# Patient Record
Sex: FEMALE | URBAN - METROPOLITAN AREA
[De-identification: names, ages, dates, MRNs, and addresses within clinical notes are randomized per-mention and may not be internally consistent; named-entity substitution may affect disease eponyms.]

---

## 2017-11-30 ENCOUNTER — APPOINTMENT (RX ONLY)
Dept: URBAN - METROPOLITAN AREA CLINIC 152 | Facility: CLINIC | Age: 72
Setting detail: DERMATOLOGY
End: 2017-11-30

## 2017-11-30 DIAGNOSIS — L30.4 ERYTHEMA INTERTRIGO: ICD-10-CM

## 2017-11-30 PROBLEM — J45.909 UNSPECIFIED ASTHMA, UNCOMPLICATED: Status: ACTIVE | Noted: 2017-11-30

## 2017-11-30 PROBLEM — F41.9 ANXIETY DISORDER, UNSPECIFIED: Status: ACTIVE | Noted: 2017-11-30

## 2017-11-30 PROBLEM — F32.9 MAJOR DEPRESSIVE DISORDER, SINGLE EPISODE, UNSPECIFIED: Status: ACTIVE | Noted: 2017-11-30

## 2017-11-30 PROBLEM — E03.9 HYPOTHYROIDISM, UNSPECIFIED: Status: ACTIVE | Noted: 2017-11-30

## 2017-11-30 PROCEDURE — ? COUNSELING

## 2017-11-30 PROCEDURE — ? PRESCRIPTION

## 2017-11-30 PROCEDURE — 99202 OFFICE O/P NEW SF 15 MIN: CPT

## 2017-11-30 RX ORDER — HYDROCORTISONE 25 MG/G
CREAM TOPICAL
Qty: 1 | Refills: 6 | Status: ERX | COMMUNITY
Start: 2017-11-30

## 2017-11-30 RX ORDER — MICONAZOLE NITRATE 20 MG/G
CREAM TOPICAL
Qty: 1 | Refills: 6 | Status: ERX | COMMUNITY
Start: 2017-11-30

## 2017-11-30 RX ADMIN — MICONAZOLE NITRATE: 20 CREAM TOPICAL at 17:01

## 2017-11-30 RX ADMIN — HYDROCORTISONE: 25 CREAM TOPICAL at 17:01

## 2017-11-30 ASSESSMENT — LOCATION DETAILED DESCRIPTION DERM
LOCATION DETAILED: LEFT MEDIAL BREAST 8-9:00 REGION
LOCATION DETAILED: LEFT ANTERIOR PROXIMAL THIGH
LOCATION DETAILED: RIGHT LATERAL ABDOMEN
LOCATION DETAILED: RIGHT ANTERIOR PROXIMAL THIGH
LOCATION DETAILED: LEFT LATERAL ABDOMEN
LOCATION DETAILED: PERIUMBILICAL SKIN

## 2017-11-30 ASSESSMENT — LOCATION SIMPLE DESCRIPTION DERM
LOCATION SIMPLE: RIGHT THIGH
LOCATION SIMPLE: ABDOMEN
LOCATION SIMPLE: LEFT BREAST
LOCATION SIMPLE: LEFT THIGH

## 2017-11-30 ASSESSMENT — LOCATION ZONE DERM
LOCATION ZONE: TRUNK
LOCATION ZONE: LEG

## 2017-11-30 ASSESSMENT — SEVERITY ASSESSMENT: SEVERITY: MODERATE TO SEVERE

## 2017-11-30 ASSESSMENT — PAIN INTENSITY VAS: HOW INTENSE IS YOUR PAIN 0 BEING NO PAIN, 10 BEING THE MOST SEVERE PAIN POSSIBLE?: 5/10 PAIN

## 2018-08-28 ENCOUNTER — OFFICE VISIT (OUTPATIENT)
Dept: INTERNAL MEDICINE CLINIC | Age: 73
End: 2018-08-28

## 2018-08-28 VITALS
HEIGHT: 67 IN | BODY MASS INDEX: 31.86 KG/M2 | OXYGEN SATURATION: 95 % | WEIGHT: 203 LBS | SYSTOLIC BLOOD PRESSURE: 132 MMHG | DIASTOLIC BLOOD PRESSURE: 86 MMHG | TEMPERATURE: 98.3 F | HEART RATE: 55 BPM | RESPIRATION RATE: 18 BRPM

## 2018-08-28 DIAGNOSIS — E03.9 ACQUIRED HYPOTHYROIDISM: Primary | ICD-10-CM

## 2018-08-28 DIAGNOSIS — F03.90 DEMENTIA WITHOUT BEHAVIORAL DISTURBANCE, UNSPECIFIED DEMENTIA TYPE: ICD-10-CM

## 2018-08-28 DIAGNOSIS — I10 ESSENTIAL HYPERTENSION: ICD-10-CM

## 2018-08-28 DIAGNOSIS — R73.9 ELEVATED BLOOD SUGAR: ICD-10-CM

## 2018-08-28 DIAGNOSIS — J41.0 SIMPLE CHRONIC BRONCHITIS (HCC): ICD-10-CM

## 2018-08-28 DIAGNOSIS — F32.A MILD DEPRESSION: ICD-10-CM

## 2018-08-28 LAB — HBA1C MFR BLD HPLC: 5.2 %

## 2018-08-28 RX ORDER — PHENOL/SODIUM PHENOLATE
20 AEROSOL, SPRAY (ML) MUCOUS MEMBRANE DAILY
COMMUNITY
End: 2018-11-28 | Stop reason: SDUPTHER

## 2018-08-28 RX ORDER — FUROSEMIDE 20 MG/1
TABLET ORAL DAILY
COMMUNITY
End: 2018-11-28 | Stop reason: SDUPTHER

## 2018-08-28 RX ORDER — CYANOCOBALAMIN 1000 UG/ML
1000 INJECTION, SOLUTION INTRAMUSCULAR; SUBCUTANEOUS ONCE
COMMUNITY
End: 2018-11-28 | Stop reason: SDUPTHER

## 2018-08-28 RX ORDER — SUCRALFATE 1 G/1
1 TABLET ORAL 4 TIMES DAILY
COMMUNITY
End: 2018-11-28 | Stop reason: SDUPTHER

## 2018-08-28 RX ORDER — THIOTHIXENE 2 MG/1
4 CAPSULE ORAL
COMMUNITY
End: 2019-04-18

## 2018-08-28 RX ORDER — SERTRALINE HYDROCHLORIDE 100 MG/1
TABLET, FILM COATED ORAL DAILY
COMMUNITY
End: 2019-04-18

## 2018-08-28 RX ORDER — QUETIAPINE FUMARATE 200 MG/1
100 TABLET, FILM COATED ORAL
COMMUNITY
End: 2019-04-18

## 2018-08-28 RX ORDER — DOXEPIN HYDROCHLORIDE 25 MG/1
CAPSULE ORAL
COMMUNITY
End: 2021-09-16 | Stop reason: ALTCHOICE

## 2018-08-28 RX ORDER — LAMOTRIGINE 200 MG/1
200 TABLET ORAL 2 TIMES DAILY
COMMUNITY

## 2018-08-28 RX ORDER — DONEPEZIL HYDROCHLORIDE 10 MG/1
10 TABLET, FILM COATED ORAL
COMMUNITY
End: 2020-06-23

## 2018-08-28 RX ORDER — PROPRANOLOL HYDROCHLORIDE 60 MG/1
10 TABLET ORAL 2 TIMES DAILY
COMMUNITY
End: 2020-01-23 | Stop reason: DRUGHIGH

## 2018-08-28 RX ORDER — LEVOTHYROXINE SODIUM 50 UG/1
TABLET ORAL
COMMUNITY
End: 2018-11-28 | Stop reason: SDUPTHER

## 2018-08-28 RX ORDER — HYDROXYZINE PAMOATE 25 MG/1
25 CAPSULE ORAL
COMMUNITY
End: 2022-07-11

## 2018-08-28 RX ORDER — POTASSIUM CHLORIDE 750 MG/1
10 CAPSULE, EXTENDED RELEASE ORAL DAILY
COMMUNITY
End: 2018-11-28 | Stop reason: SDUPTHER

## 2018-08-28 NOTE — PROGRESS NOTES
Chief Complaint   Patient presents with    Thyroid Problem     Hypothyroidism    Dementia     Taking B12 injections monthly. Neurologist in [facility] [address] recommend she continue injections forever. 1. Have you been to the ER, urgent care clinic since your last visit? Hospitalized since your last visit? No    2. Have you seen or consulted any other health care providers outside of the [address] [place] [address] since your last visit? Include any pap smears or colon screening. Will be seeing Psych on September 27th.     PHQ over the last two weeks 8/28/2018   PHQ Not Done Active Diagnosis of Depression or Bipolar Disorder

## 2018-09-07 NOTE — PROGRESS NOTES
HISTORY OF PRESENT ILLNESS  Amanda San is a 72 y.o. female. HPI   Pt is here to establish care. She recently moved here from [place] and was dx there with dementia. She has been doing well on lamictal, aricept, and latuda. She would like to continue these meds but does not currently need refills. She has copies of recent labs from April and her only abnormality was a slightly elevated BS. She is utd on her mammogram. She has no complaints today. Past Medical History:   Diagnosis Date    Asthma     Chronic obstructive pulmonary disease ([address]     Psychotic disorder     Thyroid disease        Past Surgical History:   Procedure Laterality Date    HX GI      Gastric Bypass X3, hemorrhoidectomy    HX GYN      Hysterectomy, , tubal ligation     HX HEENT      cataract 2015    HX ORTHOPAEDIC      Laminectomy  and , meniscus Left , TKR right , Left 2016       Social History     Social History    Marital status:      Spouse name: N/A    Number of children: N/A    Years of education: N/A     Occupational History    Not on file. Social History Main Topics    Smoking status: Never Smoker    Smokeless tobacco: Never Used    Alcohol use No    Drug use: No    Sexual activity: No     Other Topics Concern    Not on file     Social History Narrative    No narrative on file       No family history on file. Allergies   Allergen Reactions    Erythromycin Rash       Current Outpatient Prescriptions   Medication Sig    QUEtiapine (SEROQUEL) 200 mg tablet Take 200 mg by mouth two (2) times a day.  sertraline (ZOLOFT) 100 mg tablet Take  by mouth daily.  thiothixene (NAVANE) 2 mg capsule Take 4 mg by mouth nightly.  Omeprazole delayed release (PRILOSEC D/R) 20 mg tablet Take 20 mg by mouth daily.  furosemide (LASIX) 20 mg tablet Take  by mouth daily.  lamoTRIgine (LAMICTAL) 200 mg tablet Take 200 mg by mouth two (2) times a day.     potassium chloride SA (MICRO-K) 10 mEq capsule Take 10 mEq by mouth daily.  levothyroxine (SYNTHROID) 50 mcg tablet Take  by mouth Daily (before breakfast).  sucralfate (CARAFATE) 1 gram tablet Take 1 g by mouth four (4) times daily.  donepezil (ARICEPT) 10 mg tablet Take 10 mg by mouth nightly.  cyanocobalamin (VITAMIN B12) 1,000 mcg/mL injection 1,000 mcg by IntraMUSCular route once.  propranolol (INDERAL) 60 mg tablet Take 60 mg by mouth two (2) times a day.  lurasidone (LATUDA) 60 mg tab tablet Take  by mouth.  doxepin (SINEQUAN) 25 mg capsule Take  by mouth nightly.  hydrOXYzine pamoate (VISTARIL) 25 mg capsule Take 25 mg by mouth nightly. No current facility-administered medications for this visit. Review of Systems   Constitutional: Negative. Negative for chills, fever and malaise/fatigue. HENT: Negative. Negative for congestion, ear pain, sore throat and tinnitus. Eyes: Negative. Negative for blurred vision, double vision and photophobia. Respiratory: Positive for cough (chronic). Negative for shortness of breath and wheezing. Cardiovascular: Negative. Negative for chest pain, palpitations and leg swelling. Gastrointestinal: Negative. Negative for abdominal pain, heartburn, nausea and vomiting. Genitourinary: Negative. Negative for dysuria, frequency, hematuria and urgency. Musculoskeletal: Negative for back pain, joint pain, myalgias and neck pain. Ambulates w walker   Skin: Negative. Negative for itching and rash. Neurological: Negative. Negative for dizziness, tingling, tremors and headaches. Psychiatric/Behavioral: Positive for depression (controlled on meds) and memory loss (pt has dementia but is currently affecting only short term and not regularly). The patient is not nervous/anxious and does not have insomnia.       Visit Vitals    /86 (BP 1 Location: Left arm, BP Patient Position: Sitting)    Pulse (!) 55    Temp 98.3 °F (36.8 °C) (Oral)    Resp 18    Ht 5' 7\" (1.702 m)    Wt 203 lb (92.1 kg)    SpO2 95%    BMI 31.79 kg/m2       Physical Exam   Constitutional: She is oriented to person, place, and time. She appears well-developed and well-nourished. No distress. Pt is overweight   HENT:   Head: Normocephalic and atraumatic. Cardiovascular: Normal rate, regular rhythm and normal heart sounds. Pulmonary/Chest: Effort normal and breath sounds normal.   Neurological: She is alert and oriented to person, place, and time. Skin: Skin is warm and dry. She is not diaphoretic. Psychiatric: She has a normal mood and affect. Her behavior is normal.       ASSESSMENT and PLAN    ICD-10-CM ICD-9-CM    1. Acquired hypothyroidism E03.9 244.9    2. Dementia without behavioral disturbance, unspecified dementia type F03.90 294.20    3. Mild depression ([facility] [address] F32.0 311    4. Simple chronic bronchitis (HCC) J41.0 491.0    5. Essential hypertension I10 401.9    6. Elevated blood sugar R73.9 790.29 AMB POC HEMOGLOBIN A1C     Follow-up Disposition:  Return in about 3 months (around 11/28/2018) for follow up. Pt expressed understanding of visit summary and plans for any follow ups or referrals as well as any medications prescribed.

## 2018-09-07 NOTE — PATIENT INSTRUCTIONS
Helping A Person With Dementia: Care Instructions  Your Care Instructions    Dementia is a loss of mental skills that affects daily life. It is different from mild memory loss that occurs with aging. Dementia can cause problems with memory, thinking clearly, and planning. It is different for everyone. But it usually gets worse slowly. Some people who have dementia can function well for a long time. But at some point it may become hard for the person to care for himself or herself. It can be upsetting to learn that a loved one has this condition. You may be afraid and worried about what will happen. You may wonder how you will care for the person. There is no cure for dementia. But medicine may be able to slow memory loss and improve thinking for a while. Other medicines may help with sleep, depression, and behavior changes. Dementia is different for everyone. In some cases, people can function well for a long time. You can help your loved one by making his or her home life easier and safer. You also need to take care of yourself. Caregiving can be stressful. But support is available to help you and give you a break when you need it. The Alzheimer's Association offers good information and support. If you are caring for someone with dementia, you can help make life safer and more comfortable. You can also help your loved one make decisions about future care. You may also want to bring up legal and financial issues. These are hard but important conversations to have. Follow-up care is a key part of your loved one's treatment and safety. Be sure to make and go to all appointments, and call your doctor if your loved one is having problems. It's also a good idea to know your loved one's test results and keep a list of the medicines he or she takes. How can you care for your loved one at home? Taking care of the person  · If the person takes medicine for dementia, help him or her take it exactly as prescribed. Call the doctor if you notice any problems with the medicine. · Make a list of the person's medicines. Review it with all of his or her doctors. · Help the person eat a balanced diet. Serve plenty of whole grains, fruits, and vegetables every day. If the person is not hungry at mealtimes, give snacks at midmorning and in the afternoon. Offer drinks such as Boost, Ensure, or Sustacal if the person is losing weight. · Encourage exercise. Walking and other activities may slow the decline of mental ability. Help the person stay active mentally with reading, crossword puzzles, or other hobbies. · Take steps to help if the person is sundowning. This is the restless behavior and trouble with sleeping that may occur in late afternoon and at night. Try not to let the person nap during the day. Offer a glass of warm milk or caffeine-free tea before bedtime. · Develop a routine. Your loved one will feel less frustrated or confused with a clear, simple plan of what to do every day. · Be patient. A task may take the person longer than it used to. · For as long as he or she is able, allow your loved one to make decisions about activities, food, clothing, and other choices. Let him or her be independent, even if tasks take more time or are not done perfectly. Tailor tasks to the person's abilities. For example, if cooking is no longer safe, ask for other help. Your loved one can help set the table, or make simple dishes such as a salad. When the person needs help, offer it gently. Staying safe  · Make your home (or your loved one's home) safe. Tack down rugs, and put no-slip tape in the tub. Install handrails, and put safety switches on stoves and appliances. Keep rooms free of clutter. Make sure walkways around furniture are clear. Do not move furniture around, because the person may become confused. · Use locks on doors and cupboards.  Lock up knives, scissors, medicines, cleaning supplies, and other dangerous things. · Do not let the person drive or cook if he or she can't do it safely. A person with dementia should not drive unless he or she is able to pass an on-road driving test. Your state 's license bureau can do a driving test if there is any question. · Get medical alert jewelry for the person so that you can be contacted if he or she wanders away. If possible, provide a safe place for wandering, such as an enclosed yard or garden. Taking care of yourself  · Ask your doctor about support groups and other resources in your area. · Take care of your health. Be sure to eat healthy foods and get enough rest and exercise. · Take time for yourself. Respite services provide someone to stay with the person for a short time while you get out of the house for a few hours. · Make time for an activity that you enjoy. Read, listen to music, paint, do crafts, or play an instrument, even if it's only for a few minutes a day. · Spend time with family, friends, and others in your support system. When should you call for help? Call 911 anytime you think the person may need emergency care. For example, call if:    · The person who has dementia wanders away and you can't find him or her.     · The person who has dementia is seriously injured.    Call the doctor now or seek immediate medical care if:    · The person suddenly sees things that are not there (hallucinates).     · The person has a sudden change in his or her behavior.    Watch closely for changes in the person's health, and be sure to contact the doctor if:    · The person has symptoms that could cause injury.     · The person has problems with his or her medicine.     · You need more information to care for a person with dementia.     · You need respite care so you can take a break. Where can you learn more? Go to http://juan alberto-marina.info/.   Enter D712 in the search box to learn more about \"Helping A Person With Dementia: Care Instructions. \"  Current as of: December 7, 2017  Content Version: 11.7  © 8134-1913 [employer], [facility]. Care instructions adapted under license by [employer] (which disclaims liability or warranty for this information). If you have questions about a medical condition or this instruction, always ask your healthcare professional. [place] [address] any warranty or liability for your use of this information.

## 2018-11-28 ENCOUNTER — OFFICE VISIT (OUTPATIENT)
Dept: INTERNAL MEDICINE CLINIC | Age: 73
End: 2018-11-28

## 2018-11-28 VITALS
DIASTOLIC BLOOD PRESSURE: 82 MMHG | OXYGEN SATURATION: 98 % | TEMPERATURE: 98.3 F | RESPIRATION RATE: 18 BRPM | HEART RATE: 63 BPM | HEIGHT: 67 IN | BODY MASS INDEX: 31.79 KG/M2 | SYSTOLIC BLOOD PRESSURE: 155 MMHG

## 2018-11-28 DIAGNOSIS — R60.1 GENERALIZED EDEMA: ICD-10-CM

## 2018-11-28 DIAGNOSIS — K21.9 GASTROESOPHAGEAL REFLUX DISEASE WITHOUT ESOPHAGITIS: Primary | ICD-10-CM

## 2018-11-28 DIAGNOSIS — I10 ESSENTIAL HYPERTENSION: ICD-10-CM

## 2018-11-28 DIAGNOSIS — E03.9 ACQUIRED HYPOTHYROIDISM: ICD-10-CM

## 2018-11-28 DIAGNOSIS — S83.279S CMPLX TEAR OF LAT MENSC, CURRENT INJURY, UNSP KNEE, SEQUELA: ICD-10-CM

## 2018-11-28 DIAGNOSIS — K58.9 IRRITABLE BOWEL SYNDROME, UNSPECIFIED TYPE: ICD-10-CM

## 2018-11-28 DIAGNOSIS — E53.8 B12 DEFICIENCY: ICD-10-CM

## 2018-11-28 RX ORDER — POTASSIUM CHLORIDE 750 MG/1
10 CAPSULE, EXTENDED RELEASE ORAL DAILY
Qty: 90 CAP | Refills: 1 | Status: SHIPPED | OUTPATIENT
Start: 2018-11-28 | End: 2019-04-28

## 2018-11-28 RX ORDER — CYANOCOBALAMIN 1000 UG/ML
1000 INJECTION, SOLUTION INTRAMUSCULAR; SUBCUTANEOUS ONCE
Qty: 1 VIAL | Refills: 0
Start: 2018-11-28 | End: 2018-11-28

## 2018-11-28 RX ORDER — PHENOL/SODIUM PHENOLATE
20 AEROSOL, SPRAY (ML) MUCOUS MEMBRANE DAILY
Qty: 90 TAB | Refills: 1 | Status: SHIPPED | OUTPATIENT
Start: 2018-11-28 | End: 2019-07-17 | Stop reason: SDUPTHER

## 2018-11-28 RX ORDER — FUROSEMIDE 20 MG/1
20 TABLET ORAL DAILY
Qty: 90 TAB | Refills: 1 | Status: SHIPPED | OUTPATIENT
Start: 2018-11-28 | End: 2019-07-17 | Stop reason: SDUPTHER

## 2018-11-28 RX ORDER — SUCRALFATE 1 G/1
1 TABLET ORAL 4 TIMES DAILY
Qty: 360 TAB | Refills: 1 | Status: SHIPPED | OUTPATIENT
Start: 2018-11-28 | End: 2019-03-06 | Stop reason: SDUPTHER

## 2018-11-28 RX ORDER — LEVOTHYROXINE SODIUM 50 UG/1
50 TABLET ORAL
Qty: 90 TAB | Refills: 1 | Status: SHIPPED | OUTPATIENT
Start: 2018-11-28 | End: 2019-05-23 | Stop reason: SDUPTHER

## 2018-11-29 LAB
ALBUMIN SERPL-MCNC: 4 G/DL (ref 3.5–4.8)
ALBUMIN/GLOB SERPL: 1.4 {RATIO} (ref 1.2–2.2)
ALP SERPL-CCNC: 136 IU/L (ref 39–117)
ALT SERPL-CCNC: 10 IU/L (ref 0–32)
AST SERPL-CCNC: 15 IU/L (ref 0–40)
BILIRUB SERPL-MCNC: 0.3 MG/DL (ref 0–1.2)
BUN SERPL-MCNC: 17 MG/DL (ref 8–27)
BUN/CREAT SERPL: 17 (ref 12–28)
CALCIUM SERPL-MCNC: 9.3 MG/DL (ref 8.7–10.3)
CHLORIDE SERPL-SCNC: 103 MMOL/L (ref 96–106)
CHOLEST SERPL-MCNC: 275 MG/DL (ref 100–199)
CO2 SERPL-SCNC: 24 MMOL/L (ref 20–29)
CREAT SERPL-MCNC: 1 MG/DL (ref 0.57–1)
GLOBULIN SER CALC-MCNC: 2.8 G/DL (ref 1.5–4.5)
GLUCOSE SERPL-MCNC: 86 MG/DL (ref 65–99)
HDLC SERPL-MCNC: 74 MG/DL
LDLC SERPL CALC-MCNC: 177 MG/DL (ref 0–99)
POTASSIUM SERPL-SCNC: 4.4 MMOL/L (ref 3.5–5.2)
PROT SERPL-MCNC: 6.8 G/DL (ref 6–8.5)
SODIUM SERPL-SCNC: 141 MMOL/L (ref 134–144)
TRIGL SERPL-MCNC: 121 MG/DL (ref 0–149)
VLDLC SERPL CALC-MCNC: 24 MG/DL (ref 5–40)

## 2018-12-11 ENCOUNTER — TELEPHONE (OUTPATIENT)
Dept: INTERNAL MEDICINE CLINIC | Age: 73
End: 2018-12-11

## 2018-12-11 DIAGNOSIS — E78.00 HYPERCHOLESTEROLEMIA: Primary | ICD-10-CM

## 2018-12-11 NOTE — TELEPHONE ENCOUNTER
Please advise pt her LDL cholesterol was very high, 177. Can you verify if she was fasting? If not I would like to repeat fasting labs and if she was then I will send in meds and we can recheck in 3 months.

## 2018-12-11 NOTE — PROGRESS NOTES
HISTORY OF PRESENT ILLNESS  Yamil Herrera is a 72 y.o. female. HPI   Pt is here for med refills. She is out of all her regular meds and denies any change in her conditions. She is fasting for labs and has no additional complaints today. Allergies   Allergen Reactions    Erythromycin Rash       Current Outpatient Medications   Medication Sig    Omeprazole delayed release (PRILOSEC D/R) 20 mg tablet Take 1 Tab by mouth daily.  furosemide (LASIX) 20 mg tablet Take 1 Tab by mouth daily.  potassium chloride SA (MICRO-K) 10 mEq capsule Take 1 Cap by mouth daily.  levothyroxine (SYNTHROID) 50 mcg tablet Take 1 Tab by mouth Daily (before breakfast).  sucralfate (CARAFATE) 1 gram tablet Take 1 Tab by mouth four (4) times daily.  QUEtiapine (SEROQUEL) 200 mg tablet Take 200 mg by mouth two (2) times a day.  sertraline (ZOLOFT) 100 mg tablet Take  by mouth daily.  thiothixene (NAVANE) 2 mg capsule Take 4 mg by mouth nightly.  lamoTRIgine (LAMICTAL) 200 mg tablet Take 200 mg by mouth two (2) times a day.  donepezil (ARICEPT) 10 mg tablet Take 10 mg by mouth nightly.  propranolol (INDERAL) 60 mg tablet Take 60 mg by mouth two (2) times a day.  lurasidone (LATUDA) 60 mg tab tablet Take  by mouth.  doxepin (SINEQUAN) 25 mg capsule Take  by mouth nightly.  hydrOXYzine pamoate (VISTARIL) 25 mg capsule Take 25 mg by mouth nightly. No current facility-administered medications for this visit. Review of Systems   Constitutional: Negative. Negative for chills, fever and malaise/fatigue. HENT: Negative. Negative for congestion, ear pain, sore throat and tinnitus. Eyes: Negative. Negative for blurred vision, double vision and photophobia. Respiratory: Positive for cough (chronic). Negative for shortness of breath and wheezing. Cardiovascular: Negative. Negative for chest pain, palpitations and leg swelling. Gastrointestinal: Negative.   Negative for abdominal pain, heartburn, nausea and vomiting. Genitourinary: Negative. Negative for dysuria, frequency, hematuria and urgency. Musculoskeletal: Negative for back pain, joint pain, myalgias and neck pain. Ambulates w walker   Skin: Negative. Negative for itching and rash. Neurological: Negative. Negative for dizziness, tingling, tremors and headaches. Psychiatric/Behavioral: Positive for depression (controlled on meds) and memory loss (pt has dementia but is currently affecting only short term and not regularly). The patient is not nervous/anxious and does not have insomnia. Visit Vitals  /82   Pulse 63   Temp 98.3 °F (36.8 °C) (Oral)   Resp 18   Ht 5' 7\" (1.702 m)   SpO2 98%   BMI 31.79 kg/m²       Physical Exam   Constitutional: She is oriented to person, place, and time. She appears well-developed and well-nourished. No distress. Pt is overweight   HENT:   Head: Normocephalic and atraumatic. Cardiovascular: Normal rate, regular rhythm and normal heart sounds. Pulmonary/Chest: Effort normal and breath sounds normal.   Neurological: She is alert and oriented to person, place, and time. Skin: Skin is warm and dry. She is not diaphoretic. Psychiatric: She has a normal mood and affect. Her behavior is normal.       ASSESSMENT and PLAN    ICD-10-CM ICD-9-CM    1. Gastroesophageal reflux disease without esophagitis K21.9 530.81 Omeprazole delayed release (PRILOSEC D/R) 20 mg tablet   2. Acquired hypothyroidism E03.9 244.9 levothyroxine (SYNTHROID) 50 mcg tablet   3. Irritable bowel syndrome, unspecified type K58.9 564.1 sucralfate (CARAFATE) 1 gram tablet   4. B12 deficiency E53.8 266.2 cyanocobalamin (VITAMIN B12) 1,000 mcg/mL injection   5. Essential hypertension I10 401.9 LIPID PANEL      METABOLIC PANEL, COMPREHENSIVE      COLLECTION VENOUS BLOOD,VENIPUNCTURE   6. Generalized edema R60.1 782.3 furosemide (LASIX) 20 mg tablet      potassium chloride SA (MICRO-K) 10 mEq capsule   7.  Cmplx tear of lat mensc, current injury, unsp knee, sequela S83.279S 905.7      Follow-up Disposition:  Return if symptoms worsen or fail to improve. Pt expressed understanding of visit summary and plans for any follow ups or referrals as well as any medications prescribed.

## 2018-12-11 NOTE — PATIENT INSTRUCTIONS

## 2018-12-13 NOTE — TELEPHONE ENCOUNTER
She was fasting the day of her labwork. She is ok to start statin, please send to [facility] [address].

## 2018-12-17 RX ORDER — ROSUVASTATIN CALCIUM 20 MG/1
20 TABLET, COATED ORAL
Qty: 90 TAB | Refills: 0 | Status: SHIPPED | OUTPATIENT
Start: 2018-12-17 | End: 2019-04-18 | Stop reason: DRUGHIGH

## 2019-03-04 ENCOUNTER — TELEPHONE (OUTPATIENT)
Dept: INTERNAL MEDICINE CLINIC | Age: 74
End: 2019-03-04

## 2019-03-04 DIAGNOSIS — K58.9 IRRITABLE BOWEL SYNDROME, UNSPECIFIED TYPE: ICD-10-CM

## 2019-03-04 DIAGNOSIS — R79.89 OTHER SPECIFIED ABNORMAL FINDINGS OF BLOOD CHEMISTRY: ICD-10-CM

## 2019-03-04 NOTE — TELEPHONE ENCOUNTER
Pt called in requesting to have a lab order to [facility] mailed to her house. She is requesting a lipid panel and CMP.      Patient called requesting refill on Carafate 1mg   Patient last appointment was 11/28/18  Patient next appointment is 05/28/19  Pharmacy patient wants refill to go to  [facility]

## 2019-03-06 RX ORDER — SUCRALFATE 1 G/1
1 TABLET ORAL 4 TIMES DAILY
Qty: 360 TAB | Refills: 1 | Status: SHIPPED | OUTPATIENT
Start: 2019-03-06 | End: 2019-07-17 | Stop reason: SDUPTHER

## 2019-04-18 ENCOUNTER — OFFICE VISIT (OUTPATIENT)
Dept: INTERNAL MEDICINE CLINIC | Age: 74
End: 2019-04-18

## 2019-04-18 VITALS
OXYGEN SATURATION: 95 % | HEIGHT: 67 IN | SYSTOLIC BLOOD PRESSURE: 144 MMHG | DIASTOLIC BLOOD PRESSURE: 83 MMHG | TEMPERATURE: 98.3 F | HEART RATE: 80 BPM | WEIGHT: 203 LBS | RESPIRATION RATE: 18 BRPM | BODY MASS INDEX: 31.86 KG/M2

## 2019-04-18 DIAGNOSIS — B37.9 CANDIDA GLABRATA INFECTION: ICD-10-CM

## 2019-04-18 DIAGNOSIS — K58.9 IRRITABLE BOWEL SYNDROME, UNSPECIFIED TYPE: ICD-10-CM

## 2019-04-18 DIAGNOSIS — I10 ESSENTIAL HYPERTENSION: ICD-10-CM

## 2019-04-18 DIAGNOSIS — N39.0 URINARY TRACT INFECTION WITHOUT HEMATURIA, SITE UNSPECIFIED: Primary | ICD-10-CM

## 2019-04-18 DIAGNOSIS — E78.00 HYPERCHOLESTEROLEMIA: ICD-10-CM

## 2019-04-18 LAB
BILIRUB UR QL STRIP: NEGATIVE
GLUCOSE UR-MCNC: NEGATIVE MG/DL
KETONES P FAST UR STRIP-MCNC: NEGATIVE MG/DL
PH UR STRIP: 7 [PH] (ref 4.6–8)
PROT UR QL STRIP: NEGATIVE
SP GR UR STRIP: 1.01 (ref 1–1.03)
UA UROBILINOGEN AMB POC: NORMAL (ref 0.2–1)
URINALYSIS CLARITY POC: CLEAR
URINALYSIS COLOR POC: YELLOW
URINE BLOOD POC: NORMAL
URINE LEUKOCYTES POC: NEGATIVE
URINE NITRITES POC: NEGATIVE

## 2019-04-18 RX ORDER — ROSUVASTATIN CALCIUM 40 MG/1
40 TABLET, COATED ORAL
Qty: 90 TAB | Refills: 1 | Status: SHIPPED | OUTPATIENT
Start: 2019-04-18 | End: 2019-07-17 | Stop reason: SDUPTHER

## 2019-04-18 RX ORDER — AMOXICILLIN AND CLAVULANATE POTASSIUM 875; 125 MG/1; MG/1
TABLET, FILM COATED ORAL
Refills: 0 | COMMUNITY
Start: 2019-04-10 | End: 2019-07-17 | Stop reason: ALTCHOICE

## 2019-04-18 RX ORDER — FLUCONAZOLE 150 MG/1
150 TABLET ORAL DAILY
Qty: 7 TAB | Refills: 0 | Status: SHIPPED | OUTPATIENT
Start: 2019-04-18 | End: 2019-04-25

## 2019-04-18 RX ORDER — LANOLIN ALCOHOL/MO/W.PET/CERES
250 CREAM (GRAM) TOPICAL 3 TIMES DAILY
COMMUNITY

## 2019-04-18 RX ORDER — GUAIFENESIN 100 MG/5ML
200 SOLUTION ORAL
COMMUNITY
Start: 2019-04-10 | End: 2020-01-27

## 2019-04-18 NOTE — PROGRESS NOTES
Chief Complaint   Patient presents with   [facility] Follow Up     Pneumonia     1. Have you been to the ER, urgent care clinic since your last visit? Hospitalized since your last visit? Yes Where: [facility] 4/7-4/11    2. Have you seen or consulted any other health care providers outside of the [address] [place] [address] since your last visit? Include any pap smears or colon screening.  No

## 2019-04-28 NOTE — PROGRESS NOTES
HISTORY OF PRESENT ILLNESS  Little Garrison is a 69 y.o. female. HPI   Pt is here for pneumonia follow up from hospital. Pt is feeling much better and has no complaints. Hospital notes did show UTI and candida glabratta in urine. Pt was not treated for candida so will treat that mini given all abx she has been on. She needs refills on crestor. Also advised to f/u with GI for her IBS. Allergies   Allergen Reactions    Erythromycin Rash       Current Outpatient Medications   Medication Sig    valbenazine (INGREZZA) 80 mg cap Take  by mouth.  guaiFENesin (ROBITUSSIN) 100 mg/5 mL liquid Take 200 mg by mouth.  magnesium oxide (MAG-OX) 400 mg tablet Take 400 mg by mouth.  rosuvastatin (CRESTOR) 40 mg tablet Take 1 Tab by mouth nightly.  sucralfate (CARAFATE) 1 gram tablet Take 1 Tab by mouth four (4) times daily.  Omeprazole delayed release (PRILOSEC D/R) 20 mg tablet Take 1 Tab by mouth daily.  furosemide (LASIX) 20 mg tablet Take 1 Tab by mouth daily.  levothyroxine (SYNTHROID) 50 mcg tablet Take 1 Tab by mouth Daily (before breakfast).  lamoTRIgine (LAMICTAL) 200 mg tablet Take 200 mg by mouth two (2) times a day.  donepezil (ARICEPT) 10 mg tablet Take 10 mg by mouth nightly.  propranolol (INDERAL) 60 mg tablet Take 10 mg by mouth two (2) times a day.  lurasidone (LATUDA) 60 mg tab tablet Take 80 mg by mouth.  doxepin (SINEQUAN) 25 mg capsule Take  by mouth nightly.  hydrOXYzine pamoate (VISTARIL) 25 mg capsule Take 25 mg by mouth nightly.  amoxicillin-clavulanate (AUGMENTIN) 875-125 mg per tablet      No current facility-administered medications for this visit. Review of Systems   Constitutional: Negative. Negative for chills, fever and malaise/fatigue. HENT: Negative. Negative for congestion, ear pain, sore throat and tinnitus. Eyes: Negative. Negative for blurred vision, double vision and photophobia. Respiratory: Negative.   Negative for cough, shortness of breath and wheezing. Cardiovascular: Negative. Negative for chest pain, palpitations and leg swelling. Gastrointestinal: Negative. Negative for abdominal pain, heartburn, nausea and vomiting. Genitourinary: Negative. Negative for dysuria, frequency, hematuria and urgency. Musculoskeletal: Negative for back pain, joint pain, myalgias and neck pain. Ambulates w walker   Skin: Negative. Negative for itching and rash. Neurological: Negative. Negative for dizziness, tingling, tremors and headaches. Psychiatric/Behavioral: Positive for depression (controlled on meds) and memory loss (pt has dementia but is currently affecting only short term and not regularly). The patient is not nervous/anxious and does not have insomnia. Visit Vitals  /83   Pulse 80   Temp 98.3 °F (36.8 °C) (Oral)   Resp 18   Ht 5' 7\" (1.702 m)   Wt 203 lb (92.1 kg)   SpO2 95%   BMI 31.79 kg/m²       Physical Exam   Constitutional: She is oriented to person, place, and time. She appears well-developed and well-nourished. No distress. Pt is overweight   HENT:   Head: Normocephalic and atraumatic. Cardiovascular: Normal rate, regular rhythm and normal heart sounds. Exam reveals no gallop and no friction rub. No murmur heard. Pulmonary/Chest: Effort normal and breath sounds normal. No respiratory distress. She has no wheezes. She has no rales. Abdominal: Soft. There is no tenderness. Neurological: She is alert and oriented to person, place, and time. Skin: Skin is warm and dry. She is not diaphoretic. Psychiatric: She has a normal mood and affect. Her behavior is normal.       ASSESSMENT and PLAN    ICD-10-CM ICD-9-CM    1. Urinary tract infection without hematuria, site unspecified N39.0 599.0 AMB POC URINALYSIS DIP STICK AUTO W/O MICRO   2. Hypercholesterolemia E78.00 272.0 rosuvastatin (CRESTOR) 40 mg tablet   3. Essential hypertension I10 401.9    4.  Irritable bowel syndrome, unspecified type K58.9 564.1    5. Candida glabrata infection B37.9 112.9 fluconazole (DIFLUCAN) 150 mg tablet     Follow-up and Dispositions    · Return if symptoms worsen or fail to improve.

## 2019-04-28 NOTE — PATIENT INSTRUCTIONS
Pneumonia: Care Instructions  Your Care Instructions    Pneumonia is an infection of the lungs. Most cases are caused by infections from bacteria or viruses. Pneumonia may be mild or very severe. If it is caused by bacteria, you will be treated with antibiotics. It may take a few weeks to a few months to recover fully from pneumonia, depending on how sick you were and whether your overall health is good. Follow-up care is a key part of your treatment and safety. Be sure to make and go to all appointments, and call your doctor if you are having problems. It's also a good idea to know your test results and keep a list of the medicines you take. How can you care for yourself at home? · Take your antibiotics exactly as directed. Do not stop taking the medicine just because you are feeling better. You need to take the full course of antibiotics. · Take your medicines exactly as prescribed. Call your doctor if you think you are having a problem with your medicine. · Get plenty of rest and sleep. You may feel weak and tired for a while, but your energy level will improve with time. · To prevent dehydration, drink plenty of fluids, enough so that your urine is light yellow or clear like water. Choose water and other caffeine-free clear liquids until you feel better. If you have kidney, heart, or liver disease and have to limit fluids, talk with your doctor before you increase the amount of fluids you drink. · Take care of your cough so you can rest. A cough that brings up mucus from your lungs is common with pneumonia. It is one way your body gets rid of the infection. But if coughing keeps you from resting or causes severe fatigue and chest-wall pain, talk to your doctor. He or she may suggest that you take a medicine to reduce the cough. · Use a vaporizer or humidifier to add moisture to your bedroom. Follow the directions for cleaning the machine. · Do not smoke or allow others to smoke around you.  Smoke will make your cough last longer. If you need help quitting, talk to your doctor about stop-smoking programs and medicines. These can increase your chances of quitting for good. · Take an over-the-counter pain medicine, such as acetaminophen (Tylenol), ibuprofen (Advil, Motrin), or naproxen (Aleve). Read and follow all instructions on the label. · Do not take two or more pain medicines at the same time unless the doctor told you to. Many pain medicines have acetaminophen, which is Tylenol. Too much acetaminophen (Tylenol) can be harmful. · If you were given a spirometer to measure how well your lungs are working, use it as instructed. This can help your doctor tell how your recovery is going. · To prevent pneumonia in the future, talk to your doctor about getting a flu vaccine (once a year) and a pneumococcal vaccine (one time only for most people). When should you call for help? Call 911 anytime you think you may need emergency care. For example, call if:    · You have severe trouble breathing.    Call your doctor now or seek immediate medical care if:    · You cough up dark brown or bloody mucus (sputum).     · You have new or worse trouble breathing.     · You are dizzy or lightheaded, or you feel like you may faint.    Watch closely for changes in your health, and be sure to contact your doctor if:    · You have a new or higher fever.     · You are coughing more deeply or more often.     · You are not getting better after 2 days (48 hours).     · You do not get better as expected. Where can you learn more? Go to http://juan alberto-marina.info/. Enter [id number] in the search box to learn more about \"Pneumonia: Care Instructions. \"  Current as of: September 5, 2018  Content Version: 11.9  © 8612-7191 [employer]. Care instructions adapted under license by [employer] (which disclaims liability or warranty for this information).  If you have questions about a medical condition or this instruction, always ask your healthcare professional. [place] [address] any warranty or liability for your use of this information.

## 2019-05-16 ENCOUNTER — TELEPHONE (OUTPATIENT)
Dept: FAMILY MEDICINE CLINIC | Age: 74
End: 2019-05-16

## 2019-06-26 ENCOUNTER — HOSPITAL ENCOUNTER (OUTPATIENT)
Dept: PHYSICAL THERAPY | Age: 74
Discharge: HOME | End: 2019-06-26
Payer: MEDICARE

## 2019-06-26 PROCEDURE — 97530 THERAPEUTIC ACTIVITIES: CPT | Performed by: PHYSICAL THERAPIST

## 2019-06-26 PROCEDURE — 97162 PT EVAL MOD COMPLEX 30 MIN: CPT | Performed by: PHYSICAL THERAPIST

## 2019-06-26 NOTE — PROGRESS NOTES
[facility] [address] [place] [address]  [address], [address] ,[address], [address]  Phone: [phone]  Fax: [phone] / [address] [place] [address] PHYSICAL THERAPY SERVICES  Patient Name: Kathy Cardoso : 1945   Medical   Diagnosis: Other abnormalities of gait and mobility [R26.89] Treatment Diagnosis: Imbalance   Onset Date: May 2019     Referral Source: Cristiana Silva, * Start of Care [facility]): 2019   Prior Hospitalization: See medical history Provider #: [id number]   Prior Level of Function: Pt lived alone until one year ago. She now lives with family due to multiple medical issues and h/o falls. Comorbidities: Peripheral neuropathy, tremors, dementia, depression, B knee OA, thyroid dysfunction, h/o lumbar fusion with residual back pain, h/o B TKA   Medications: Verified on Patient Summary List   The Plan of Care and following information is based on the information from the initial evaluation.   ===========================================================================================  Assessment / key information:  70 y/o female presents to PT with her daughter. She walks without AD, but has some difficulty with getting up from a chair. She notes falling one week ago when she reaches outside of her YVONNE, and then loses balance. Pt/dtr note she has a left lateral lean after prolonged standing or walking. Back is reported at 4/10, which is a common symptom for her - especially with prolonged walking. Resting tremors are seen in hands and legs at times. Examination reveals strength deficits in L LE: knee extension, hip ABD, and ankle DF are all 3+/5. CTSIB results showed increased sway, but she could maintain balance for 30s with all condition except on foam with eyes closed (4s). When transferring, pt tends to lean back with trunk when attempting to rise, and has tendency to fall back toward the chair.   Pt/dtr were given educated on appropriate technique to shift weight forward over tibias to allow legs to produce upward push. Pt was able to rise form chair without using UEs when given VCs.  ===========================================================================================  Eval Complexity: History HIGH Complexity :3+ comorbidities / personal factors will impact the outcome/ POC ;  Examination  MEDIUM Complexity : 3 Standardized tests and measures addressing body structure, function, activity limitation and / or participation in recreation ; Presentation MEDIUM Complexity : Evolving with changing characteristics ; Decision Making MEDIUM Complexity : FOTO score of 26-74; Overall Complexity MEDIUM  Problem List: pain affecting function, decrease ROM, decrease strength, impaired gait/ balance, decrease ADL/ functional abilitiies, decrease activity tolerance, decrease flexibility/ joint mobility and decrease transfer abilities   Treatment Plan may include any combination of the following: Therapeutic exercise, Therapeutic activities, Neuromuscular re-education, Physical agent/modality, Gait/balance training, Manual therapy, Dry Needling, Aquatic therapy, Patient education, Self Care training, Functional mobility training, Home safety training and Stair training  Patient / Family readiness to learn indicated by: asking questions, trying to perform skills and interest  Persons(s) to be included in education: patient (P) and family support person (FSP);list daughter  Barriers to Learning/Limitations: None  Measures taken:    Patient Goal (s): \"Improve balance and walking\"   Patient self reported health status: fair  Rehabilitation Potential: good   Short Term Goals: To be accomplished in  2  weeks:  1. Pt/dtr to report ability of pt to rise from various chairs on first trial using newly learned technique. 2. Pt independent with HEP for LE strengthening/endurance and basic balance exercises.  Long Term Goals:  To be accomplished in  6  weeks:  1. Pt to increase FOTO score from 39 to >/= 48.  2. Pt independent with HEP for high level LE strengthening/ednruance and balance exercises. 3. Pt to reports no falls for 6 weeks. 4. Pt able to sit<->stand form chair 5 times without using UEs to show improved safety with transfers. .  Frequency / Duration:   Patient to be seen  2  times per week for 6  weeks:  Patient / Caregiver education and instruction: self care, activity modification and exercises    Therapist Signature: Sam Salmeron PT Date: 9/79/9277   Certification Period: 6/26/19 - 9/26/19 Time: 10:55 AM   ===========================================================================================  I certify that the above Physical Therapy Services are being furnished while the patient is under my care. I agree with the treatment plan and certify that this therapy is necessary. Physician Signature:        Date:       Time:     Please sign and return to [facility] or you may fax the signed copy to [phone]. Thank you.

## 2019-06-26 NOTE — PROGRESS NOTES
PHYSICAL THERAPY - DAILY TREATMENT NOTE    Patient Name: Olive Sosa        Date: 2019  : 1945   YES Patient  Verified  Visit #:     Insurance: Payor: Judy Mcclure / Plan: [facility] [employer] / Product Type: Managed Care Medicare /      In time: 11:00 Out time: 11:50   Total Treatment Time: 50     BCBS/Medicare Time Tracking (below)   Total Timed Codes (min):  10 1:1 Treatment Time:  10     TREATMENT AREA =  Other abnormalities of gait and mobility [R26.89]    SUBJECTIVE  Pain Level (on 0 to 10 scale):  4  / 10   Medication Changes/New allergies or changes in medical history, any new surgeries or procedures?     NO    If yes, update Summary List   Subjective Functional Status/Changes:  []  No changes reported     See eval/POC           Modalities Rationale:   NA   min [] Estim, type/location:                                      []  att     []  unatt     []  w/US     []  w/ice    []  w/heat    min []  Mechanical Traction: type/lbs                   []  pro   []  sup   []  int   []  cont    []  before manual    []  after manual    min []  Ultrasound, settings/location:      min []  Iontophoresis w/ dexamethasone, location:                                               []  take home patch       []  in clinic    min []  Ice     []  Heat    location/position:     min []  Vasopneumatic Device, press/temp:     min []  Other:    [] Skin assessment post-treatment (if applicable):    []  intact    []  redness- no adverse reaction     []redness - adverse reaction:        - min Therapeutic Exercise:  []  See flow sheet   Rationale:          10 min Therapeutic Activity: [x]  See flow sheet   Rationale:    increase ROM and improve coordination to improve the patients ability to perform safe sit<->stand transfers      Billed With/As:   [] TE   [] TA   [] Neuro   [] Self Care Patient Education: [x] Review HEP    [] Progressed/Changed HEP based on:   [] positioning   [] body mechanics   [] transfers   [] heat/ice application    [] other:      Other Objective/Functional Measures:    FOTO - 39     Post Treatment Pain Level (on 0 to 10) scale:   4 / 10     ASSESSMENT  Assessment/Changes in Function:     Signs and symptoms suggest balance deficits related to underlying peripheral neuropathy     []  See Progress Note/Recertification   Patient will continue to benefit from skilled PT services to modify and progress therapeutic interventions, address functional mobility deficits, address ROM deficits, address strength deficits, analyze and address soft tissue restrictions, analyze and cue movement patterns, analyze and modify body mechanics/ergonomics, assess and modify postural abnormalities and address imbalance/dizziness to attain remaining goals.    Progress toward goals / Updated goals:    Goals established today     PLAN  [x]  Upgrade activities as tolerated YES Continue plan of care   []  Discharge due to :    []  Other:      Therapist: Miracle Cash PT    Date: 6/26/2019 Time: 10:56 AM     Future Appointments   Date Time Provider Gui Woodward   6/26/2019 11:00 AM Garrick Hager PT [facility]   7/15/2019 10:00 AM King Bruno PA-C [address] [place] [address]

## 2019-07-01 ENCOUNTER — HOSPITAL ENCOUNTER (OUTPATIENT)
Dept: PHYSICAL THERAPY | Age: 74
Discharge: HOME | End: 2019-07-01
Payer: MEDICARE

## 2019-07-01 PROCEDURE — 97110 THERAPEUTIC EXERCISES: CPT

## 2019-07-01 NOTE — PROGRESS NOTES
PHYSICAL THERAPY - DAILY TREATMENT NOTE    Patient Name: Rae Moran        Date: 2019  : 1945   YES Patient  Verified  Visit #:     Insurance: Payor: Ashley Marquis / Plan: [address] [place] [address] HMO / Product Type: Managed Care Medicare /      In time: 235 Out time: 325   Total Treatment Time: 50     BCBS/Medicare Time Tracking (below)   Total Timed Codes (min):  50 1:1 Treatment Time:  40     TREATMENT AREA =  Other abnormalities of gait and mobility [R26.89]    SUBJECTIVE  Pain Level (on 0 to 10 scale):  4  / 10   Medication Changes/New allergies or changes in medical history, any new surgeries or procedures?     NO    If yes, update Summary List   Subjective Functional Status/Changes:  []  No changes reported     I have trouble getting in and out of bed           Modalities Rationale:     decrease inflammation, decrease pain and increase tissue extensibility to improve patient's ability to perform ADLs   min [] Estim, type/location:                                      []  att     []  unatt     []  w/US     []  w/ice    []  w/heat    min []  Mechanical Traction: type/lbs                   []  pro   []  sup   []  int   []  cont    []  before manual    []  after manual    min []  Ultrasound, settings/location:      min []  Iontophoresis w/ dexamethasone, location:                                               []  take home patch       []  in clinic    min []  Ice     []  Heat    location/position:     min []  Vasopneumatic Device, press/temp:     min []  Other:    [x] Skin assessment post-treatment (if applicable):    [x]  intact    [x]  redness- no adverse reaction     []redness - adverse reaction:        50/40 min Therapeutic Exercise:  [x]  See flow sheet   Rationale:      increase ROM and increase strength to improve the patients ability to perform transfers, bed mobility and prolonged walking     Billed With/As:   [x] TE   [] TA   [] Neuro   [] Self Care Patient Education: [x] Review HEP    [] Progressed/Changed HEP based on:   [] positioning   [] body mechanics   [] transfers   [] heat/ice application    [x] other: Issued written HEP and reviewed     Other Objective/Functional Measures:    TE per FS     Post Treatment Pain Level (on 0 to 10) scale:   4  / 10     ASSESSMENT  Assessment/Changes in Function:     Required supervision during walking exercises and CGA during static balance exercises. Able to self correct LOB     []  See Progress Note/Recertification   Patient will continue to benefit from skilled PT services to modify and progress therapeutic interventions, address functional mobility deficits, address ROM deficits, address strength deficits, analyze and address soft tissue restrictions, analyze and cue movement patterns, analyze and modify body mechanics/ergonomics, assess and modify postural abnormalities, address imbalance/dizziness and instruct in home and community integration to attain remaining goals.    Progress toward goals / Updated goals:    Progressing towards STG2     PLAN  [x]  Upgrade activities as tolerated YES Continue plan of care   []  Discharge due to :    []  Other:      Therapist: Aylin No, PT, DPT, MTC, CMTPT    Date: 7/1/2019 Time: 7:21 PM     Future Appointments   Date Time Provider Gui Woodward   7/3/2019  1:30 PM Arslan Escalera, PT [facility]   7/8/2019  2:00 PM Arslan Escalera, PT [facility]   7/11/2019  1:00 PM Arslan Escalera, PT [facility]   7/15/2019 10:00 AM Nay Bruno PA-C [address]   7/16/2019 12:00 PM Xenia West PT [facility]   7/19/2019 12:30 PM Xenia West PT [facility]   7/23/2019 11:30 AM Xenia West PT [facility]   7/26/2019 11:30 AM Xenia West PT [facility]   7/29/2019 12:00 PM Arslan Escalera, PT [facility]   7/31/2019 12:00 PM Maude Dunbar, PT [facility]

## 2019-07-03 ENCOUNTER — HOSPITAL ENCOUNTER (OUTPATIENT)
Dept: PHYSICAL THERAPY | Age: 74
Discharge: HOME | End: 2019-07-03
Payer: MEDICARE

## 2019-07-03 PROCEDURE — 97110 THERAPEUTIC EXERCISES: CPT | Performed by: PHYSICAL THERAPIST

## 2019-07-03 NOTE — PROGRESS NOTES
PHYSICAL THERAPY - DAILY TREATMENT NOTE    Patient Name: Renetta Mead        Date: 7/3/2019  : 1945   YES Patient  Verified  Visit #:   3   of   12  Insurance: Payor: Salty Fregoso / Plan: [address] [place] [address] HMO / Product Type: Managed Care Medicare /      In time: 1:30 Out time: 2:15   Total Treatment Time: 45     BCBS/Medicare Time Tracking (below)   Total Timed Codes (min):  25 1:1 Treatment Time:  25     TREATMENT AREA =  Other abnormalities of gait and mobility [R26.89]    SUBJECTIVE  Pain Level (on 0 to 10 scale):  0  / 10   Medication Changes/New allergies or changes in medical history, any new surgeries or procedures? NO    If yes, update Summary List   Subjective Functional Status/Changes:  []  No changes reported     Pt reports using sit<->stand techniques during all transfers, and has noted improved tolerance/ability to rise from various chairs.           Modalities Rationale:   NA   min [] Estim, type/location:                                      []  att     []  unatt     []  w/US     []  w/ice    []  w/heat    min []  Mechanical Traction: type/lbs                   []  pro   []  sup   []  int   []  cont    []  before manual    []  after manual    min []  Ultrasound, settings/location:      min []  Iontophoresis w/ dexamethasone, location:                                               []  take home patch       []  in clinic    min []  Ice     []  Heat    location/position:     min []  Vasopneumatic Device, press/temp:     min []  Other:    [] Skin assessment post-treatment (if applicable):    []  intact    []  redness- no adverse reaction     []redness - adverse reaction:        40(25) min Therapeutic Exercise:  []  See flow sheet   Rationale:        Billed With/As:   [] TE   [] TA   [] Neuro   [] Self Care Patient Education: [x] Review HEP    [] Progressed/Changed HEP based on:   [] positioning   [] body mechanics   [] transfers   [] heat/ice application    [] other:      Other Objective/Functional Measures:    Performed 2 sets of 5 sit<->stand without using UEs. Post Treatment Pain Level (on 0 to 10) scale:   4  / 10     ASSESSMENT  Assessment/Changes in Function:     Pt performed all therex without SOB or c/o fatigue. She was noted to have decreased AROM/endurance with L LE DF, which may be playing into her difficulty with balance. []  See Progress Note/Recertification   Patient will continue to benefit from skilled PT services to modify and progress therapeutic interventions, address functional mobility deficits, address ROM deficits, address strength deficits, analyze and address soft tissue restrictions, analyze and cue movement patterns, analyze and modify body mechanics/ergonomics, assess and modify postural abnormalities and address imbalance/dizziness to attain remaining goals. Progress toward goals / Updated goals:    Pt is showing good exercise tolerance, and improved balance/strength.      PLAN  [x]  Upgrade activities as tolerated YES Continue plan of care   []  Discharge due to :    []  Other:      Therapist: Lynn Lai PT    Date: 7/3/2019 Time: 10:56 AM     Future Appointments   Date Time Provider Gui Woodward   7/3/2019  1:30 PM Leigh Ann Tsai, PT [facility]   7/8/2019  2:00 PM Leigh Ann Tsai, PT [facility]   7/11/2019  1:00 PM Leigh Ann Tsai, PT [facility]   7/15/2019 10:00 AM Sara Bruno PA-C [address]   7/16/2019 12:00 PM Caesar Jer, PT [facility]   7/19/2019 12:30 PM Annalisaar Jer, PT [facility]   7/23/2019 11:30 AM Caesar Jer, PT [facility]   7/26/2019 11:30 AM Caesar Jer, PT [facility]   7/29/2019 12:00 PM Leigh Ann Tsai, PT [facility]   7/31/2019 12:00 PM Gisella Dunbar, [facility] [place]

## 2019-07-08 ENCOUNTER — HOSPITAL ENCOUNTER (OUTPATIENT)
Dept: PHYSICAL THERAPY | Age: 74
Discharge: HOME | End: 2019-07-08
Payer: MEDICARE

## 2019-07-08 PROCEDURE — 97110 THERAPEUTIC EXERCISES: CPT | Performed by: PHYSICAL THERAPIST

## 2019-07-08 NOTE — PROGRESS NOTES
PHYSICAL THERAPY - DAILY TREATMENT NOTE    Patient Name: Usha Doss        Date: 2019  : 1945   YES Patient  Verified  Visit #:     Insurance: Payor: Qing Enamorado / Plan: [address] [place] [address] HMO / Product Type: Managed Care Medicare /      In time: 2:05 Out time: 2:40   Total Treatment Time: 35     BCBS/Medicare Time Tracking (below)   Total Timed Codes (min):  25 1:1 Treatment Time:  25     TREATMENT AREA =  Other abnormalities of gait and mobility [R26.89]    SUBJECTIVE  Pain Level (on 0 to 10 scale):  0  / 10   Medication Changes/New allergies or changes in medical history, any new surgeries or procedures? NO    If yes, update Summary List   Subjective Functional Status/Changes:  []  No changes reported     Pt reports still having some difficulty when getting up from chairs when going out to eat.           Modalities Rationale:   NA   min [] Estim, type/location:                                      []  att     []  unatt     []  w/US     []  w/ice    []  w/heat    min []  Mechanical Traction: type/lbs                   []  pro   []  sup   []  int   []  cont    []  before manual    []  after manual    min []  Ultrasound, settings/location:      min []  Iontophoresis w/ dexamethasone, location:                                               []  take home patch       []  in clinic    min []  Ice     []  Heat    location/position:     min []  Vasopneumatic Device, press/temp:     min []  Other:    [] Skin assessment post-treatment (if applicable):    []  intact    []  redness- no adverse reaction     []redness - adverse reaction:        35(25) min Therapeutic Exercise:  []  See flow sheet   Rationale:        Billed With/As:   [] TE   [] TA   [] Neuro   [] Self Care Patient Education: [x] Review HEP    [] Progressed/Changed HEP based on:   [] positioning   [] body mechanics   [] transfers   [] heat/ice application    [] other:      Other Objective/Functional Measures:    Pt able to perform sit<->stand 2x6 without using UEs     Post Treatment Pain Level (on 0 to 10) scale:   0  / 10     ASSESSMENT  Assessment/Changes in Function:     Pt requires VCs to perform sit->stand correctly     []  See Progress Note/Recertification   Patient will continue to benefit from skilled PT services to modify and progress therapeutic interventions, address functional mobility deficits, address ROM deficits, address strength deficits, analyze and address soft tissue restrictions, analyze and cue movement patterns, analyze and modify body mechanics/ergonomics, assess and modify postural abnormalities and address imbalance/dizziness to attain remaining goals. Progress toward goals / Updated goals:    Pt is showing improved strength and endurance.        PLAN  [x]  Upgrade activities as tolerated YES Continue plan of care   []  Discharge due to :    []  Other:      Therapist: Anya Smith PT    Date: 7/8/2019 Time: 10:56 AM     Future Appointments   Date Time Provider Gui Woodward   7/8/2019  2:00 PM Jackeline Castellon, PT [facility] [place]   7/11/2019  1:00 PM Jackeline Castellon, PT [facility]   7/15/2019 10:00 AM Xavier Bruno PA-C [address]   7/16/2019 12:00 PM Natalya Landing, PT [facility]   7/19/2019 12:30 PM Natalya yTson, PT [facility]   7/23/2019 11:30 AM Natalya Landing, PT [facility]   7/26/2019 11:30 AM Natalya Tyson, PT [facility]   7/29/2019 12:00 PM Jackeline Castellon, PT [facility]   7/31/2019 12:00 PM Ambreen Dunbar, PT [facility]

## 2019-07-11 ENCOUNTER — HOSPITAL ENCOUNTER (OUTPATIENT)
Dept: PHYSICAL THERAPY | Age: 74
Discharge: HOME | End: 2019-07-11
Payer: MEDICARE

## 2019-07-11 PROCEDURE — 97110 THERAPEUTIC EXERCISES: CPT | Performed by: PHYSICAL THERAPIST

## 2019-07-11 NOTE — PROGRESS NOTES
PHYSICAL THERAPY - DAILY TREATMENT NOTE    Patient Name: Usha Doss        Date: 2019  : 1945   YES Patient  Verified  Visit #:     Insurance: Payor: Qing Enamorado / Plan: [address] [place] [address] HMO / Product Type: Managed Care Medicare /      In time: 1:00 Out time: 1:40   Total Treatment Time: 40     BCBS/Medicare Time Tracking (below)   Total Timed Codes (min):  40 1:1 Treatment Time:  40     TREATMENT AREA =  Other abnormalities of gait and mobility [R26.89]    SUBJECTIVE  Pain Level (on 0 to 10 scale):  0  / 10   Medication Changes/New allergies or changes in medical history, any new surgeries or procedures? NO    If yes, update Summary List   Subjective Functional Status/Changes:  []  No changes reported     Pt reports difficulty getting out of the car when arriving at PT today. It took several trials to get out of the car.           Modalities Rationale:   NA   min [] Estim, type/location:                                      []  att     []  unatt     []  w/US     []  w/ice    []  w/heat    min []  Mechanical Traction: type/lbs                   []  pro   []  sup   []  int   []  cont    []  before manual    []  after manual    min []  Ultrasound, settings/location:      min []  Iontophoresis w/ dexamethasone, location:                                               []  take home patch       []  in clinic    min []  Ice     []  Heat    location/position:     min []  Vasopneumatic Device, press/temp:     min []  Other:    [] Skin assessment post-treatment (if applicable):    []  intact    []  redness- no adverse reaction     []redness - adverse reaction:        40 min Therapeutic Exercise:  []  See flow sheet   Rationale:        Billed With/As:   [] TE   [] TA   [] Neuro   [] Self Care Patient Education: [x] Review HEP    [] Progressed/Changed HEP based on:   [] positioning   [] body mechanics   [] transfers   [] heat/ice application    [] other:      Other Objective/Functional Measures:    Practiced getting in/out of car. Pt encouraged to use posterior upper right arma nd L hand on SPC to get out of car. She did well when she was miguel to get her trunk shift forward over her feet before risining. Post Treatment Pain Level (on 0 to 10) scale:   0  / 10     ASSESSMENT  Assessment/Changes in Function:     Pt had generally a more sluggish performance with all therex today. She was educated to recognize these \"bad days\" and be more careful with her walking (using SPC) and balance. []  See Progress Note/Recertification   Patient will continue to benefit from skilled PT services to modify and progress therapeutic interventions, address functional mobility deficits, address ROM deficits, address strength deficits, analyze and address soft tissue restrictions, analyze and cue movement patterns, analyze and modify body mechanics/ergonomics, assess and modify postural abnormalities and address imbalance/dizziness to attain remaining goals. Progress toward goals / Updated goals:    Session today focused more on education than exercise progression due to pt having generally lower energy levels.        PLAN  [x]  Upgrade activities as tolerated YES Continue plan of care   []  Discharge due to :    []  Other:      Therapist: Eliu Villar PT    Date: 7/11/2019 Time: 10:56 AM     Future Appointments   Date Time Provider Gui Woodward   7/11/2019  1:00 PM Ming Villarreal, PT [facility]   7/15/2019 10:00 AM Stanley Bruno PA-C [address]   7/16/2019 12:00 PM Ardyce Limb, PT [facility]   7/19/2019 12:30 PM Ardyce Limb, PT [facility]   7/23/2019 11:30 AM Ardyce Limb, PT [facility]   7/26/2019 11:30 AM Ardyce Limb, PT [facility]   7/29/2019 12:00 PM Ming Villarrela, PT [facility]   7/31/2019 12:00 PM Miah Dunbar, PT [facility]

## 2019-07-15 DIAGNOSIS — E03.9 ACQUIRED HYPOTHYROIDISM: ICD-10-CM

## 2019-07-15 NOTE — TELEPHONE ENCOUNTER
Patient called requesting refill on Levothyroxine  Patient last appointment was 4/28  Patient next appointment is   Pharmacy patient wants refill to go to is [facility] on [address]

## 2019-07-16 ENCOUNTER — HOSPITAL ENCOUNTER (OUTPATIENT)
Dept: PHYSICAL THERAPY | Age: 74
Discharge: HOME | End: 2019-07-16
Payer: MEDICARE

## 2019-07-16 PROCEDURE — 97110 THERAPEUTIC EXERCISES: CPT

## 2019-07-16 RX ORDER — LEVOTHYROXINE SODIUM 50 UG/1
TABLET ORAL
Qty: 30 TAB | Refills: 0 | Status: SHIPPED | OUTPATIENT
Start: 2019-07-16 | End: 2019-07-17 | Stop reason: SDUPTHER

## 2019-07-16 NOTE — PROGRESS NOTES
PHYSICAL THERAPY - DAILY TREATMENT NOTE    Patient Name: Bradley Clay        Date: 2019  : 1945   YES Patient  Verified  Visit #:     Insurance: Payor: Donemilee Page / Plan: [address] [place] [address] HMO / Product Type: Managed Care Medicare /      In time: 1200 Out time: 1240   Total Treatment Time: 40     BCBS/Medicare Time Tracking (below)   Total Timed Codes (min):  40 1:1 Treatment Time:  40     TREATMENT AREA =  Other abnormalities of gait and mobility [R26.89]    SUBJECTIVE  Pain Level (on 0 to 10 scale):  0  / 10   Medication Changes/New allergies or changes in medical history, any new surgeries or procedures?     NO    If yes, update Summary List   Subjective Functional Status/Changes:  []  No changes reported     I  Forget the cane a lot when I'm at home because I feel better walking but still need it when outside        Modalities Rationale:     decrease inflammation, decrease pain and increase tissue extensibility to improve patient's ability to perform ADLs   min [] Estim, type/location:                                      []  att     []  unatt     []  w/US     []  w/ice    []  w/heat    min []  Mechanical Traction: type/lbs                   []  pro   []  sup   []  int   []  cont    []  before manual    []  after manual    min []  Ultrasound, settings/location:      min []  Iontophoresis w/ dexamethasone, location:                                               []  take home patch       []  in clinic    min []  Ice     []  Heat    location/position:     min []  Vasopneumatic Device, press/temp:     min []  Other:    [x] Skin assessment post-treatment (if applicable):    [x]  intact    [x]  redness- no adverse reaction     []redness - adverse reaction:        40/40 min Therapeutic Exercise:  [x]  See flow sheet   Rationale:      increase ROM and increase strength to improve the patients ability to perform transfers, bed mobility and prolonged walking     Billed With/As:   [x] TE   [] TA   [] Neuro   [] Self Care Patient Education: [x] Review HEP    [] Progressed/Changed HEP based on:   [] positioning   [] body mechanics   [] transfers   [] heat/ice application    [x] other:      Other Objective/Functional Measures:    TE per FS  Added amb in tandem and Nustep   Post Treatment Pain Level (on 0 to 10) scale:   0  / 10     ASSESSMENT  Assessment/Changes in Function:     Cues required for slow and controlled mvmts. Able to self correct loss of balance when walking tandem     []  See Progress Note/Recertification   Patient will continue to benefit from skilled PT services to modify and progress therapeutic interventions, address functional mobility deficits, address ROM deficits, address strength deficits, analyze and address soft tissue restrictions, analyze and cue movement patterns, analyze and modify body mechanics/ergonomics, assess and modify postural abnormalities, address imbalance/dizziness and instruct in home and community integration to attain remaining goals.    Progress toward goals / Updated goals:    Met STG1     PLAN  [x]  Upgrade activities as tolerated YES Continue plan of care   []  Discharge due to :    []  Other:      Therapist: Gómez Oliver, PT, DPT, MTC, CMTPT    Date: 7/16/2019 Time: 7:21 PM     Future Appointments   Date Time Provider Gui Woodward   7/17/2019 10:30 AM Osei Bruno PA-C [address]   7/19/2019 12:30 PM Sarah Weathers, PT [facility]   7/23/2019 11:30 AM Sarah Weathers PT [facility]   7/26/2019 11:30 AM Sarah Weathers PT [facility]   7/29/2019 12:00 PM Med Zhu, PT [facility]   7/31/2019 12:00 PM Kate Dunbar, PT [facility]

## 2019-07-17 ENCOUNTER — OFFICE VISIT (OUTPATIENT)
Dept: INTERNAL MEDICINE CLINIC | Age: 74
End: 2019-07-17

## 2019-07-17 ENCOUNTER — TELEPHONE (OUTPATIENT)
Dept: INTERNAL MEDICINE CLINIC | Age: 74
End: 2019-07-17

## 2019-07-17 DIAGNOSIS — E53.8 B12 DEFICIENCY: Primary | ICD-10-CM

## 2019-07-17 DIAGNOSIS — B96.89 BV (BACTERIAL VAGINOSIS): ICD-10-CM

## 2019-07-17 DIAGNOSIS — E03.9 ACQUIRED HYPOTHYROIDISM: Primary | ICD-10-CM

## 2019-07-17 DIAGNOSIS — N76.0 BV (BACTERIAL VAGINOSIS): ICD-10-CM

## 2019-07-17 DIAGNOSIS — L30.9 DERMATITIS: ICD-10-CM

## 2019-07-17 DIAGNOSIS — K58.9 IRRITABLE BOWEL SYNDROME, UNSPECIFIED TYPE: ICD-10-CM

## 2019-07-17 DIAGNOSIS — E78.00 HYPERCHOLESTEROLEMIA: ICD-10-CM

## 2019-07-17 DIAGNOSIS — K21.9 GASTROESOPHAGEAL REFLUX DISEASE WITHOUT ESOPHAGITIS: ICD-10-CM

## 2019-07-17 DIAGNOSIS — D50.9 IRON DEFICIENCY ANEMIA, UNSPECIFIED IRON DEFICIENCY ANEMIA TYPE: ICD-10-CM

## 2019-07-17 DIAGNOSIS — E53.8 B12 DEFICIENCY: ICD-10-CM

## 2019-07-17 DIAGNOSIS — R60.1 GENERALIZED EDEMA: ICD-10-CM

## 2019-07-17 RX ORDER — LEVOTHYROXINE SODIUM 50 UG/1
TABLET ORAL
Qty: 30 TAB | Refills: 0 | Status: SHIPPED | OUTPATIENT
Start: 2019-07-17 | End: 2019-08-06 | Stop reason: SDUPTHER

## 2019-07-17 RX ORDER — HYDROCORTISONE 25 MG/G
CREAM TOPICAL 2 TIMES DAILY
COMMUNITY
End: 2019-07-17 | Stop reason: ALTCHOICE

## 2019-07-17 RX ORDER — DOXYLAMINE SUCCINATE 25 MG
TABLET ORAL 2 TIMES DAILY
Qty: 15 G | Status: CANCELLED | OUTPATIENT
Start: 2019-07-17

## 2019-07-17 RX ORDER — PHENOL/SODIUM PHENOLATE
20 AEROSOL, SPRAY (ML) MUCOUS MEMBRANE DAILY
Qty: 90 TAB | Refills: 1 | Status: SHIPPED | OUTPATIENT
Start: 2019-07-17 | End: 2020-01-23 | Stop reason: SDUPTHER

## 2019-07-17 RX ORDER — POTASSIUM CHLORIDE 750 MG/1
10 TABLET, EXTENDED RELEASE ORAL DAILY
Qty: 90 TAB | Refills: 1 | Status: SHIPPED | OUTPATIENT
Start: 2019-07-17 | End: 2020-01-23 | Stop reason: SDUPTHER

## 2019-07-17 RX ORDER — METRONIDAZOLE 500 MG/1
500 TABLET ORAL 2 TIMES DAILY
Qty: 14 TAB | Refills: 0 | Status: SHIPPED | OUTPATIENT
Start: 2019-07-17 | End: 2019-07-24

## 2019-07-17 RX ORDER — POTASSIUM CHLORIDE 750 MG/1
10 TABLET, EXTENDED RELEASE ORAL DAILY
COMMUNITY
End: 2019-07-17 | Stop reason: SDUPTHER

## 2019-07-17 RX ORDER — SUCRALFATE 1 G/1
1 TABLET ORAL 4 TIMES DAILY
Qty: 360 TAB | Refills: 1 | Status: SHIPPED | OUTPATIENT
Start: 2019-07-17 | End: 2020-01-23 | Stop reason: SDUPTHER

## 2019-07-17 RX ORDER — FUROSEMIDE 20 MG/1
20 TABLET ORAL DAILY
Qty: 90 TAB | Refills: 1 | Status: SHIPPED | OUTPATIENT
Start: 2019-07-17 | End: 2020-01-23 | Stop reason: SDUPTHER

## 2019-07-17 RX ORDER — OMEPRAZOLE 20 MG/1
CAPSULE, DELAYED RELEASE ORAL
Qty: 90 CAP | Refills: 1 | Status: CANCELLED | OUTPATIENT
Start: 2019-07-17

## 2019-07-17 RX ORDER — HYDROCORTISONE 25 MG/G
CREAM TOPICAL 2 TIMES DAILY
Qty: 30 G | Status: CANCELLED | OUTPATIENT
Start: 2019-07-17

## 2019-07-17 RX ORDER — ROPINIROLE 0.5 MG/1
TABLET, FILM COATED ORAL 3 TIMES DAILY
COMMUNITY
End: 2020-10-22 | Stop reason: SDUPTHER

## 2019-07-17 RX ORDER — CLOTRIMAZOLE AND BETAMETHASONE DIPROPIONATE 10; .64 MG/G; MG/G
CREAM TOPICAL
Qty: 45 G | Refills: 1 | Status: SHIPPED | OUTPATIENT
Start: 2019-07-17 | End: 2020-01-23 | Stop reason: SDUPTHER

## 2019-07-17 RX ORDER — DOXYLAMINE SUCCINATE 25 MG
TABLET ORAL 2 TIMES DAILY
COMMUNITY
End: 2019-07-17 | Stop reason: ALTCHOICE

## 2019-07-17 RX ORDER — ROSUVASTATIN CALCIUM 40 MG/1
40 TABLET, COATED ORAL
Qty: 90 TAB | Refills: 1 | Status: SHIPPED | OUTPATIENT
Start: 2019-07-17 | End: 2020-01-23 | Stop reason: SDUPTHER

## 2019-07-17 NOTE — TELEPHONE ENCOUNTER
[facility] called stating the pt has an allergy to an ingredient in Lotrisone cream. Explained to pharmacy tech the pt was using the cream already from another provider and asked for a refill. Gave ok to fill.

## 2019-07-17 NOTE — PROGRESS NOTES
Chief Complaint   Patient presents with    Medication Refill     Fasting    Vaginal Discharge     Clear, odor. Not sexualy active. Incontinent of stool. 1. Have you been to the ER, urgent care clinic since your last visit? Hospitalized since your last visit? Yes Where: Admitted April for aspiration pneumonia. 2. Have you seen or consulted any other health care providers outside of the [address] [place] [address] since your last visit? Include any pap smears or colon screening.  Yes When: Neurology

## 2019-07-18 ENCOUNTER — TELEPHONE (OUTPATIENT)
Dept: INTERNAL MEDICINE CLINIC | Age: 74
End: 2019-07-18

## 2019-07-18 DIAGNOSIS — K21.9 GASTROESOPHAGEAL REFLUX DISEASE WITHOUT ESOPHAGITIS: ICD-10-CM

## 2019-07-18 LAB
ALBUMIN SERPL-MCNC: 4.1 G/DL (ref 3.5–4.8)
ALBUMIN/GLOB SERPL: 1.6 {RATIO} (ref 1.2–2.2)
ALP SERPL-CCNC: 135 IU/L (ref 39–117)
ALT SERPL-CCNC: 13 IU/L (ref 0–32)
AST SERPL-CCNC: 15 IU/L (ref 0–40)
BASOPHILS # BLD AUTO: 0 X10E3/UL (ref 0–0.2)
BASOPHILS NFR BLD AUTO: 1 %
BILIRUB SERPL-MCNC: 0.4 MG/DL (ref 0–1.2)
BUN SERPL-MCNC: 20 MG/DL (ref 8–27)
BUN/CREAT SERPL: 15 (ref 12–28)
CALCIUM SERPL-MCNC: 9.4 MG/DL (ref 8.7–10.3)
CHLORIDE SERPL-SCNC: 101 MMOL/L (ref 96–106)
CHOLEST SERPL-MCNC: 117 MG/DL (ref 100–199)
CO2 SERPL-SCNC: 27 MMOL/L (ref 20–29)
CREAT SERPL-MCNC: 1.37 MG/DL (ref 0.57–1)
EOSINOPHIL # BLD AUTO: 0.1 X10E3/UL (ref 0–0.4)
EOSINOPHIL NFR BLD AUTO: 2 %
ERYTHROCYTE [DISTWIDTH] IN BLOOD BY AUTOMATED COUNT: 15.4 % (ref 12.3–15.4)
FERRITIN SERPL-MCNC: 42 NG/ML (ref 15–150)
GLOBULIN SER CALC-MCNC: 2.5 G/DL (ref 1.5–4.5)
GLUCOSE SERPL-MCNC: 87 MG/DL (ref 65–99)
HCT VFR BLD AUTO: 39 % (ref 34–46.6)
HDLC SERPL-MCNC: 66 MG/DL
HGB BLD-MCNC: 12.1 G/DL (ref 11.1–15.9)
IMM GRANULOCYTES # BLD AUTO: 0 X10E3/UL (ref 0–0.1)
IMM GRANULOCYTES NFR BLD AUTO: 0 %
IRON SATN MFR SERPL: 36 % (ref 15–55)
IRON SERPL-MCNC: 120 UG/DL (ref 27–139)
LDLC SERPL CALC-MCNC: 37 MG/DL (ref 0–99)
LYMPHOCYTES # BLD AUTO: 0.9 X10E3/UL (ref 0.7–3.1)
LYMPHOCYTES NFR BLD AUTO: 16 %
MCH RBC QN AUTO: 28.8 PG (ref 26.6–33)
MCHC RBC AUTO-ENTMCNC: 31 G/DL (ref 31.5–35.7)
MCV RBC AUTO: 93 FL (ref 79–97)
MONOCYTES # BLD AUTO: 0.4 X10E3/UL (ref 0.1–0.9)
MONOCYTES NFR BLD AUTO: 7 %
NEUTROPHILS # BLD AUTO: 4.2 X10E3/UL (ref 1.4–7)
NEUTROPHILS NFR BLD AUTO: 74 %
PLATELET # BLD AUTO: 218 X10E3/UL (ref 150–450)
POTASSIUM SERPL-SCNC: 4.7 MMOL/L (ref 3.5–5.2)
PROT SERPL-MCNC: 6.6 G/DL (ref 6–8.5)
RBC # BLD AUTO: 4.2 X10E6/UL (ref 3.77–5.28)
SODIUM SERPL-SCNC: 140 MMOL/L (ref 134–144)
TIBC SERPL-MCNC: 338 UG/DL (ref 250–450)
TRIGL SERPL-MCNC: 71 MG/DL (ref 0–149)
TSH SERPL DL<=0.005 MIU/L-ACNC: 0.85 UIU/ML (ref 0.45–4.5)
UIBC SERPL-MCNC: 218 UG/DL (ref 118–369)
VIT B12 SERPL-MCNC: 380 PG/ML (ref 232–1245)
VLDLC SERPL CALC-MCNC: 14 MG/DL (ref 5–40)
WBC # BLD AUTO: 5.7 X10E3/UL (ref 3.4–10.8)

## 2019-07-18 RX ORDER — OMEPRAZOLE 20 MG/1
20 CAPSULE, DELAYED RELEASE ORAL DAILY
Qty: 90 CAP | Refills: 1 | Status: SHIPPED | OUTPATIENT
Start: 2019-07-18 | End: 2020-01-23 | Stop reason: SDUPTHER

## 2019-07-19 ENCOUNTER — HOSPITAL ENCOUNTER (OUTPATIENT)
Dept: PHYSICAL THERAPY | Age: 74
Discharge: HOME | End: 2019-07-19
Payer: MEDICARE

## 2019-07-19 PROCEDURE — 97110 THERAPEUTIC EXERCISES: CPT

## 2019-07-19 RX ORDER — CYANOCOBALAMIN 1000 UG/ML
1000 INJECTION, SOLUTION INTRAMUSCULAR; SUBCUTANEOUS
Qty: 10 ML | Refills: 0
Start: 2019-07-19 | End: 2020-01-23 | Stop reason: SDUPTHER

## 2019-07-19 NOTE — PROGRESS NOTES
PHYSICAL THERAPY - DAILY TREATMENT NOTE    Patient Name: Lauro Soto        Date: 2019  : 1945   YES Patient  Verified  Visit #:     Insurance: Payor: Yasmine Holguinjonodonna / Plan: [address] [place] [address] HMO / Product Type: Managed Care Medicare /      In time: 6151 Out time: 120   Total Treatment Time: 45     BCBS/Medicare Time Tracking (below)   Total Timed Codes (min):  45 1:1 Treatment Time:  30     TREATMENT AREA =  Other abnormalities of gait and mobility [R26.89]    SUBJECTIVE  Pain Level (on 0 to 10 scale):  4  / 10   Medication Changes/New allergies or changes in medical history, any new surgeries or procedures?     NO    If yes, update Summary List   Subjective Functional Status/Changes:  []  No changes reported     I forget about my cane a lot when I'm at home        Modalities Rationale:     decrease inflammation, decrease pain and increase tissue extensibility to improve patient's ability to perform ADLs   min [] Estim, type/location:                                      []  att     []  unatt     []  w/US     []  w/ice    []  w/heat    min []  Mechanical Traction: type/lbs                   []  pro   []  sup   []  int   []  cont    []  before manual    []  after manual    min []  Ultrasound, settings/location:      min []  Iontophoresis w/ dexamethasone, location:                                               []  take home patch       []  in clinic    min []  Ice     []  Heat    location/position:     min []  Vasopneumatic Device, press/temp:     min []  Other:    [x] Skin assessment post-treatment (if applicable):    [x]  intact    [x]  redness- no adverse reaction     []redness - adverse reaction:        45/30 min Therapeutic Exercise:  [x]  See flow sheet   Rationale:      increase ROM and increase strength to improve the patients ability to perform transfers, bed mobility and prolonged walking     Billed With/As:   [x] TE   [] TA   [] Neuro   [] Self Care Patient Education: [x] Review HEP    [] Progressed/Changed HEP based on:   [] positioning   [] body mechanics   [] transfers   [] heat/ice application    [x] other: use cane when making turns at home in between rooms     Other Objective/Functional Measures:    TE per FS  Added toe taps and performed step ups without UE   Post Treatment Pain Level (on 0 to 10) scale:   0  / 10     ASSESSMENT  Assessment/Changes in Function:     Good dynamic stability when moving slowly with control. LOB noted as posture fatigues and FHRS posture worsens. []  See Progress Note/Recertification   Patient will continue to benefit from skilled PT services to modify and progress therapeutic interventions, address functional mobility deficits, address ROM deficits, address strength deficits, analyze and address soft tissue restrictions, analyze and cue movement patterns, analyze and modify body mechanics/ergonomics, assess and modify postural abnormalities, address imbalance/dizziness and instruct in home and community integration to attain remaining goals.    Progress toward goals / Updated goals:    Progressing towards LTG3, no falls since starting PT     PLAN  [x]  Upgrade activities as tolerated YES Continue plan of care   []  Discharge due to :    []  Other:      Therapist: Radha Mendes PT, DPT, MTC, CMTPT    Date: 7/19/2019 Time: 7:21 PM     Future Appointments   Date Time Provider Gui Woodward   7/23/2019 11:30 AM Reece Essex, PT [facility]   7/26/2019 11:30 AM Reece Essex, PT [facility]   7/29/2019 12:00 PM Cristy Bah PT [facility]   7/31/2019 12:00 PM Cristy Bah PT [facility]

## 2019-07-23 ENCOUNTER — HOSPITAL ENCOUNTER (OUTPATIENT)
Dept: PHYSICAL THERAPY | Age: 74
Discharge: HOME | End: 2019-07-23
Payer: MEDICARE

## 2019-07-23 VITALS
HEART RATE: 56 BPM | BODY MASS INDEX: 31.86 KG/M2 | DIASTOLIC BLOOD PRESSURE: 83 MMHG | OXYGEN SATURATION: 96 % | HEIGHT: 67 IN | RESPIRATION RATE: 16 BRPM | SYSTOLIC BLOOD PRESSURE: 139 MMHG | WEIGHT: 203 LBS | TEMPERATURE: 97.5 F

## 2019-07-23 PROCEDURE — 97110 THERAPEUTIC EXERCISES: CPT

## 2019-07-23 NOTE — PATIENT INSTRUCTIONS

## 2019-07-23 NOTE — PROGRESS NOTES
HISTORY OF PRESENT ILLNESS  Rae Moran is a 69 y.o. female. HPI   Pt is her efor med refills and labs for her thyroid, cholesterol, IBS, and edema. She is fasting for labs and denies any concerns other than a clear vaginal discharge for the past few weeks. She is not sexually active. Denies dysuria, hematuria, vaginal pain, urinary frequency, itching, rash, recent abx use, and pelvic pain. Will treat for BV and if not better pt will return. She also has a hx of b12 and iron deficiency but has not been tested in awhile. Allergies   Allergen Reactions    Erythromycin Rash         Current Outpatient Medications   Medication Sig    levothyroxine (SYNTHROID) 50 mcg tablet TAKE 1 TABLET BY MOUTH ONCE DAILY BEFORE BREAKFAST    rosuvastatin (CRESTOR) 40 mg tablet Take 1 Tab by mouth nightly.  sucralfate (CARAFATE) 1 gram tablet Take 1 Tab by mouth four (4) times daily.  furosemide (LASIX) 20 mg tablet Take 1 Tab by mouth daily.  Omeprazole delayed release (PRILOSEC D/R) 20 mg tablet Take 1 Tab by mouth daily.  rOPINIRole (REQUIP) 0.5 mg tablet Take  by mouth three (3) times daily.  ferrous sulfate (IRON) 325 mg (65 mg iron) cpER Take  by mouth.  potassium chloride (KLOR-CON) 10 mEq tablet Take 1 Tab by mouth daily.  clotrimazole-betamethasone (LOTRISONE) topical cream Apply to rash bid    metroNIDAZOLE (FLAGYL) 500 mg tablet Take 1 Tab by mouth two (2) times a day for 7 days.  valbenazine (INGREZZA) 80 mg cap Take  by mouth.  magnesium oxide (MAG-OX) 400 mg tablet Take 400 mg by mouth.  lamoTRIgine (LAMICTAL) 200 mg tablet Take 200 mg by mouth two (2) times a day.  donepezil (ARICEPT) 10 mg tablet Take 10 mg by mouth nightly.  propranolol (INDERAL) 60 mg tablet Take 10 mg by mouth two (2) times a day.  lurasidone (LATUDA) 60 mg tab tablet Take 80 mg by mouth.  doxepin (SINEQUAN) 25 mg capsule Take  by mouth nightly.     hydrOXYzine pamoate (VISTARIL) 25 mg capsule Take 25 mg by mouth nightly.  cyanocobalamin (VITAMIN B12) 1,000 mcg/mL injection 1 mL by IntraMUSCular route every fourteen (14) days.  omeprazole (PRILOSEC) 20 mg capsule Take 1 Cap by mouth daily.  guaiFENesin (ROBITUSSIN) 100 mg/5 mL liquid Take 200 mg by mouth. No current facility-administered medications for this visit. ROS  Visit Vitals  /83 (BP 1 Location: Right arm, BP Patient Position: Sitting)   Pulse (!) 56   Temp 97.5 °F (36.4 °C) (Oral)   Resp 16   Ht 5' 7\" (1.702 m)   Wt 203 lb (92.1 kg)   SpO2 96%   BMI 31.79 kg/m²       Physical Exam    ASSESSMENT and PLAN    ICD-10-CM ICD-9-CM    1. Acquired hypothyroidism E03.9 244.9 levothyroxine (SYNTHROID) 50 mcg tablet      TSH 3RD GENERATION      TSH 3RD GENERATION   2. Hypercholesterolemia E78.00 272.0 rosuvastatin (CRESTOR) 40 mg tablet      METABOLIC PANEL, COMPREHENSIVE      LIPID PANEL      LIPID PANEL      METABOLIC PANEL, COMPREHENSIVE   3. Irritable bowel syndrome, unspecified type K58.9 564.1 sucralfate (CARAFATE) 1 gram tablet      CBC WITH AUTOMATED DIFF      CBC WITH AUTOMATED DIFF   4. Generalized edema R60.1 782.3 furosemide (LASIX) 20 mg tablet      potassium chloride (KLOR-CON) 10 mEq tablet      CBC WITH AUTOMATED DIFF      CBC WITH AUTOMATED DIFF   5. Gastroesophageal reflux disease without esophagitis K21.9 530.81 Omeprazole delayed release (PRILOSEC D/R) 20 mg tablet      CBC WITH AUTOMATED DIFF      CBC WITH AUTOMATED DIFF   6. Dermatitis L30.9 692.9 clotrimazole-betamethasone (LOTRISONE) topical cream   7. BV (bacterial vaginosis) N76.0 616.10 metroNIDAZOLE (FLAGYL) 500 mg tablet    B96.89 041.9    8. Iron deficiency anemia, unspecified iron deficiency anemia type D50.9 280.9 IRON PROFILE      FERRITIN      FERRITIN      IRON PROFILE   9. B12 deficiency E53.8 266.2 VITAMIN B12      VITAMIN B12     Follow-up and Dispositions    · Return in about 6 months (around 1/17/2020) for follow up.        Pt expressed understanding of visit summary and plans for any follow ups or referrals as well as any medications prescribed.

## 2019-07-26 ENCOUNTER — HOSPITAL ENCOUNTER (OUTPATIENT)
Dept: PHYSICAL THERAPY | Age: 74
Discharge: HOME | End: 2019-07-26
Payer: MEDICARE

## 2019-07-26 PROCEDURE — 97110 THERAPEUTIC EXERCISES: CPT

## 2019-07-26 NOTE — PROGRESS NOTES
[facility] PHYSICAL THERAPY  Kunal Gianna [facility] [address] [facility] [address] [place] [address] Road -   Phone: [phone]  Fax: [phone]  [x]  PROGRESS NOTE  []  DISCHARGE SUMMARY  Patient Name: Mary Chase : 1945   Treatment Diagnosis: Other abnormalities of gait and mobility [R26.89]     Referral Source: Chris Butt, *     Date of Initial Visit: 19 Attended Visits: 9 Missed Visits: 0     SUMMARY OF TREATMENT  Therapeutic exercises including ROM, strengthening and stretching; gait and balance training, postural re-education, modalities: none, HEP instruction. CURRENT STATUS  The patient is a 68 y/o female who presents to Pt with a diagnosis of gait imbalance and peripheral neuropathy. She has made excellent progress in PT and has reported no fall since beginning therapy. She notes improved gait stability and often forgets her cane when she is walking around the house. She is able to safely negotiate stairs using a reciprocal gait pattern while holding on to 1 railing and can now perform sit <> stand without UE assistance. She cont to require cueing to stand fully erect and prevent L lateral/forward trunk lean when fatigued. Both static and dynamic balance have steadily improved and she is able to ambulate with supervision while performing secondary tasks and self correct LOB. Goal/Measure of Progress Goal Met? 1. Patient to be independent and compliant with initial HEP to prevent further disability. Status at last Eval: Established Current Status: I with HEP yes   2. Pt/dtr to report ability of pt to rise from various chairs on first trial using newly learned technique. Status at last Eval: Unable to perform Current Status: Safe sit <> stand yes   3. Pt to increase FOTO score from 39 to >/= 48. Status at last Eval: 39 Current Status: 48 yes   4. Pt to reports no falls for 6 weeks.    Status at last Eval: -- Current Status: No falls in 4 weeks progressing     New Goals to be achieved in __2-3__  weeks:  1. Patient to be independent & compliant with a progressive, high level HEP in order to maintain gains made in physical therapy. 2.  Pt able to sit<->stand form chair 5 times without using UEs to show improved safety with transfers. 3.  Pt to reports no falls for 6 weeks. RECOMMENDATIONS  Continue therapy per initial plan/protocol    Specifics: The patient could benefit from continued PT 2-3x/week for 2-3 weeks to progress towards achieving all LTGs. If you have any questions/comments please contact us directly at [phone]. Thank you for allowing us to assist in the care of your patient. Therapist Signature: Kyler Denton, PT, DPT, MTC, CMTPT Date: 7/26/2019   Reporting Period: 6/26/19 to 7/26/19 Time: 12:48 PM   NOTE TO PHYSICIAN:  PLEASE COMPLETE THE ORDERS BELOW AND FAX TO   [facility] Physical Therapy: [phone]  If you are unable to process this request in 24 hours please contact our office: [phone]    ___ I have read the above report and request that my patient continue as recommended.   ___ I have read the above report and request that my patient continue therapy with the following changes/special instructions:_________________________________________________________   ___ I have read the above report and request that my patient be discharged from therapy.      Physician Signature:        Date:       Time:

## 2019-07-26 NOTE — PROGRESS NOTES
PHYSICAL THERAPY - DAILY TREATMENT NOTE    Patient Name: Joann Rivera        Date: 2019  : 1945   YES Patient  Verified  Visit #:     Insurance: Payor: Sissy Kidd / Plan: [address] [place] [address] HMO / Product Type: Managed Care Medicare /      In time: 26 Out time:    Total Treatment Time: 45     BCBS/Medicare Time Tracking (below)   Total Timed Codes (min):  45 1:1 Treatment Time:  40     TREATMENT AREA =  Other abnormalities of gait and mobility [R26.89]    SUBJECTIVE  Pain Level (on 0 to 10 scale):  0  / 10   Medication Changes/New allergies or changes in medical history, any new surgeries or procedures? NO    If yes, update Summary List   Subjective Functional Status/Changes:  []  No changes reported     No falls since starting PT. Patient reports she is walking more at home without the cane and feels more stable. She notes increased ease when standing up from a chair. My L leg still feels really tight from the tremor meds.  Both legs were so tight yesterday that I was in tears        Modalities Rationale:     decrease inflammation, decrease pain and increase tissue extensibility to improve patient's ability to perform ADLs   min [] Estim, type/location:                                      []  att     []  unatt     []  w/US     []  w/ice    []  w/heat    min []  Mechanical Traction: type/lbs                   []  pro   []  sup   []  int   []  cont    []  before manual    []  after manual    min []  Ultrasound, settings/location:      min []  Iontophoresis w/ dexamethasone, location:                                               []  take home patch       []  in clinic    min []  Ice     []  Heat    location/position:     min []  Vasopneumatic Device, press/temp:     min []  Other:    [x] Skin assessment post-treatment (if applicable):    [x]  intact    [x]  redness- no adverse reaction     []redness - adverse reaction:        45/40 min Therapeutic Exercise:  [x]  See flow sheet   Rationale:      increase ROM and increase strength to improve the patients ability to perform transfers, bed mobility and prolonged walking     Billed With/As:   [x] TE   [] TA   [] Neuro   [] Self Care Patient Education: [x] Review HEP    [] Progressed/Changed HEP based on:   [] positioning   [] body mechanics   [] transfers   [] heat/ice application    [x] other: use cane when making turns at home in between rooms     Other Objective/Functional Measures:    TE per FS  FOTO 48   Post Treatment Pain Level (on 0 to 10) scale:   0  / 10     ASSESSMENT  Assessment/Changes in Function:     Able to perform sit <> stand from fully lowered mat. Cont to require 25% cues to stand full erect with inc quad/glut recruitment during sit> stand   [x]  See Progress Note/Recertification   Patient will continue to benefit from skilled PT services to modify and progress therapeutic interventions, address functional mobility deficits, address ROM deficits, address strength deficits, analyze and address soft tissue restrictions, analyze and cue movement patterns, analyze and modify body mechanics/ergonomics, assess and modify postural abnormalities, address imbalance/dizziness and instruct in home and community integration to attain remaining goals.    Progress toward goals / Updated goals:    FOTO 50     PLAN  [x]  Upgrade activities as tolerated YES Continue plan of care   []  Discharge due to :    [x]  Other: Plan to DC next week     Therapist: Lloyd Slaughter, PT, DPT, MTC, CMTPT    Date: 7/26/2019 Time: 7:21 PM     Future Appointments   Date Time Provider Gui Woodward   7/29/2019 12:00 PM Jaelyn [facility]   7/31/2019 12:00 PM Annmarie Dunbar, PT [facility]

## 2019-07-29 ENCOUNTER — HOSPITAL ENCOUNTER (OUTPATIENT)
Dept: PHYSICAL THERAPY | Age: 74
Discharge: HOME | End: 2019-07-29
Payer: MEDICARE

## 2019-07-29 PROCEDURE — 97110 THERAPEUTIC EXERCISES: CPT | Performed by: PHYSICAL THERAPIST

## 2019-07-29 NOTE — PROGRESS NOTES
PHYSICAL THERAPY - DAILY TREATMENT NOTE    Patient Name: Ruchi Betts        Date: 2019  : 1945   YES Patient  Verified  Visit #:   10   of   18  Insurance: Payor: Dolly Agent / Plan: [address] [place] [address] HMO / Product Type: Managed Care Medicare /      In time: 12:05 Out time: 12:55   Total Treatment Time: 45     BCBS/Medicare Time Tracking (below)   Total Timed Codes (min):  45 1:1 Treatment Time:  45     TREATMENT AREA =  Other abnormalities of gait and mobility [R26.89]    SUBJECTIVE  Pain Level (on 0 to 10 scale):  0  / 10   Medication Changes/New allergies or changes in medical history, any new surgeries or procedures? NO    If yes, update Summary List   Subjective Functional Status/Changes:  []  No changes reported     Pt reports doing better with sit<->stand at home and at restaurants. She has not had any falls.           Modalities Rationale:   NA   min [] Estim, type/location:                                      []  att     []  unatt     []  w/US     []  w/ice    []  w/heat    min []  Mechanical Traction: type/lbs                   []  pro   []  sup   []  int   []  cont    []  before manual    []  after manual    min []  Ultrasound, settings/location:      min []  Iontophoresis w/ dexamethasone, location:                                               []  take home patch       []  in clinic    min []  Ice     []  Heat    location/position:     min []  Vasopneumatic Device, press/temp:     min []  Other:    [] Skin assessment post-treatment (if applicable):    []  intact    []  redness- no adverse reaction     []redness - adverse reaction:        45 min Therapeutic Exercise:  []  See flow sheet   Rationale:        Billed With/As:   [] TE   [] TA   [] Neuro   [] Self Care Patient Education: [x] Review HEP    [] Progressed/Changed HEP based on:   [] positioning   [] body mechanics   [] transfers   [] heat/ice application    [] other:      Other Objective/Functional Measures:    Pt performed sit<->stand from chair without UEs for one set of 10, but tired quickly during second set, and had to stop at 5. Post Treatment Pain Level (on 0 to 10) scale:   0  / 10     ASSESSMENT  Assessment/Changes in Function:     Pt demonstrated ability to self-mange her ability to rise from chair by improving YVONNE by moving feet back toward the chair. She required VCs to perform sidestepping over hurdles without LOB. []  See Progress Note/Recertification   Patient will continue to benefit from skilled PT services to modify and progress therapeutic interventions, address functional mobility deficits, address ROM deficits, address strength deficits, analyze and address soft tissue restrictions, analyze and cue movement patterns, analyze and modify body mechanics/ergonomics, assess and modify postural abnormalities and address imbalance/dizziness to attain remaining goals. Progress toward goals / Updated goals:    Pt showing improved strength/endurance/balance and ability to properly align her body over feet to allow sit<->stand transfers.        PLAN  [x]  Upgrade activities as tolerated YES Continue plan of care   []  Discharge due to :    []  Other:      Therapist: Enoch Lloyd PT    Date: 7/29/2019 Time: 10:56 AM     Future Appointments   Date Time Provider Gui Woodward   7/29/2019 12:00 PM Henrietta Cooper [facility] [place]   7/31/2019 12:00 PM Ainsley Dunbar, PT [facility] [place]

## 2019-07-31 ENCOUNTER — HOSPITAL ENCOUNTER (OUTPATIENT)
Dept: PHYSICAL THERAPY | Age: 74
Discharge: HOME | End: 2019-07-31
Payer: MEDICARE

## 2019-07-31 PROCEDURE — 97110 THERAPEUTIC EXERCISES: CPT | Performed by: PHYSICAL THERAPIST

## 2019-08-01 DIAGNOSIS — E03.9 ACQUIRED HYPOTHYROIDISM: ICD-10-CM

## 2019-08-05 NOTE — TELEPHONE ENCOUNTER
Pt is calling back because she has not heard anything about her lab results. Pt stated that she was suppose to get a call back on Thursday.

## 2019-08-06 ENCOUNTER — HOSPITAL ENCOUNTER (OUTPATIENT)
Dept: PHYSICAL THERAPY | Age: 74
Discharge: HOME | End: 2019-08-06
Payer: MEDICARE

## 2019-08-06 PROCEDURE — 97110 THERAPEUTIC EXERCISES: CPT | Performed by: PHYSICAL THERAPIST

## 2019-08-06 RX ORDER — LEVOTHYROXINE SODIUM 50 UG/1
TABLET ORAL
Qty: 90 TAB | Refills: 1 | Status: SHIPPED | OUTPATIENT
Start: 2019-08-06 | End: 2020-01-23 | Stop reason: SDUPTHER

## 2019-08-06 NOTE — TELEPHONE ENCOUNTER
Message routed to Bobby Pittman last week and she was on vacation. One of pts kidney functions was slightly elevated so I would like her to make sure she is hydrated and repeat labs in 1 week.  All other labs were ok

## 2019-08-06 NOTE — PROGRESS NOTES
PHYSICAL THERAPY - DAILY TREATMENT NOTE    Patient Name: Shauna Espinosa        Date: 2019  : 1945   YES Patient  Verified  Visit #:     Insurance: Payor: Eliezer Mejias / Plan: [address] [place] [address] HMO / Product Type: Managed Care Medicare /      In time: 11:00 Out time: 11:45   Total Treatment Time: 45     BCBS/Medicare Time Tracking (below)   Total Timed Codes (min):  40 1:1 Treatment Time:  40     TREATMENT AREA =  Other abnormalities of gait and mobility [R26.89]    SUBJECTIVE  Pain Level (on 0 to 10 scale):  0  / 10   Medication Changes/New allergies or changes in medical history, any new surgeries or procedures? NO    If yes, update Summary List   Subjective Functional Status/Changes:  []  No changes reported     Pt reports having some stumbles, but no falls.           Modalities Rationale:   NA   min [] Estim, type/location:                                      []  att     []  unatt     []  w/US     []  w/ice    []  w/heat    min []  Mechanical Traction: type/lbs                   []  pro   []  sup   []  int   []  cont    []  before manual    []  after manual    min []  Ultrasound, settings/location:      min []  Iontophoresis w/ dexamethasone, location:                                               []  take home patch       []  in clinic    min []  Ice     []  Heat    location/position:     min []  Vasopneumatic Device, press/temp:     min []  Other:    [] Skin assessment post-treatment (if applicable):    []  intact    []  redness- no adverse reaction     []redness - adverse reaction:        45(40) min Therapeutic Exercise:  []  See flow sheet   Rationale:        Billed With/As:   [] TE   [] TA   [] Neuro   [] Self Care Patient Education: [x] Review HEP    [] Progressed/Changed HEP based on:   [] positioning   [] body mechanics   [] transfers   [] heat/ice application    [] other:      Other Objective/Functional Measures:    Pt showing improved balance with static standing exercises, but she requires some assist during stepping over hurdles. Post Treatment Pain Level (on 0 to 10) scale:   0  / 10     ASSESSMENT  Assessment/Changes in Function:     Pt fatigued quickly with sit<->stand exercise, but lost form before he looked. She rises form chair more easily when she does not think about it. []  See Progress Note/Recertification   Patient will continue to benefit from skilled PT services to modify and progress therapeutic interventions, address functional mobility deficits, address ROM deficits, address strength deficits, analyze and address soft tissue restrictions, analyze and cue movement patterns, analyze and modify body mechanics/ergonomics, assess and modify postural abnormalities and address imbalance/dizziness to attain remaining goals. Progress toward goals / Updated goals:    Pt is progressing slowly with regaining balance.        PLAN  [x]  Upgrade activities as tolerated YES Continue plan of care   []  Discharge due to :    []  Other:      Therapist: Marilynn Bahena PT    Date: 8/6/2019 Time: 10:56 AM     Future Appointments   Date Time Provider Gui Woodward   8/6/2019 11:00 AM Cooper Ahmadi, [place] [facility] [place]   8/9/2019  9:00 AM Lyla Campos, [place] [facility] [place]   8/13/2019  1:00 PM Cooper Ahmadi, [place] [facility] [place]   8/15/2019 12:00 PM Geno Dunbar, [place] [facility] [place]

## 2019-08-06 NOTE — TELEPHONE ENCOUNTER
Pt made aware of results. She asked about staying on iron which she will. She needs her thyroid med called in. Creatinine elevation is not something new, she was not concerned.

## 2019-08-09 ENCOUNTER — HOSPITAL ENCOUNTER (OUTPATIENT)
Dept: PHYSICAL THERAPY | Age: 74
Discharge: HOME | End: 2019-08-09
Payer: MEDICARE

## 2019-08-09 PROCEDURE — 97110 THERAPEUTIC EXERCISES: CPT

## 2019-08-09 NOTE — PROGRESS NOTES
PHYSICAL THERAPY - DAILY TREATMENT NOTE    Patient Name: Kwesi Espinoza        Date: 2019  : 1945   YES Patient  Verified  Visit #:   15   of   15  Insurance: Payor: Garrison Dormantony / Plan: [address] [place] [address] HMO / Product Type: Managed Care Medicare /      In time: 900 Out time: 950   Total Treatment Time: 45     BCBS/Medicare Time Tracking (below)   Total Timed Codes (min):  45 1:1 Treatment Time:  30     TREATMENT AREA =  Other abnormalities of gait and mobility [R26.89]    SUBJECTIVE  Pain Level (on 0 to 10 scale):  0  / 10   Medication Changes/New allergies or changes in medical history, any new surgeries or procedures? NO    If yes, update Summary List   Subjective Functional Status/Changes:  []  No changes reported     I still have trouble getting out of the recliner in my room.  I have to use both hands and feel dizzy when I stand up        Modalities Rationale:     decrease inflammation, decrease pain and increase tissue extensibility to improve patient's ability to perform ADLs   min [] Estim, type/location:                                      []  att     []  unatt     []  w/US     []  w/ice    []  w/heat    min []  Mechanical Traction: type/lbs                   []  pro   []  sup   []  int   []  cont    []  before manual    []  after manual    min []  Ultrasound, settings/location:      min []  Iontophoresis w/ dexamethasone, location:                                               []  take home patch       []  in clinic    min []  Ice     []  Heat    location/position:     min []  Vasopneumatic Device, press/temp:     min []  Other:    [x] Skin assessment post-treatment (if applicable):    [x]  intact    [x]  redness- no adverse reaction     []redness - adverse reaction:        45/30 min Therapeutic Exercise:  [x]  See flow sheet   Rationale:      increase ROM and increase strength to improve the patients ability to perform transfers, bed mobility and prolonged walking     Billed With/As:   [x] TE   [] TA   [] Neuro   [] Self Care Patient Education: [x] Review HEP    [] Progressed/Changed HEP based on:   [] positioning   [] body mechanics   [] transfers   [] heat/ice application    [] other:      Other Objective/Functional Measures:    TE per FS     Post Treatment Pain Level (on 0 to 10) scale:   0  / 10     ASSESSMENT  Assessment/Changes in Function:     Requires frequent cues to stand full erect and minimize forward stooping posture. Requires cueing to scoot to edge of seat during sit > stand and to maintain forward not downward gaze (no c/o dizziness and improved stability/balance when performing with proper mechanics)     []  See Progress Note/Recertification   Patient will continue to benefit from skilled PT services to modify and progress therapeutic interventions, address functional mobility deficits, address ROM deficits, address strength deficits, analyze and address soft tissue restrictions, analyze and cue movement patterns, analyze and modify body mechanics/ergonomics, assess and modify postural abnormalities, address imbalance/dizziness and instruct in home and community integration to attain remaining goals.    Progress toward goals / Updated goals:    Progressing towards [address]  [x]  Upgrade activities as tolerated YES Continue plan of care   []  Discharge due to :    [x]  Other: Plan to DC next week     Therapist: Ty Jameson, PT, DPT, MTC, CMTPT    Date: 8/9/2019 Time: 7:21 PM     Future Appointments   Date Time Provider Gui Woodward   8/13/2019  1:00 PM [place] [facility] [place]   8/15/2019 12:00 PM Ivan Landrum [place] [facility] [place]

## 2019-08-13 ENCOUNTER — HOSPITAL ENCOUNTER (OUTPATIENT)
Dept: PHYSICAL THERAPY | Age: 74
Discharge: HOME | End: 2019-08-13
Payer: MEDICARE

## 2019-08-13 PROCEDURE — 97110 THERAPEUTIC EXERCISES: CPT | Performed by: PHYSICAL THERAPIST

## 2019-08-13 NOTE — PROGRESS NOTES
PHYSICAL THERAPY - DAILY TREATMENT NOTE    Patient Name: Usha Doss        Date: 2019  : 1945   YES Patient  Verified  Visit #:     Insurance: Payor: Qing Enamorado / Plan: [address] [place] [address] HMO / Product Type: Managed Care Medicare /      In time: 1:00 Out time: 1:50   Total Treatment Time: 40     BCBS/Medicare Time Tracking (below)   Total Timed Codes (min):  25 1:1 Treatment Time:  25     TREATMENT AREA =  Other abnormalities of gait and mobility [R26.89]    SUBJECTIVE  Pain Level (on 0 to 10 scale):  0  / 10   Medication Changes/New allergies or changes in medical history, any new surgeries or procedures? NO    If yes, update Summary List   Subjective Functional Status/Changes:  []  No changes reported     Pt reports having an easier time getting up form chairs when she does not think about it. She has been told by family that she is transferring better. No falls or stumbles.           Modalities Rationale:   NA   min [] Estim, type/location:                                      []  att     []  unatt     []  w/US     []  w/ice    []  w/heat    min []  Mechanical Traction: type/lbs                   []  pro   []  sup   []  int   []  cont    []  before manual    []  after manual    min []  Ultrasound, settings/location:      min []  Iontophoresis w/ dexamethasone, location:                                               []  take home patch       []  in clinic    min []  Ice     []  Heat    location/position:     min []  Vasopneumatic Device, press/temp:     min []  Other:    [] Skin assessment post-treatment (if applicable):    []  intact    []  redness- no adverse reaction     []redness - adverse reaction:        40(25) min Therapeutic Exercise:  []  See flow sheet   Rationale:        Billed With/As:   [] TE   [] TA   [] Neuro   [] Self Care Patient Education: [x] Review HEP    [] Progressed/Changed HEP based on:   [] positioning   [] body mechanics   [] transfers   [] heat/ice application    [] other:      Other Objective/Functional Measures:    Pt required VCs to fully extend knee during LAQ while sitting     Post Treatment Pain Level (on 0 to 10) scale:   0  / 10     ASSESSMENT  Assessment/Changes in Function:     Pt tried to perform sit<->stand at end of session, and was able to perform 6 after some forward/back weight shifting to get momentum to rise. She required VCs to fully extend knees at terminal rise during sit->stand, and to control descent when returning to sit. []  See Progress Note/Recertification   Patient will continue to benefit from skilled PT services to modify and progress therapeutic interventions, address functional mobility deficits, address ROM deficits, address strength deficits, analyze and address soft tissue restrictions, analyze and cue movement patterns, analyze and modify body mechanics/ergonomics, assess and modify postural abnormalities and address imbalance/dizziness to attain remaining goals.    Progress toward goals / Updated goals:    Pt is progressing slowly with regaining independent and stability with transfers       PLAN  [x]  Upgrade activities as tolerated YES Continue plan of care   []  Discharge due to :    []  Other:      Therapist: Vini Melo PT    Date: 8/13/2019 Time: 10:56 AM     Future Appointments   Date Time Provider Gui Woodward   8/15/2019 12:00 PM Facundo Winslow, [place] [place]

## 2019-08-15 ENCOUNTER — HOSPITAL ENCOUNTER (OUTPATIENT)
Dept: PHYSICAL THERAPY | Age: 74
Discharge: HOME | End: 2019-08-15
Payer: MEDICARE

## 2019-08-15 PROCEDURE — 97110 THERAPEUTIC EXERCISES: CPT | Performed by: PHYSICAL THERAPIST

## 2019-08-15 NOTE — PROGRESS NOTES
[address] [place] [address] PHYSICAL THERAPY AT [address] [place] [address]  [address], [address] ,[address], [address]  Phone: [phone]  Fax: [phone] FOR PHYSICAL THERAPY          Patient Name: Joann Rivera : 1945   Treatment/Medical Diagnosis: Other abnormalities of gait and mobility [R26.89]   Onset Date: May 2019    Referral Source: Cherie Reed, * Start of Care [facility]): 2019   Prior Hospitalization: See Medical History Provider #: [id number]   Prior Level of Function: Pt lived alone until one year ago. She now lives with family due to multiple medical issues and h/o falls. Comorbidities: Peripheral neuropathy, tremors, dementia, depression, B knee OA, thyroid dysfunction, h/o lumbar fusion with residual back pain, h/o B TKA   Medications: Verified on Patient Summary List   Visits from [facility]: 15 Missed Visits: 0     Goal/Measure of Progress Goal Met? 1. Patient to be independent & compliant with a progressive, high level HEP in order to maintain gains made in physical therapy. Status at last Eval: Partial Current Status: Met yes   2. Pt able to sit<->stand form chair 5 times without using UEs to show improved safety with transfers   Status at last Eval: - Current Status: Met yes   3. Pt to reports no falls for 6 weeks. Status at last Eval: No falls Current Status: No falls yes     Key Functional Changes/Progress: Pt was last seen on 8/15/19, and denied having any pain. She denies having any falls. Pt's dtr-in-law notes that she continues to get up from chairs when not in her home environment well. She seems to be better when she does not focus on the process. Pt requires several trials to rise, but then can perform sit<->stand for 5 reps without UE assist, before fatiguing.   Ms José Miguel Hillman has been showing improved balance, but still moves impulsively at times putting her in difficult positions near the edges of her cone of stability and has been cautioned at length to move slowly to increase stability. Pt/dtr-in-law are independent with written HEP and will be discharged from PT with goals partially achieved. Assessments/Recommendations: Discontinue therapy. Progressing towards or have reached established goals. If you have any questions/comments please contact us directly at [phone]. Thank you for allowing us to assist in the care of your patient. Therapist Signature:  Corwin Macdonald PT Date: 8/15/19   Reporting Period: 7/26/19 - 8/15/19 Time: 3:41 PM

## 2019-08-15 NOTE — PROGRESS NOTES
PHYSICAL THERAPY - DAILY TREATMENT NOTE    Patient Name: Pankaj Lehman        Date: 8/15/2019  : 1945   YES Patient  Verified  Visit #:   15     Insurance: Payor: Yamileth Lucrecia / Plan: [address] [place] [address] HMO / Product Type: Managed Care Medicare /      In time: 12:00 Out time: 12:45   Total Treatment Time: 45     BCBS/Medicare Time Tracking (below)   Total Timed Codes (min):  25 1:1 Treatment Time:  25     TREATMENT AREA =  Other abnormalities of gait and mobility [R26.89]    SUBJECTIVE  Pain Level (on 0 to 10 scale):  0  / 10   Medication Changes/New allergies or changes in medical history, any new surgeries or procedures? NO    If yes, update Summary List   Subjective Functional Status/Changes:  []  No changes reported     Pt reports having less difficulty with getting up form chair, but family notes she is thinking about it less and getting up with ease more frequently.           Modalities Rationale:   NA   min [] Estim, type/location:                                      []  att     []  unatt     []  w/US     []  w/ice    []  w/heat    min []  Mechanical Traction: type/lbs                   []  pro   []  sup   []  int   []  cont    []  before manual    []  after manual    min []  Ultrasound, settings/location:      min []  Iontophoresis w/ dexamethasone, location:                                               []  take home patch       []  in clinic    min []  Ice     []  Heat    location/position:     min []  Vasopneumatic Device, press/temp:     min []  Other:    [] Skin assessment post-treatment (if applicable):    []  intact    []  redness- no adverse reaction     []redness - adverse reaction:        45(25) min Therapeutic Exercise:  []  See flow sheet   Rationale:        Billed With/As:   [] TE   [] TA   [] Neuro   [] Self Care Patient Education: [x] Review HEP    [] Progressed/Changed HEP based on:   [] positioning   [] body mechanics   [] transfers   [] heat/ice application    [] other: Other Objective/Functional Measures:    FOTO - 40    Pt requires VCs to avoid moving quickly/impulsively which leads to LOB     Post Treatment Pain Level (on 0 to 10) scale:   0  / 10     ASSESSMENT  Assessment/Changes in Function:     Pt has improved balance when she moves slowly and ensures stability before moving. Pt has plateaued with her progress toward goals and it was mutually decided to DC w HEP. [x]  See DC Note      Progress toward goals / Updated goals:    Pt showing improved functional movements/stability as witnessed by family, but she still has tendency to move impulsively, especially when she feels stressed.   Will DC w HEP (see DC note)        PLAN  []  Upgrade activities as tolerated    [x]  Discharge due to : I w HEP   []  Other:      Therapist: Antonio Pat PT    Date: 8/15/2019 Time: 10:56 AM     Future Appointments   Date Time Provider Gui Woodward   8/15/2019 12:00 PM Elliott Sandoval, PT [facility] [place]

## 2019-09-27 ENCOUNTER — OFFICE VISIT (OUTPATIENT)
Dept: INTERNAL MEDICINE CLINIC | Age: 74
End: 2019-09-27

## 2019-09-27 VITALS
SYSTOLIC BLOOD PRESSURE: 106 MMHG | OXYGEN SATURATION: 98 % | DIASTOLIC BLOOD PRESSURE: 65 MMHG | TEMPERATURE: 97.8 F | BODY MASS INDEX: 31.79 KG/M2 | HEIGHT: 67 IN | RESPIRATION RATE: 16 BRPM | HEART RATE: 67 BPM

## 2019-09-27 DIAGNOSIS — R82.998 LEUKOCYTES IN URINE: ICD-10-CM

## 2019-09-27 DIAGNOSIS — S99.922A INJURY OF TOE ON LEFT FOOT, INITIAL ENCOUNTER: ICD-10-CM

## 2019-09-27 DIAGNOSIS — R35.0 URINARY FREQUENCY: Primary | ICD-10-CM

## 2019-09-27 LAB
BILIRUB UR QL STRIP: ABNORMAL
GLUCOSE UR-MCNC: NEGATIVE MG/DL
KETONES P FAST UR STRIP-MCNC: ABNORMAL MG/DL
PH UR STRIP: 8.5 [PH] (ref 4.6–8)
PROT UR QL STRIP: ABNORMAL
SP GR UR STRIP: 1.01 (ref 1–1.03)
UA UROBILINOGEN AMB POC: ABNORMAL (ref 0.2–1)
URINALYSIS CLARITY POC: ABNORMAL
URINALYSIS COLOR POC: YELLOW
URINE BLOOD POC: ABNORMAL
URINE LEUKOCYTES POC: ABNORMAL
URINE NITRITES POC: NEGATIVE

## 2019-09-27 RX ORDER — PHENAZOPYRIDINE HYDROCHLORIDE 100 MG/1
100 TABLET, FILM COATED ORAL
Qty: 9 TAB | Refills: 0 | Status: SHIPPED | OUTPATIENT
Start: 2019-09-27 | End: 2019-09-30

## 2019-09-27 RX ORDER — VITAMIN E CAP 100 UNIT 100 UNIT
CAP ORAL DAILY
COMMUNITY
End: 2021-11-01

## 2019-09-27 RX ORDER — NITROFURANTOIN 25; 75 MG/1; MG/1
100 CAPSULE ORAL 2 TIMES DAILY
Qty: 6 CAP | Refills: 0 | Status: SHIPPED | OUTPATIENT
Start: 2019-09-27 | End: 2019-10-17 | Stop reason: ALTCHOICE

## 2019-09-27 NOTE — PATIENT INSTRUCTIONS
Urine Culture: About This Test  What is it? A urine culture is a test to find germs (such as bacteria) that can cause an infection. A sample of urine is added to a substance that promotes the growth of germs. If no germs grow, the culture is negative. If germs that can cause infection grow, the culture is positive. The type of germ may be identified using a microscope or chemical tests. Why is this test done? A urine culture may be done to:  · Find out if symptoms like pain or burning when urinating are from a urinary tract infection (UTI). · Find the cause of a UTI. · Make decisions about the best treatment for a UTI. · Find out whether treatment for a UTI worked. How can you prepare for the test?  You don't need to do anything before you have the test. If you are taking or have recently taken antibiotics, tell your doctor. What happens before the test?  You will need to drink enough fluids and avoid urinating so that you will be able to collect a urine sample. What happens during the test?  You will be asked to collect a clean-catch midstream urine sample for testing. The first urine of the day is best because bacteria levels will be higher. · Wash your hands before collecting the urine. · If the container has a lid, remove the lid of the container and set it down with the inner surface up. · Clean the area around your penis or vagina. · Begin urinating into the toilet or urinal.  · After the urine has flowed for several seconds, place the collection container in the stream and collect about 2 ounces (a quarter cup) of this \"midstream\" urine without stopping the flow. · Don't touch the rim of the container to your genital area. · Finish urinating into the toilet or urinal.  · Carefully replace the lid on the container. · Wash your hands. How long does the test take? The test will take a few minutes. What happens after the test?  · You will probably be able to go home right away.  The results of a urine culture are usually available in 1 to 3 days. · You can go back to your usual activities right away. Follow-up care is a key part of your treatment and safety. Be sure to make and go to all appointments, and call your doctor if you are having problems. It's also a good idea to keep a list of the medicines you take. Ask your doctor when you can expect to have your test results. Where can you learn more? Go to http://juan alberto-marina.info/. Enter R756 in the search box to learn more about \"Urine Culture: About This Test.\"  Current as of: March 28, 2019  Content Version: 12.2  © 3809-8430 [employer]. Care instructions adapted under license by [employer] (which disclaims liability or warranty for this information). If you have questions about a medical condition or this instruction, always ask your healthcare professional. [address] any warranty or liability for your use of this information.

## 2019-09-27 NOTE — PROGRESS NOTES
HISTORY OF PRESENT ILLNESS  Regis Hwang is a 69 y.o. female. HPI Ms. Sylwia Franco is here for c/o urinary frequency and burning. She is having urgency and dysuria. She has noticed a change in the odor of her urine for some time now. She does have decreased kidney function and saw a nephrologist about a year ago in [place] before moving here. She states her GFR and Creatinine are around where they had been. Suggested she may want to establish with a nephrologist here. She is also c/o left 2nd-4th toe pain. She states she keeps bumping her toes and she is concerned she may have broken them. Review of Systems   Constitutional: Negative. HENT: Negative. Eyes: Negative. Respiratory: Negative. Cardiovascular: Negative. Gastrointestinal: Negative. Genitourinary: Positive for dysuria, frequency and urgency. Musculoskeletal: Positive for joint pain (left 2nd-4th toes). Neurological: Negative for dizziness. Physical Exam   Constitutional: She is oriented to person, place, and time. She appears well-developed and well-nourished. No distress. HENT:   Head: Normocephalic and atraumatic. Eyes: Conjunctivae are normal.   Cardiovascular: Normal rate. Pulmonary/Chest: Effort normal.   Musculoskeletal:        Feet:    Neurological: She is alert and oriented to person, place, and time. Psychiatric: She has a normal mood and affect. Her behavior is normal. Judgment and thought content normal.     Visit Vitals  /65 (BP 1 Location: Left arm, BP Patient Position: Sitting)   Pulse 67   Temp 97.8 °F (36.6 °C) (Oral)   Resp 16   Ht 5' 7\" (1.702 m)   SpO2 98%   BMI 31.79 kg/m²       ASSESSMENT and PLAN    ICD-10-CM ICD-9-CM    1. Urinary frequency R35.0 788.41 AMB POC URINALYSIS DIP STICK AUTO W/O MICRO      CULTURE, URINE      URINALYSIS W/ RFLX MICROSCOPIC   2.  Injury of toe on left foot, initial encounter S99.922A 959.7 XR FOOT LT MIN 3 V   Pt verbalized understanding of their condition and diagnoses, treatment plan,  as well as side effects of any new medications prescribed.

## 2019-09-27 NOTE — PROGRESS NOTES
Chief Complaint   Patient presents with    Urinary Frequency    Urinary Burning     1. Have you been to the ER, urgent care clinic since your last visit? Hospitalized since your last visit? No    2. Have you seen or consulted any other health care providers outside of the [address] [place] [address] since your last visit? Include any pap smears or colon screening.  No

## 2019-09-30 LAB
APPEARANCE UR: ABNORMAL
BACTERIA #/AREA URNS HPF: ABNORMAL /[HPF]
BACTERIA UR CULT: ABNORMAL
BACTERIA UR CULT: ABNORMAL
BILIRUB UR QL STRIP: NEGATIVE
CASTS URNS QL MICRO: ABNORMAL /LPF
COLOR UR: YELLOW
CRYSTALS URNS MICRO: ABNORMAL
EPI CELLS #/AREA URNS HPF: ABNORMAL /HPF (ref 0–10)
GLUCOSE UR QL: ABNORMAL
HGB UR QL STRIP: NEGATIVE
KETONES UR QL STRIP: NEGATIVE
LEUKOCYTE ESTERASE UR QL STRIP: ABNORMAL
MICRO URNS: ABNORMAL
MUCOUS THREADS URNS QL MICRO: PRESENT
NITRITE UR QL STRIP: NEGATIVE
PH UR STRIP: =>9 [PH] (ref 5–7.5)
PROT UR QL STRIP: ABNORMAL
RBC #/AREA URNS HPF: ABNORMAL /HPF (ref 0–2)
SP GR UR: 1.03 (ref 1–1.03)
UNIDENT CRYS URNS QL MICRO: PRESENT
UROBILINOGEN UR STRIP-MCNC: 1 MG/DL (ref 0.2–1)
WBC #/AREA URNS HPF: >30 /HPF (ref 0–5)

## 2019-09-30 RX ORDER — CEFUROXIME AXETIL 500 MG/1
250 TABLET ORAL 2 TIMES DAILY
Qty: 7 TAB | Refills: 0 | Status: SHIPPED | OUTPATIENT
Start: 2019-09-30 | End: 2019-10-07

## 2019-10-17 ENCOUNTER — OFFICE VISIT (OUTPATIENT)
Dept: INTERNAL MEDICINE CLINIC | Age: 74
End: 2019-10-17

## 2019-10-17 ENCOUNTER — HOSPITAL ENCOUNTER (OUTPATIENT)
Dept: LAB | Age: 74
Discharge: HOME | End: 2019-10-17
Payer: MEDICARE

## 2019-10-17 VITALS
OXYGEN SATURATION: 97 % | HEART RATE: 60 BPM | DIASTOLIC BLOOD PRESSURE: 82 MMHG | RESPIRATION RATE: 16 BRPM | SYSTOLIC BLOOD PRESSURE: 146 MMHG | HEIGHT: 67 IN | WEIGHT: 189 LBS | BODY MASS INDEX: 29.66 KG/M2 | TEMPERATURE: 97.6 F

## 2019-10-17 DIAGNOSIS — N30.00 ACUTE CYSTITIS WITHOUT HEMATURIA: Primary | ICD-10-CM

## 2019-10-17 DIAGNOSIS — N30.00 ACUTE CYSTITIS WITHOUT HEMATURIA: ICD-10-CM

## 2019-10-17 PROBLEM — N18.30 CKD (CHRONIC KIDNEY DISEASE) STAGE 3, GFR 30-59 ML/MIN (HCC): Status: ACTIVE | Noted: 2019-10-17

## 2019-10-17 PROCEDURE — 87086 URINE CULTURE/COLONY COUNT: CPT

## 2019-10-17 PROCEDURE — 87186 SC STD MICRODIL/AGAR DIL: CPT

## 2019-10-17 PROCEDURE — 87077 CULTURE AEROBIC IDENTIFY: CPT

## 2019-10-17 RX ORDER — CIPROFLOXACIN 250 MG/1
250 TABLET, FILM COATED ORAL EVERY 12 HOURS
Qty: 10 TAB | Refills: 0 | Status: SHIPPED | OUTPATIENT
Start: 2019-10-17 | End: 2019-10-22

## 2019-10-19 LAB
BACTERIA SPEC CULT: ABNORMAL
SERVICE CMNT-IMP: ABNORMAL

## 2019-10-21 NOTE — PATIENT INSTRUCTIONS
Urinary Tract Infection in Women: Care Instructions  Your Care Instructions    A urinary tract infection, or UTI, is a general term for an infection anywhere between the kidneys and the urethra (where urine comes out). Most UTIs are bladder infections. They often cause pain or burning when you urinate. UTIs are caused by bacteria and can be cured with antibiotics. Be sure to complete your treatment so that the infection goes away. Follow-up care is a key part of your treatment and safety. Be sure to make and go to all appointments, and call your doctor if you are having problems. It's also a good idea to know your test results and keep a list of the medicines you take. How can you care for yourself at home? · Take your antibiotics as directed. Do not stop taking them just because you feel better. You need to take the full course of antibiotics. · Drink extra water and other fluids for the next day or two. This may help wash out the bacteria that are causing the infection. (If you have kidney, heart, or liver disease and have to limit fluids, talk with your doctor before you increase your fluid intake.)  · Avoid drinks that are carbonated or have caffeine. They can irritate the bladder. · Urinate often. Try to empty your bladder each time. · To relieve pain, take a hot bath or lay a heating pad set on low over your lower belly or genital area. Never go to sleep with a heating pad in place. To prevent UTIs  · Drink plenty of water each day. This helps you urinate often, which clears bacteria from your system. (If you have kidney, heart, or liver disease and have to limit fluids, talk with your doctor before you increase your fluid intake.)  · Urinate when you need to. · Urinate right after you have sex. · Change sanitary pads often. · Avoid douches, bubble baths, feminine hygiene sprays, and other feminine hygiene products that have deodorants.   · After going to the bathroom, wipe from front to back.  When should you call for help? Call your doctor now or seek immediate medical care if:    · Symptoms such as fever, chills, nausea, or vomiting get worse or appear for the first time.     · You have new pain in your back just below your rib cage. This is called flank pain.     · There is new blood or pus in your urine.     · You have any problems with your antibiotic medicine.    Watch closely for changes in your health, and be sure to contact your doctor if:    · You are not getting better after taking an antibiotic for 2 days.     · Your symptoms go away but then come back. Where can you learn more? Go to http://juan alberto-marina.info/. Enter W294 in the search box to learn more about \"Urinary Tract Infection in Women: Care Instructions. \"  Current as of: December 19, 2018  Content Version: 12.2  © 0907-0300 [employer]. Care instructions adapted under license by [employer] (which disclaims liability or warranty for this information). If you have questions about a medical condition or this instruction, always ask your healthcare professional. [address] any warranty or liability for your use of this information.

## 2019-10-21 NOTE — PROGRESS NOTES
HISTORY OF PRESENT ILLNESS  Vel Ogden is a 69 y.o. female. HPI   Pt is here for f/u from UTI. She finished her abx a few days ago but does not feel like her UTI is gone. She is still having frequency and dysuria. Denies abd/pelvic pain, fever, chills, N/V/D, and hematuria    Allergies   Allergen Reactions    Erythromycin Rash       Current Outpatient Medications   Medication Sig    ciprofloxacin HCl (CIPRO) 250 mg tablet Take 1 Tab by mouth every twelve (12) hours for 5 days.  vitamin e (E GEMS) 100 unit capsule Take  by mouth daily.  levothyroxine (SYNTHROID) 50 mcg tablet TAKE 1 TABLET BY MOUTH ONCE DAILY BEFORE BREAKFAST    omeprazole (PRILOSEC) 20 mg capsule Take 1 Cap by mouth daily.  rosuvastatin (CRESTOR) 40 mg tablet Take 1 Tab by mouth nightly.  sucralfate (CARAFATE) 1 gram tablet Take 1 Tab by mouth four (4) times daily.  furosemide (LASIX) 20 mg tablet Take 1 Tab by mouth daily.  rOPINIRole (REQUIP) 0.5 mg tablet Take  by mouth three (3) times daily.  ferrous sulfate (IRON) 325 mg (65 mg iron) cpER Take  by mouth.  potassium chloride (KLOR-CON) 10 mEq tablet Take 1 Tab by mouth daily.  valbenazine (INGREZZA) 80 mg cap Take  by mouth.  magnesium oxide (MAG-OX) 400 mg tablet Take 400 mg by mouth.  lamoTRIgine (LAMICTAL) 200 mg tablet Take 200 mg by mouth two (2) times a day.  donepezil (ARICEPT) 10 mg tablet Take 10 mg by mouth nightly.  propranolol (INDERAL) 60 mg tablet Take 10 mg by mouth two (2) times a day.  lurasidone (LATUDA) 60 mg tab tablet Take 80 mg by mouth.  doxepin (SINEQUAN) 25 mg capsule Take  by mouth nightly.  hydrOXYzine pamoate (VISTARIL) 25 mg capsule Take 25 mg by mouth nightly.  cyanocobalamin (VITAMIN B12) 1,000 mcg/mL injection 1 mL by IntraMUSCular route every fourteen (14) days.  Omeprazole delayed release (PRILOSEC D/R) 20 mg tablet Take 1 Tab by mouth daily.     clotrimazole-betamethasone (LOTRISONE) topical cream Apply to rash bid    guaiFENesin (ROBITUSSIN) 100 mg/5 mL liquid Take 200 mg by mouth. No current facility-administered medications for this visit. Review of Systems   Constitutional: Negative. Negative for chills, fever and malaise/fatigue. HENT: Negative. Eyes: Negative. Respiratory: Negative. Negative for cough, shortness of breath and wheezing. Cardiovascular: Negative. Negative for chest pain, palpitations and leg swelling. Gastrointestinal: Negative. Negative for abdominal pain, nausea and vomiting. Genitourinary: Positive for dysuria and frequency. Negative for hematuria and urgency. Skin: Negative. Negative for itching and rash. Neurological: Negative for dizziness. Visit Vitals  /82 (BP 1 Location: Right arm, BP Patient Position: Sitting)   Pulse 60   Temp 97.6 °F (36.4 °C) (Oral)   Resp 16   Ht 5' 7\" (1.702 m)   Wt 189 lb (85.7 kg)   SpO2 97%   BMI 29.60 kg/m²       Physical Exam   Constitutional: She is oriented to person, place, and time. She appears well-developed and well-nourished. No distress. HENT:   Head: Normocephalic and atraumatic. Cardiovascular: Normal rate and regular rhythm. Pulmonary/Chest: Effort normal and breath sounds normal.   Abdominal: Soft. There is no tenderness. Neurological: She is alert and oriented to person, place, and time. Skin: Skin is warm and dry. She is not diaphoretic. Psychiatric: She has a normal mood and affect. Her behavior is normal.       ASSESSMENT and PLAN    ICD-10-CM ICD-9-CM    1. Acute cystitis without hematuria N30.00 595.0 ciprofloxacin HCl (CIPRO) 250 mg tablet      CULTURE, URINE     Follow-up and Dispositions    · Return if symptoms worsen or fail to improve. Pt expressed understanding of visit summary and plans for any follow ups or referrals as well as any medications prescribed.

## 2019-10-24 ENCOUNTER — HOSPITAL ENCOUNTER (OUTPATIENT)
Dept: LAB | Age: 74
Discharge: HOME | End: 2019-10-24
Payer: MEDICARE

## 2019-10-24 ENCOUNTER — TELEPHONE (OUTPATIENT)
Dept: INTERNAL MEDICINE CLINIC | Age: 74
End: 2019-10-24

## 2019-10-24 ENCOUNTER — OFFICE VISIT (OUTPATIENT)
Dept: INTERNAL MEDICINE CLINIC | Age: 74
End: 2019-10-24

## 2019-10-24 DIAGNOSIS — N30.00 ACUTE CYSTITIS WITHOUT HEMATURIA: Primary | ICD-10-CM

## 2019-10-24 DIAGNOSIS — Z78.0 POST-MENOPAUSAL: ICD-10-CM

## 2019-10-24 DIAGNOSIS — N30.00 ACUTE CYSTITIS WITHOUT HEMATURIA: ICD-10-CM

## 2019-10-24 DIAGNOSIS — Z13.820 OSTEOPOROSIS SCREENING: Primary | ICD-10-CM

## 2019-10-24 PROCEDURE — 87086 URINE CULTURE/COLONY COUNT: CPT

## 2019-10-24 NOTE — TELEPHONE ENCOUNTER
Pt came in today to leave a urine for follow up culture. Asking about a Bone Density test. She has never had one but told she has Osteoporosis in her foot. If you think she needs one can you order it?

## 2019-10-25 ENCOUNTER — DOCUMENTATION ONLY (OUTPATIENT)
Dept: INTERNAL MEDICINE CLINIC | Age: 74
End: 2019-10-25

## 2019-10-26 LAB
BACTERIA SPEC CULT: NORMAL
SERVICE CMNT-IMP: NORMAL

## 2019-11-04 NOTE — PROGRESS NOTES
HISTORY OF PRESENT ILLNESS  Lenard Lopez is a 69 y.o. female. HPI   Urine left for cx/follow up    Allergies   Allergen Reactions    Erythromycin Rash       Current Outpatient Medications   Medication Sig    vitamin e (E GEMS) 100 unit capsule Take  by mouth daily.  levothyroxine (SYNTHROID) 50 mcg tablet TAKE 1 TABLET BY MOUTH ONCE DAILY BEFORE BREAKFAST    cyanocobalamin (VITAMIN B12) 1,000 mcg/mL injection 1 mL by IntraMUSCular route every fourteen (14) days.  omeprazole (PRILOSEC) 20 mg capsule Take 1 Cap by mouth daily.  rosuvastatin (CRESTOR) 40 mg tablet Take 1 Tab by mouth nightly.  sucralfate (CARAFATE) 1 gram tablet Take 1 Tab by mouth four (4) times daily.  furosemide (LASIX) 20 mg tablet Take 1 Tab by mouth daily.  Omeprazole delayed release (PRILOSEC D/R) 20 mg tablet Take 1 Tab by mouth daily.  rOPINIRole (REQUIP) 0.5 mg tablet Take  by mouth three (3) times daily.  ferrous sulfate (IRON) 325 mg (65 mg iron) cpER Take  by mouth.  potassium chloride (KLOR-CON) 10 mEq tablet Take 1 Tab by mouth daily.  clotrimazole-betamethasone (LOTRISONE) topical cream Apply to rash bid    valbenazine (INGREZZA) 80 mg cap Take  by mouth.  guaiFENesin (ROBITUSSIN) 100 mg/5 mL liquid Take 200 mg by mouth.  magnesium oxide (MAG-OX) 400 mg tablet Take 400 mg by mouth.  lamoTRIgine (LAMICTAL) 200 mg tablet Take 200 mg by mouth two (2) times a day.  donepezil (ARICEPT) 10 mg tablet Take 10 mg by mouth nightly.  propranolol (INDERAL) 60 mg tablet Take 10 mg by mouth two (2) times a day.  lurasidone (LATUDA) 60 mg tab tablet Take 80 mg by mouth.  doxepin (SINEQUAN) 25 mg capsule Take  by mouth nightly.  hydrOXYzine pamoate (VISTARIL) 25 mg capsule Take 25 mg by mouth nightly. No current facility-administered medications for this visit. ROS  There were no vitals taken for this visit. Physical Exam    ASSESSMENT and PLAN    ICD-10-CM ICD-9-CM    1.  Acute cystitis without hematuria N30.00 595.0 CULTURE, URINE     Follow-up and Dispositions    · Return if symptoms worsen or fail to improve. Pt expressed understanding of visit summary and plans for any follow ups or referrals as well as any medications prescribed.

## 2019-11-04 NOTE — PATIENT INSTRUCTIONS
Urinary Tract Infection in Women: Care Instructions  Your Care Instructions    A urinary tract infection, or UTI, is a general term for an infection anywhere between the kidneys and the urethra (where urine comes out). Most UTIs are bladder infections. They often cause pain or burning when you urinate. UTIs are caused by bacteria and can be cured with antibiotics. Be sure to complete your treatment so that the infection goes away. Follow-up care is a key part of your treatment and safety. Be sure to make and go to all appointments, and call your doctor if you are having problems. It's also a good idea to know your test results and keep a list of the medicines you take. How can you care for yourself at home? · Take your antibiotics as directed. Do not stop taking them just because you feel better. You need to take the full course of antibiotics. · Drink extra water and other fluids for the next day or two. This may help wash out the bacteria that are causing the infection. (If you have kidney, heart, or liver disease and have to limit fluids, talk with your doctor before you increase your fluid intake.)  · Avoid drinks that are carbonated or have caffeine. They can irritate the bladder. · Urinate often. Try to empty your bladder each time. · To relieve pain, take a hot bath or lay a heating pad set on low over your lower belly or genital area. Never go to sleep with a heating pad in place. To prevent UTIs  · Drink plenty of water each day. This helps you urinate often, which clears bacteria from your system. (If you have kidney, heart, or liver disease and have to limit fluids, talk with your doctor before you increase your fluid intake.)  · Urinate when you need to. · Urinate right after you have sex. · Change sanitary pads often. · Avoid douches, bubble baths, feminine hygiene sprays, and other feminine hygiene products that have deodorants.   · After going to the bathroom, wipe from front to back.  When should you call for help? Call your doctor now or seek immediate medical care if:    · Symptoms such as fever, chills, nausea, or vomiting get worse or appear for the first time.     · You have new pain in your back just below your rib cage. This is called flank pain.     · There is new blood or pus in your urine.     · You have any problems with your antibiotic medicine.    Watch closely for changes in your health, and be sure to contact your doctor if:    · You are not getting better after taking an antibiotic for 2 days.     · Your symptoms go away but then come back. Where can you learn more? Go to http://juan alberto-marina.info/. Enter W000 in the search box to learn more about \"Urinary Tract Infection in Women: Care Instructions. \"  Current as of: December 19, 2018  Content Version: 12.2  © 1441-3891 [employer]. Care instructions adapted under license by [employer] (which disclaims liability or warranty for this information). If you have questions about a medical condition or this instruction, always ask your healthcare professional. [address] any warranty or liability for your use of this information.

## 2019-11-12 ENCOUNTER — TELEPHONE (OUTPATIENT)
Dept: INTERNAL MEDICINE CLINIC | Age: 74
End: 2019-11-12

## 2019-11-12 DIAGNOSIS — M81.0 AGE-RELATED OSTEOPOROSIS WITHOUT CURRENT PATHOLOGICAL FRACTURE: Primary | ICD-10-CM

## 2019-11-12 NOTE — TELEPHONE ENCOUNTER
Please advise pt her bone density test is consistent w osteoporosis. I will send rx for fosamax and weight bearing exercises can also help.  She should retest bone density in 2 yrs

## 2019-11-13 ENCOUNTER — TELEPHONE (OUTPATIENT)
Dept: INTERNAL MEDICINE CLINIC | Age: 74
End: 2019-11-13

## 2019-11-13 DIAGNOSIS — M81.0 AGE-RELATED OSTEOPOROSIS WITHOUT CURRENT PATHOLOGICAL FRACTURE: Primary | ICD-10-CM

## 2019-11-15 RX ORDER — ALENDRONATE SODIUM 40 MG/1
40 TABLET ORAL
Qty: 12 TAB | Refills: 3 | Status: SHIPPED | OUTPATIENT
Start: 2019-11-15 | End: 2020-01-23 | Stop reason: SDUPTHER

## 2019-12-02 DIAGNOSIS — Z13.820 OSTEOPOROSIS SCREENING: ICD-10-CM

## 2019-12-02 DIAGNOSIS — Z78.0 POST-MENOPAUSAL: ICD-10-CM

## 2019-12-23 ENCOUNTER — TELEPHONE (OUTPATIENT)
Dept: INTERNAL MEDICINE CLINIC | Age: 74
End: 2019-12-23

## 2019-12-23 DIAGNOSIS — K21.9 GASTROESOPHAGEAL REFLUX DISEASE WITHOUT ESOPHAGITIS: Primary | ICD-10-CM

## 2019-12-23 NOTE — TELEPHONE ENCOUNTER
Pt called requesting to be referred to a Gastro doctor because she is having increased GI problems. She does not have anyone in mind just whoever we refer her to. She has [employer] as her ins.

## 2020-01-23 ENCOUNTER — HOSPITAL ENCOUNTER (OUTPATIENT)
Dept: LAB | Age: 75
Discharge: HOME | End: 2020-01-23
Payer: MEDICARE

## 2020-01-23 ENCOUNTER — OFFICE VISIT (OUTPATIENT)
Dept: FAMILY MEDICINE CLINIC | Facility: CLINIC | Age: 75
End: 2020-01-23

## 2020-01-23 VITALS
SYSTOLIC BLOOD PRESSURE: 150 MMHG | OXYGEN SATURATION: 98 % | HEIGHT: 67 IN | RESPIRATION RATE: 16 BRPM | BODY MASS INDEX: 28.72 KG/M2 | HEART RATE: 64 BPM | WEIGHT: 183 LBS | DIASTOLIC BLOOD PRESSURE: 84 MMHG | TEMPERATURE: 97 F

## 2020-01-23 DIAGNOSIS — E53.8 B12 DEFICIENCY: ICD-10-CM

## 2020-01-23 DIAGNOSIS — R60.1 GENERALIZED EDEMA: ICD-10-CM

## 2020-01-23 DIAGNOSIS — K21.9 GASTROESOPHAGEAL REFLUX DISEASE WITHOUT ESOPHAGITIS: ICD-10-CM

## 2020-01-23 DIAGNOSIS — E78.00 HYPERCHOLESTEROLEMIA: ICD-10-CM

## 2020-01-23 DIAGNOSIS — E03.9 ACQUIRED HYPOTHYROIDISM: Primary | ICD-10-CM

## 2020-01-23 DIAGNOSIS — I10 ESSENTIAL HYPERTENSION: ICD-10-CM

## 2020-01-23 DIAGNOSIS — K58.9 IRRITABLE BOWEL SYNDROME, UNSPECIFIED TYPE: ICD-10-CM

## 2020-01-23 DIAGNOSIS — M81.0 AGE-RELATED OSTEOPOROSIS WITHOUT CURRENT PATHOLOGICAL FRACTURE: ICD-10-CM

## 2020-01-23 DIAGNOSIS — Z00.00 MEDICARE ANNUAL WELLNESS VISIT, SUBSEQUENT: ICD-10-CM

## 2020-01-23 DIAGNOSIS — L30.9 DERMATITIS: ICD-10-CM

## 2020-01-23 LAB
ALBUMIN SERPL-MCNC: 3.7 G/DL (ref 3.4–5)
ALBUMIN/GLOB SERPL: 1.3 {RATIO} (ref 0.8–1.7)
ALP SERPL-CCNC: 122 U/L (ref 45–117)
ALT SERPL-CCNC: 15 U/L (ref 13–56)
ANION GAP SERPL CALC-SCNC: 6 MMOL/L (ref 3–18)
AST SERPL-CCNC: 17 U/L (ref 10–38)
BASOPHILS # BLD: 0 K/UL (ref 0–0.1)
BASOPHILS NFR BLD: 1 % (ref 0–2)
BILIRUB SERPL-MCNC: 0.5 MG/DL (ref 0.2–1)
BUN SERPL-MCNC: 24 MG/DL (ref 7–18)
BUN/CREAT SERPL: 18 (ref 12–20)
CALCIUM SERPL-MCNC: 9.3 MG/DL (ref 8.5–10.1)
CHLORIDE SERPL-SCNC: 105 MMOL/L (ref 100–111)
CHOLEST SERPL-MCNC: 144 MG/DL
CO2 SERPL-SCNC: 31 MMOL/L (ref 21–32)
CREAT SERPL-MCNC: 1.34 MG/DL (ref 0.6–1.3)
DIFFERENTIAL METHOD BLD: ABNORMAL
EOSINOPHIL # BLD: 0.1 K/UL (ref 0–0.4)
EOSINOPHIL NFR BLD: 3 % (ref 0–5)
ERYTHROCYTE [DISTWIDTH] IN BLOOD BY AUTOMATED COUNT: 13.3 % (ref 11.6–14.5)
GLOBULIN SER CALC-MCNC: 2.9 G/DL (ref 2–4)
GLUCOSE SERPL-MCNC: 74 MG/DL (ref 74–99)
HCT VFR BLD AUTO: 38.7 % (ref 35–45)
HDLC SERPL-MCNC: 77 MG/DL (ref 40–60)
HDLC SERPL: 1.9 {RATIO} (ref 0–5)
HGB BLD-MCNC: 12.7 G/DL (ref 12–16)
LDLC SERPL CALC-MCNC: 52.6 MG/DL (ref 0–100)
LIPID PROFILE,FLP: ABNORMAL
LYMPHOCYTES # BLD: 0.9 K/UL (ref 0.9–3.6)
LYMPHOCYTES NFR BLD: 22 % (ref 21–52)
MCH RBC QN AUTO: 30.8 PG (ref 24–34)
MCHC RBC AUTO-ENTMCNC: 32.8 G/DL (ref 31–37)
MCV RBC AUTO: 93.9 FL (ref 74–97)
MONOCYTES # BLD: 0.3 K/UL (ref 0.05–1.2)
MONOCYTES NFR BLD: 8 % (ref 3–10)
NEUTS SEG # BLD: 2.9 K/UL (ref 1.8–8)
NEUTS SEG NFR BLD: 66 % (ref 40–73)
PLATELET # BLD AUTO: 221 K/UL (ref 135–420)
PMV BLD AUTO: 11.8 FL (ref 9.2–11.8)
POTASSIUM SERPL-SCNC: 5.1 MMOL/L (ref 3.5–5.5)
PROT SERPL-MCNC: 6.6 G/DL (ref 6.4–8.2)
RBC # BLD AUTO: 4.12 M/UL (ref 4.2–5.3)
SODIUM SERPL-SCNC: 142 MMOL/L (ref 136–145)
T4 FREE SERPL-MCNC: 1.2 NG/DL (ref 0.7–1.5)
TRIGL SERPL-MCNC: 72 MG/DL (ref ?–150)
TSH SERPL DL<=0.05 MIU/L-ACNC: 0.77 UIU/ML (ref 0.36–3.74)
VLDLC SERPL CALC-MCNC: 14.4 MG/DL
WBC # BLD AUTO: 4.3 K/UL (ref 4.6–13.2)

## 2020-01-23 PROCEDURE — 84439 ASSAY OF FREE THYROXINE: CPT

## 2020-01-23 PROCEDURE — 80053 COMPREHEN METABOLIC PANEL: CPT

## 2020-01-23 PROCEDURE — 85025 COMPLETE CBC W/AUTO DIFF WBC: CPT

## 2020-01-23 PROCEDURE — 80061 LIPID PANEL: CPT

## 2020-01-23 RX ORDER — FUROSEMIDE 20 MG/1
20 TABLET ORAL DAILY
Qty: 90 TAB | Refills: 1 | Status: SHIPPED | OUTPATIENT
Start: 2020-01-23 | End: 2020-05-31

## 2020-01-23 RX ORDER — POTASSIUM CHLORIDE 750 MG/1
10 TABLET, EXTENDED RELEASE ORAL DAILY
Qty: 90 TAB | Refills: 1 | Status: SHIPPED | OUTPATIENT
Start: 2020-01-23 | End: 2020-05-31

## 2020-01-23 RX ORDER — CLOTRIMAZOLE AND BETAMETHASONE DIPROPIONATE 10; .64 MG/G; MG/G
CREAM TOPICAL
Qty: 45 G | Refills: 1 | Status: SHIPPED | OUTPATIENT
Start: 2020-01-23 | End: 2020-06-23

## 2020-01-23 RX ORDER — CYANOCOBALAMIN 1000 UG/ML
1000 INJECTION, SOLUTION INTRAMUSCULAR; SUBCUTANEOUS
Qty: 10 ML | Refills: 1 | Status: SHIPPED | OUTPATIENT
Start: 2020-01-23

## 2020-01-23 RX ORDER — LEVOTHYROXINE SODIUM 50 UG/1
TABLET ORAL
Qty: 90 TAB | Refills: 1 | Status: SHIPPED | OUTPATIENT
Start: 2020-01-23 | End: 2020-05-31

## 2020-01-23 RX ORDER — ROSUVASTATIN CALCIUM 40 MG/1
40 TABLET, COATED ORAL
Qty: 90 TAB | Refills: 1 | Status: SHIPPED | OUTPATIENT
Start: 2020-01-23 | End: 2020-08-03 | Stop reason: SDUPTHER

## 2020-01-23 RX ORDER — ALENDRONATE SODIUM 40 MG/1
40 TABLET ORAL
Qty: 12 TAB | Refills: 3 | Status: SHIPPED | OUTPATIENT
Start: 2020-01-23 | End: 2020-01-30 | Stop reason: SDUPTHER

## 2020-01-23 RX ORDER — SUCRALFATE 1 G/1
1 TABLET ORAL 4 TIMES DAILY
Qty: 360 TAB | Refills: 1 | Status: SHIPPED | OUTPATIENT
Start: 2020-01-23 | End: 2020-06-23

## 2020-01-23 RX ORDER — PROPRANOLOL HYDROCHLORIDE 10 MG/1
10 TABLET ORAL 2 TIMES DAILY
Qty: 180 TAB | Refills: 1 | Status: SHIPPED | OUTPATIENT
Start: 2020-01-23 | End: 2020-06-23

## 2020-01-23 RX ORDER — PHENOL/SODIUM PHENOLATE
20 AEROSOL, SPRAY (ML) MUCOUS MEMBRANE DAILY
Qty: 90 TAB | Refills: 1 | Status: SHIPPED | OUTPATIENT
Start: 2020-01-23 | End: 2021-01-07 | Stop reason: SDUPTHER

## 2020-01-27 NOTE — PATIENT INSTRUCTIONS
Hypothyroidism: Care Instructions  Your Care Instructions    You have hypothyroidism, which means that your body is not making enough thyroid hormone. This hormone helps your body use energy. If your thyroid level is low, you may feel tired, be constipated, have an increase in your blood pressure, or have dry skin or memory problems. You may also get cold easily, even when it is warm. Women with low thyroid levels may have heavy menstrual periods. A blood test to find your thyroid-stimulating hormone (TSH) level is used to check for hypothyroidism. A high TSH level may mean that you have low thyroid. When your body is not making enough thyroid hormone, TSH levels rise in an effort to make the body produce more. The treatment for hypothyroidism is to take thyroid hormone pills. You should start to feel better in 1 to 2 weeks. But it can take several months to see changes in the TSH level. You will need regular visits with your doctor to make sure you have the right dose of medicine. Most people need treatment for the rest of their lives. You will need to see your doctor regularly to have blood tests and to make sure you are doing well. Follow-up care is a key part of your treatment and safety. Be sure to make and go to all appointments, and call your doctor if you are having problems. It's also a good idea to know your test results and keep a list of the medicines you take. How can you care for yourself at home? · Take your thyroid hormone medicine exactly as prescribed. Call your doctor if you think you are having a problem with your medicine. Most people do not have side effects if they take the right amount of medicine regularly. ? Take the medicine 30 minutes before breakfast, and do not take it with calcium, vitamins, or iron. ? Do not take extra doses of your thyroid medicine. It will not help you get better any faster, and it may cause side effects.   ? If you forget to take a dose, do NOT take a double dose of medicine. Take your usual dose the next day. · Tell your doctor about all prescription, herbal, or over-the-counter products you take. · Take care of yourself. Eat a healthy diet, get enough sleep, and get regular exercise. When should you call for help? Call 911 anytime you think you may need emergency care. For example, call if:    · You passed out (lost consciousness).     · You have severe trouble breathing.     · You have a very slow heartbeat (less than 60 beats a minute).     · You have a low body temperature (95°F or below).    Call your doctor now or seek immediate medical care if:    · You feel tired, sluggish, or weak.     · You have trouble remembering things or concentrating.     · You do not begin to feel better 2 weeks after starting your medicine.    Watch closely for changes in your health, and be sure to contact your doctor if you have any problems. Where can you learn more? Go to http://juan albreto-marina.info/. Enter N688 in the search box to learn more about \"Hypothyroidism: Care Instructions. \"  Current as of: November 6, 2018  Content Version: 12.2  © 4563-7669 [employer]. Care instructions adapted under license by [email or web address] (which disclaims liability or warranty for this information). If you have questions about a medical condition or this instruction, always ask your healthcare professional. [place] [address] any warranty or liability for your use of this information. Schedule of Personalized Health Plan  (Provide Copy to Patient)  The best way to stay healthy is to live a healthy lifestyle. A healthy lifestyle includes regular exercise, eating a well-balanced diet, keeping a healthy weight and not smoking. Regular physical exams and screening tests are another important way to take care of yourself.  Preventive exams provided by health care providers can find health problems early when treatment works best and can keep you from getting certain diseases or illnesses. Preventive services include exams, lab tests, screenings, shots, monitoring and information to help you take care of your own health. All people over 65 should have a pneumonia shot. Pneumonia shots are usually only needed once in a lifetime unless your doctor decides differently. All people over 65 should have a yearly flu shot. People over 65 are at medium to high risk for Hepatitis B. Three shots are needed for complete protection. In addition to your physical exam, some screening tests are recommended:    Bone mass measurement (dexa scan) is recommended every two years  Diabetes Mellitus screening is recommended every year. Glaucoma is an eye disease caused by high pressure in the eye. An eye exam is recommended every year. Cardiovascular screening tests that check your cholesterol and other blood fat (lipid) levels are recommended every five years. Colorectal Cancer screening tests help to find pre-cancerous polyps (growths in the colon) so they can be removed before they turn into cancer. Tests ordered for screening depend on your personal and family history risk factors.     Screening for Breast Cancer is recommended yearly with a mammogram.    Screening for Cervical Cancer is recommended every two years (annually for certain risk factors, such as previous history of STD or abnormal PAP in past 7 years), with a Pelvic Exam with PAP    Here is a list of your current Health Maintenance items with a due date:  Health Maintenance   Topic Date Due    Hepatitis C Screening  1945    DTaP/Tdap/Td series (1 - Tdap) 03/27/1958    Shingrix Vaccine Age 50> (1 of 2) 03/27/1997    BREAST CANCER SCRN MAMMOGRAM  03/27/1997    FOBT Q 1 YEAR AGE 50-75  03/27/1997    GLAUCOMA SCREENING Q2Y  03/27/2012    Pneumococcal 65+ years (1 of 1 - PPSV23) 03/27/2012    MEDICARE YEARLY EXAM  01/23/2021    Bone Densitometry (Dexa) Screening Completed    Influenza Age 5 to Adult  Completed

## 2020-01-27 NOTE — PROGRESS NOTES
HISTORY OF PRESENT ILLNESS  Rikki Tracey is a 69 y.o. female. HPI   Pt is here for f/u on her thyroid, GERD, htn, IBS, B12, and cholesterol. She is fasting for labs and needs refills on all her meds. She is utd on her mammogram, colonoscopy, and bone density and will get us copies of everything. She has no other complaints. She is also due for her wellness exam so will complete that today as well. Allergies   Allergen Reactions    Erythromycin Rash       Current Outpatient Medications   Medication Sig    levothyroxine (SYNTHROID) 50 mcg tablet TAKE 1 TABLET BY MOUTH ONCE DAILY BEFORE BREAKFAST    alendronate (FOSAMAX) 40 mg tablet Take 1 Tab by mouth every seven (7) days.  clotrimazole-betamethasone (LOTRISONE) topical cream Apply to rash bid    Omeprazole delayed release (PRILOSEC D/R) 20 mg tablet Take 1 Tab by mouth daily.  furosemide (LASIX) 20 mg tablet Take 1 Tab by mouth daily.  potassium chloride (KLOR-CON) 10 mEq tablet Take 1 Tab by mouth daily.  rosuvastatin (CRESTOR) 40 mg tablet Take 1 Tab by mouth nightly.  sucralfate (CARAFATE) 1 gram tablet Take 1 Tab by mouth four (4) times daily.  cyanocobalamin (VITAMIN B12) 1,000 mcg/mL injection 1 mL by IntraMUSCular route every fourteen (14) days.  propranolol (INDERAL) 10 mg tablet Take 1 Tab by mouth two (2) times a day.  vitamin e (E GEMS) 100 unit capsule Take  by mouth daily.  rOPINIRole (REQUIP) 0.5 mg tablet Take  by mouth three (3) times daily.  ferrous sulfate (IRON) 325 mg (65 mg iron) cpER Take  by mouth.  valbenazine (INGREZZA) 80 mg cap Take  by mouth.  magnesium oxide (MAG-OX) 400 mg tablet Take 400 mg by mouth.  lamoTRIgine (LAMICTAL) 200 mg tablet Take 200 mg by mouth two (2) times a day.  donepezil (ARICEPT) 10 mg tablet Take 10 mg by mouth nightly.  lurasidone (LATUDA) 60 mg tab tablet Take 80 mg by mouth.  doxepin (SINEQUAN) 25 mg capsule Take  by mouth nightly.     hydrOXYzine pamoate (VISTARIL) 25 mg capsule Take 25 mg by mouth nightly. No current facility-administered medications for this visit. Review of Systems   Constitutional: Negative. Negative for chills, fever and malaise/fatigue. HENT: Negative. Eyes: Negative. Respiratory: Negative. Negative for cough, shortness of breath and wheezing. Cardiovascular: Negative. Negative for chest pain, palpitations and leg swelling. Gastrointestinal: Positive for heartburn (controlled on meds). Negative for abdominal pain, nausea and vomiting. Genitourinary: Negative. Negative for dysuria, frequency, hematuria and urgency. Skin: Negative. Negative for itching and rash. Neurological: Negative for dizziness. Psychiatric/Behavioral: Positive for memory loss (pt has dementia but is doing well). Visit Vitals  /84   Pulse 64   Temp 97 °F (36.1 °C) (Oral)   Resp 16   Ht 5' 7\" (1.702 m)   Wt 183 lb (83 kg)   SpO2 98%   BMI 28.66 kg/m²       Physical Exam  Constitutional:       General: She is not in acute distress. Appearance: She is well-developed. She is not diaphoretic. HENT:      Head: Normocephalic and atraumatic. Cardiovascular:      Rate and Rhythm: Normal rate and regular rhythm. Pulmonary:      Effort: Pulmonary effort is normal.      Breath sounds: Normal breath sounds. Abdominal:      Palpations: Abdomen is soft. Tenderness: There is no tenderness. Skin:     General: Skin is warm and dry. Neurological:      Mental Status: She is alert and oriented to person, place, and time. Psychiatric:         Behavior: Behavior normal.         ASSESSMENT and PLAN    ICD-10-CM ICD-9-CM    1. Acquired hypothyroidism E03.9 244.9 levothyroxine (SYNTHROID) 50 mcg tablet      TSH 3RD GENERATION      T4, FREE      T4, FREE      TSH 3RD GENERATION   2. Age-related osteoporosis without current pathological fracture M81.0 733.01 alendronate (FOSAMAX) 40 mg tablet   3.  Dermatitis L30.9 692.9 clotrimazole-betamethasone (LOTRISONE) topical cream   4. Gastroesophageal reflux disease without esophagitis K21.9 530.81 Omeprazole delayed release (PRILOSEC D/R) 20 mg tablet   5. Generalized edema R60.1 782.3 furosemide (LASIX) 20 mg tablet      potassium chloride (KLOR-CON) 10 mEq tablet   6. Hypercholesterolemia E78.00 272.0 rosuvastatin (CRESTOR) 40 mg tablet   7. Irritable bowel syndrome, unspecified type K58.9 564.1 sucralfate (CARAFATE) 1 gram tablet      REFERRAL TO GASTROENTEROLOGY   8. B12 deficiency E53.8 266.2 alendronate (FOSAMAX) 40 mg tablet      cyanocobalamin (VITAMIN B12) 1,000 mcg/mL injection   9. Essential hypertension I10 401.9 LIPID PANEL      CBC WITH AUTOMATED DIFF      METABOLIC PANEL, COMPREHENSIVE      METABOLIC PANEL, COMPREHENSIVE      CBC WITH AUTOMATED DIFF      LIPID PANEL   10. Medicare annual wellness visit, subsequent Z00.00 V70.0      Follow-up and Dispositions    · Return in about 6 months (around 7/23/2020) for follow up. Pt expressed understanding of visit summary and plans for any follow ups or referrals as well as any medications prescribed. Medicare Wellness Visit  Chiquis Rice is a 69 y.o. female and presents for annual Medicare Wellness Visit. Problem List: Reviewed with patient and discussed risk factors.     Patient Active Problem List   Diagnosis Code    Acquired hypothyroidism E03.9    Dementia without behavioral disturbance (HCC) F03.90    Mild depression ([address] F32.0    Simple chronic bronchitis ([address] J41.0    Essential hypertension I10    CKD (chronic kidney disease) stage 3, GFR 30-59 ml/min ([address] N18.3       Current medical providers:  Patient Care Team:  Jonathan Colbert PA-C as PCP - General (Physician Assistant)  Jonathan Colbert PA-C as PCP [facility] Empaneled Provider    Adry Atkinson: Reviewed with patient  Past Surgical History:   Procedure Laterality Date    HX GI      Gastric Bypass X3, hemorrhoidectomy    HX GYN Hysterectomy, , tubal ligation     HX HEENT      cataract 2015    HX ORTHOPAEDIC      Laminectomy 2004 and 2015, meniscus Left , TKR right 2012, Left 2016        SH: Reviewed with patient  Social History     Tobacco Use    Smoking status: Never Smoker    Smokeless tobacco: Never Used   Substance Use Topics    Alcohol use: No    Drug use: No       FH: Reviewed with patient  Family History   Problem Relation Age of Onset    Hypertension Mother     Hypertension Father     Lung Disease Father     Cancer Brother        Medications/Allergies: Reviewed with patient  Current Outpatient Medications on File Prior to Visit   Medication Sig Dispense Refill    vitamin e (E GEMS) 100 unit capsule Take  by mouth daily.  rOPINIRole (REQUIP) 0.5 mg tablet Take  by mouth three (3) times daily.  ferrous sulfate (IRON) 325 mg (65 mg iron) cpER Take  by mouth.  valbenazine (INGREZZA) 80 mg cap Take  by mouth.  magnesium oxide (MAG-OX) 400 mg tablet Take 400 mg by mouth.  lamoTRIgine (LAMICTAL) 200 mg tablet Take 200 mg by mouth two (2) times a day.  donepezil (ARICEPT) 10 mg tablet Take 10 mg by mouth nightly.  lurasidone (LATUDA) 60 mg tab tablet Take 80 mg by mouth.  doxepin (SINEQUAN) 25 mg capsule Take  by mouth nightly.  hydrOXYzine pamoate (VISTARIL) 25 mg capsule Take 25 mg by mouth nightly.  [DISCONTINUED] guaiFENesin (ROBITUSSIN) 100 mg/5 mL liquid Take 200 mg by mouth. No current facility-administered medications on file prior to visit. Allergies   Allergen Reactions    Erythromycin Rash       Objective:  Visit Vitals  /84   Pulse 64   Temp 97 °F (36.1 °C) (Oral)   Resp 16   Ht 5' 7\" (1.702 m)   Wt 183 lb (83 kg)   SpO2 98%   BMI 28.66 kg/m²    Body mass index is 28.66 kg/m².     Assessment of cognitive impairment: Alert and oriented x 3    Depression Screen:   3 most recent PHQ Screens 2018   PHQ Not Done Active Diagnosis of Depression or Bipolar Disorder       Fall Risk Assessment:    Fall Risk Assessment, last 12 mths 1/23/2020   Able to walk? Yes   Fall in past 12 months? No   Fall with injury? -   Number of falls in past 12 months -   Fall Risk Score -       Functional Ability:   Does the patient exhibit a steady gait? yes   How long did it take the patient to get up and walk from a sitting position? <10sec   Is the patient self reliant?  (ie can do own laundry, meals, household chores)  yes     Does the patient handle his/her own medications? yes     Does the patient handle his/her own money? yes     Is the patients home safe (ie good lighting, handrails on stairs and bath, etc.)? yes     Did you notice or did patient express any hearing difficulties? no     Did you notice or did patient express any vision difficulties?   no     Were distance and reading eye charts used? yes       Advance Care Planning:   Patient was offered the opportunity to discuss advance care planning:  yes     Does patient have an Advance Directive:  no   If no, did you provide information on Caring Connections?   yes       Plan:      Orders Placed This Encounter    TSH 3RD GENERATION    T4, FREE    LIPID PANEL    CBC WITH AUTOMATED DIFF    METABOLIC PANEL, COMPREHENSIVE    REFERRAL TO GASTROENTEROLOGY    levothyroxine (SYNTHROID) 50 mcg tablet    alendronate (FOSAMAX) 40 mg tablet    clotrimazole-betamethasone (LOTRISONE) topical cream    Omeprazole delayed release (PRILOSEC D/R) 20 mg tablet    furosemide (LASIX) 20 mg tablet    potassium chloride (KLOR-CON) 10 mEq tablet    rosuvastatin (CRESTOR) 40 mg tablet    sucralfate (CARAFATE) 1 gram tablet    cyanocobalamin (VITAMIN B12) 1,000 mcg/mL injection    propranolol (INDERAL) 10 mg tablet       Health Maintenance   Topic Date Due    Hepatitis C Screening  1945    DTaP/Tdap/Td series (1 - Tdap) 03/27/1958    Shingrix Vaccine Age 50> (1 of 2) 03/27/1997    BREAST CANCER SCRN MAMMOGRAM  03/27/1997    FOBT Q 1 YEAR AGE 50-75  03/27/1997    GLAUCOMA SCREENING Q2Y  03/27/2012    Pneumococcal 65+ years (1 of 1 - PPSV23) 03/27/2012    MEDICARE YEARLY EXAM  01/23/2021    Bone Densitometry (Dexa) Screening  Completed    Influenza Age 5 to Adult  Completed       *Patient verbalized understanding and agreement with the plan. A copy of the After Visit Summary with personalized health plan was given to the patient today.

## 2020-01-30 DIAGNOSIS — E53.8 B12 DEFICIENCY: ICD-10-CM

## 2020-01-30 DIAGNOSIS — M81.0 AGE-RELATED OSTEOPOROSIS WITHOUT CURRENT PATHOLOGICAL FRACTURE: ICD-10-CM

## 2020-01-30 RX ORDER — ALENDRONATE SODIUM 40 MG/1
40 TABLET ORAL
Qty: 12 TAB | Refills: 3 | Status: SHIPPED | OUTPATIENT
Start: 2020-01-30 | End: 2020-08-11 | Stop reason: ALTCHOICE

## 2020-04-07 ENCOUNTER — TELEPHONE (OUTPATIENT)
Dept: FAMILY MEDICINE CLINIC | Facility: CLINIC | Age: 75
End: 2020-04-07

## 2020-04-07 NOTE — TELEPHONE ENCOUNTER
She is calling over to Dr. Seda Block to get them to send us the paper work stating medication and dosage

## 2020-04-10 DIAGNOSIS — K52.832 LYMPHOCYTIC COLITIS: Primary | ICD-10-CM

## 2020-04-10 NOTE — TELEPHONE ENCOUNTER
Patient is requesting a prescription for cholestyram 40mg packet 1/2 packet daily with apple juice. She states that Dr India Hung has sent something over in reference to this medication.

## 2020-04-13 RX ORDER — CHOLESTYRAMINE 4 G/9G
4 POWDER, FOR SUSPENSION ORAL
Qty: 3 CAN | Refills: 0 | Status: SHIPPED | OUTPATIENT
Start: 2020-04-13 | End: 2020-04-13 | Stop reason: CLARIF

## 2020-04-13 RX ORDER — CHOLESTYRAMINE 4 G/9G
2 POWDER, FOR SUSPENSION ORAL 2 TIMES DAILY WITH MEALS
Qty: 1 CAN | Refills: 1 | Status: SHIPPED | OUTPATIENT
Start: 2020-04-13 | End: 2020-05-15

## 2020-04-20 ENCOUNTER — TELEPHONE (OUTPATIENT)
Dept: FAMILY MEDICINE CLINIC | Facility: CLINIC | Age: 75
End: 2020-04-20

## 2020-04-20 NOTE — TELEPHONE ENCOUNTER
Patient called to say the [facility] [employer] needs more information about her medication. I asked what information they were needing.   She stated \"I don't know, it's the powder and they need more information\"

## 2020-05-14 ENCOUNTER — VIRTUAL VISIT (OUTPATIENT)
Dept: FAMILY MEDICINE CLINIC | Facility: CLINIC | Age: 75
End: 2020-05-14

## 2020-05-14 DIAGNOSIS — J30.9 ALLERGIC RHINITIS, UNSPECIFIED SEASONALITY, UNSPECIFIED TRIGGER: ICD-10-CM

## 2020-05-14 DIAGNOSIS — R05.9 COUGH: Primary | ICD-10-CM

## 2020-05-14 DIAGNOSIS — R60.1 GENERALIZED EDEMA: ICD-10-CM

## 2020-05-14 RX ORDER — GABAPENTIN 100 MG/1
CAPSULE ORAL
COMMUNITY
Start: 2020-04-27 | End: 2020-06-23 | Stop reason: SINTOL

## 2020-05-14 RX ORDER — MONTELUKAST SODIUM 10 MG/1
10 TABLET ORAL DAILY
Qty: 30 TAB | Refills: 5 | Status: SHIPPED | OUTPATIENT
Start: 2020-05-14 | End: 2021-09-16 | Stop reason: SDUPTHER

## 2020-05-14 RX ORDER — BENZONATATE 200 MG/1
200 CAPSULE ORAL
Qty: 30 CAP | Refills: 0 | Status: SHIPPED | OUTPATIENT
Start: 2020-05-14 | End: 2020-05-24

## 2020-05-14 NOTE — PROGRESS NOTES
HISTORY OF PRESENT ILLNESS  Fredi Rockwell is a 70 y.o. female. HPI   Pt is being seen via doxy. me for a productive cough x 1 week that is worse at night and slightly increased lower leg edema bilaterally. Denies SOB, palpitations, fever, chills, ST, CP, HA, dizziness, rash, otalgia, and urinary changes. Pt was recently started on neurontin and has had increased confusion and balance issues since along with nausea. She has a f/u with neuro today and will discuss that with them. Advised pt to start singulair and double her lasix for the next week and follow up then or sooner if worsening. Allergies   Allergen Reactions    Erythromycin Rash    Prednisone Hives     States can't take just prednisone but mixes are ok       Current Outpatient Medications   Medication Sig    montelukast (SINGULAIR) 10 mg tablet Take 1 Tab by mouth daily.  benzonatate (TESSALON) 200 mg capsule Take 1 Cap by mouth three (3) times daily as needed for Cough for up to 10 days.  gabapentin (NEURONTIN) 100 mg capsule TAKE 1 CAPSULE BY MOUTH THREE TIMES DAILY FOR 30 DAYS    cholestyramine-sucrose (Questran) 4 gram powder Take 2 g by mouth two (2) times daily (with meals).  alendronate (FOSAMAX) 40 mg tablet Take 1 Tab by mouth every seven (7) days.  levothyroxine (SYNTHROID) 50 mcg tablet TAKE 1 TABLET BY MOUTH ONCE DAILY BEFORE BREAKFAST    clotrimazole-betamethasone (LOTRISONE) topical cream Apply to rash bid    Omeprazole delayed release (PRILOSEC D/R) 20 mg tablet Take 1 Tab by mouth daily.  furosemide (LASIX) 20 mg tablet Take 1 Tab by mouth daily.  potassium chloride (KLOR-CON) 10 mEq tablet Take 1 Tab by mouth daily.  rosuvastatin (CRESTOR) 40 mg tablet Take 1 Tab by mouth nightly.  sucralfate (CARAFATE) 1 gram tablet Take 1 Tab by mouth four (4) times daily.  cyanocobalamin (VITAMIN B12) 1,000 mcg/mL injection 1 mL by IntraMUSCular route every fourteen (14) days.     propranolol (INDERAL) 10 mg tablet Take 1 Tab by mouth two (2) times a day.  vitamin e (E GEMS) 100 unit capsule Take  by mouth daily.  rOPINIRole (REQUIP) 0.5 mg tablet Take  by mouth three (3) times daily.  ferrous sulfate (IRON) 325 mg (65 mg iron) cpER Take  by mouth.  valbenazine (INGREZZA) 80 mg cap Take  by mouth.  magnesium oxide (MAG-OX) 400 mg tablet Take 400 mg by mouth.  lamoTRIgine (LAMICTAL) 200 mg tablet Take 200 mg by mouth two (2) times a day.  donepezil (ARICEPT) 10 mg tablet Take 10 mg by mouth nightly.  lurasidone (LATUDA) 60 mg tab tablet Take 80 mg by mouth.  doxepin (SINEQUAN) 25 mg capsule Take  by mouth nightly.  hydrOXYzine pamoate (VISTARIL) 25 mg capsule Take 25 mg by mouth nightly. No current facility-administered medications for this visit. Review of Systems   Constitutional: Negative. Negative for chills, fever and malaise/fatigue. HENT: Negative. Eyes: Negative. Respiratory: Positive for cough and sputum production. Negative for hemoptysis, shortness of breath and wheezing. Cardiovascular: Positive for leg swelling (slightly worse). Negative for chest pain and palpitations. Gastrointestinal: Positive for heartburn (controlled on meds). Negative for abdominal pain, nausea and vomiting. Genitourinary: Negative. Negative for dysuria, frequency, hematuria and urgency. Skin: Negative. Negative for itching and rash. Neurological: Positive for tremors (neuro appt today). Negative for dizziness. Psychiatric/Behavioral: Positive for memory loss (pt has dementia but is doing well). Physical Exam  Constitutional:       General: She is not in acute distress. Appearance: Normal appearance. She is not ill-appearing. Neurological:      Mental Status: She is alert and oriented to person, place, and time. Motor: Tremor present. Psychiatric:         Mood and Affect: Mood normal.         Behavior: Behavior normal.         Thought Content:  Thought content normal.         Judgment: Judgment normal.         ASSESSMENT and PLAN    ICD-10-CM ICD-9-CM    1. Cough R05 786.2 benzonatate (TESSALON) 200 mg capsule   2. Generalized edema R60.1 782.3    3. Allergic rhinitis, unspecified seasonality, unspecified trigger J30.9 477.9 montelukast (SINGULAIR) 10 mg tablet     Diagnoses and all orders for this visit:    1. Cough  -     benzonatate (TESSALON) 200 mg capsule; Take 1 Cap by mouth three (3) times daily as needed for Cough for up to 10 days. 2. Generalized edema    3. Allergic rhinitis, unspecified seasonality, unspecified trigger  -     montelukast (SINGULAIR) 10 mg tablet; Take 1 Tab by mouth daily. Pt expressed understanding of visit summary and plans for any follow ups or referrals as well as any medications prescribed. Seen by synchronous (real-time) audio-video technology via doxy. me on 05/14/2020  . The patient is aware that this patient-initiated Telehealth encounter is a billable service, with coverage as determined by his or her insurance carrier. He/She is aware that they may receive a bill and has provided verbal consent to proceed: Yes        I was in the office while conducting this encounter.

## 2020-05-14 NOTE — PATIENT INSTRUCTIONS
Cough: Care Instructions Your Care Instructions A cough is your body's response to something that bothers your throat or airways. Many things can cause a cough. You might cough because of a cold or the flu, bronchitis, or asthma. Smoking, postnasal drip, allergies, and stomach acid that backs up into your throat also can cause coughs. A cough is a symptom, not a disease. Most coughs stop when the cause, such as a cold, goes away. You can take a few steps at home to cough less and feel better. Follow-up care is a key part of your treatment and safety. Be sure to make and go to all appointments, and call your doctor if you are having problems. It's also a good idea to know your test results and keep a list of the medicines you take. How can you care for yourself at home? · Drink lots of water and other fluids. This helps thin the mucus and soothes a dry or sore throat. Honey or lemon juice in hot water or tea may ease a dry cough. · Take cough medicine as directed by your doctor. · Prop up your head on pillows to help you breathe and ease a dry cough. · Try cough drops to soothe a dry or sore throat. Cough drops don't stop a cough. Medicine-flavored cough drops are no better than candy-flavored drops or hard candy. · Do not smoke. Avoid secondhand smoke. If you need help quitting, talk to your doctor about stop-smoking programs and medicines. These can increase your chances of quitting for good. When should you call for help? Call 911 anytime you think you may need emergency care.  For example, call if: 
  · You have severe trouble breathing.  
 Call your doctor now or seek immediate medical care if: 
  · You cough up blood.  
  · You have new or worse trouble breathing.  
  · You have a new or higher fever.  
  · You have a new rash.  
 Watch closely for changes in your health, and be sure to contact your doctor if: 
  · You cough more deeply or more often, especially if you notice more mucus or a change in the color of your mucus.  
  · You have new symptoms, such as a sore throat, an earache, or sinus pain.  
  · You do not get better as expected. Where can you learn more? Go to http://juan alberto-marina.info/ Enter D279 in the search box to learn more about \"Cough: Care Instructions. \" Current as of: June 9, 2019Content Version: 12.4 © 1994-3095 [employer]. Care instructions adapted under license by [employer] (which disclaims liability or warranty for this information). If you have questions about a medical condition or this instruction, always ask your healthcare professional. [address] any warranty or liability for your use of this information.

## 2020-05-15 DIAGNOSIS — K52.832 LYMPHOCYTIC COLITIS: ICD-10-CM

## 2020-05-15 RX ORDER — CHOLESTYRAMINE 4 G/9G
POWDER, FOR SUSPENSION ORAL
Qty: 756 G | Refills: 2 | Status: SHIPPED | OUTPATIENT
Start: 2020-05-15 | End: 2020-10-05

## 2020-05-22 ENCOUNTER — TELEPHONE (OUTPATIENT)
Dept: FAMILY MEDICINE CLINIC | Facility: CLINIC | Age: 75
End: 2020-05-22

## 2020-05-22 NOTE — TELEPHONE ENCOUNTER
Patient and daughter in law called because she having pulling in the left shoulder and she seems to be walking side ways to that side. They can't really tell if she has any facial droop but they do know something is not quite right. She is going to take her to urgent care and follow up back wit us on Monday.

## 2020-06-02 ENCOUNTER — TELEPHONE (OUTPATIENT)
Dept: FAMILY MEDICINE CLINIC | Facility: CLINIC | Age: 75
End: 2020-06-02

## 2020-06-02 DIAGNOSIS — R32 URINARY INCONTINENCE, UNSPECIFIED TYPE: ICD-10-CM

## 2020-06-02 DIAGNOSIS — R29.898 WEAKNESS OF BOTH LEGS: ICD-10-CM

## 2020-06-02 DIAGNOSIS — M54.42 CHRONIC BILATERAL LOW BACK PAIN WITH BILATERAL SCIATICA: Primary | ICD-10-CM

## 2020-06-02 DIAGNOSIS — G89.29 CHRONIC BILATERAL LOW BACK PAIN WITH BILATERAL SCIATICA: Primary | ICD-10-CM

## 2020-06-02 DIAGNOSIS — M54.41 CHRONIC BILATERAL LOW BACK PAIN WITH BILATERAL SCIATICA: Primary | ICD-10-CM

## 2020-06-04 ENCOUNTER — HOSPITAL ENCOUNTER (OUTPATIENT)
Dept: MRI IMAGING | Age: 75
Discharge: HOME | End: 2020-06-04
Attending: PHYSICIAN ASSISTANT
Payer: MEDICARE

## 2020-06-04 ENCOUNTER — TELEPHONE (OUTPATIENT)
Dept: FAMILY MEDICINE CLINIC | Facility: CLINIC | Age: 75
End: 2020-06-04

## 2020-06-04 VITALS — BODY MASS INDEX: 34.46 KG/M2 | WEIGHT: 220 LBS

## 2020-06-04 DIAGNOSIS — R29.898 WEAKNESS OF BOTH LEGS: ICD-10-CM

## 2020-06-04 DIAGNOSIS — M54.41 CHRONIC BILATERAL LOW BACK PAIN WITH BILATERAL SCIATICA: ICD-10-CM

## 2020-06-04 DIAGNOSIS — R32 URINARY INCONTINENCE, UNSPECIFIED TYPE: ICD-10-CM

## 2020-06-04 DIAGNOSIS — G89.29 CHRONIC BILATERAL LOW BACK PAIN WITH BILATERAL SCIATICA: ICD-10-CM

## 2020-06-04 DIAGNOSIS — R11.0 NAUSEA: Primary | ICD-10-CM

## 2020-06-04 DIAGNOSIS — M54.42 CHRONIC BILATERAL LOW BACK PAIN WITH BILATERAL SCIATICA: ICD-10-CM

## 2020-06-04 PROCEDURE — 72158 MRI LUMBAR SPINE W/O & W/DYE: CPT

## 2020-06-04 PROCEDURE — A9575 INJ GADOTERATE MEGLUMI 0.1ML: HCPCS | Performed by: PHYSICIAN ASSISTANT

## 2020-06-04 PROCEDURE — 74011636320 HC RX REV CODE- 636/320: Performed by: PHYSICIAN ASSISTANT

## 2020-06-04 RX ORDER — ONDANSETRON 4 MG/1
4 TABLET, ORALLY DISINTEGRATING ORAL
Qty: 30 TAB | Refills: 0 | Status: SHIPPED | OUTPATIENT
Start: 2020-06-04 | End: 2020-06-23

## 2020-06-04 RX ADMIN — GADOTERATE MEGLUMINE 20 ML: 376.9 INJECTION INTRAVENOUS at 12:22

## 2020-06-04 NOTE — TELEPHONE ENCOUNTER
Pt daughter Fabi Levels called stating pt is unable to keep any food or liquids down, pt is having a lot of nausea. She would like to speak to nurse regarding what OTC meds she can give pt.  Please advise

## 2020-06-04 NOTE — TELEPHONE ENCOUNTER
I will send over zofran but if not better or if pt develops CP, SOB, or fever should go to the ER. 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 She should f/u with neurosurgery. Who was her previous surgeon or does she want us to refer her somewhere else?

## 2020-06-15 ENCOUNTER — PATIENT OUTREACH (OUTPATIENT)
Dept: CASE MANAGEMENT | Age: 75
End: 2020-06-15

## 2020-06-15 RX ORDER — OMEPRAZOLE 20 MG/1
CAPSULE, DELAYED RELEASE ORAL
Qty: 90 CAP | Refills: 1 | Status: SHIPPED | OUTPATIENT
Start: 2020-06-15 | End: 2020-06-23 | Stop reason: SDUPTHER

## 2020-06-15 NOTE — PROGRESS NOTES
Patient was admitted to [facility] on 6/4/20 and discharged on 6/11/20 for N/V/abdominal Pain, dizziness/veritigo. Patient received D/C notification on 6/15/20    Attempt to contact patient via telephone on 6/15/20 for  follow up. Unable to reach. Left message on voicemail with office contact information. No Patient medical information left on message.     [address] follow up appointment(s):   Future Appointments   Date Time Provider Gui Woodward   6/23/2020 10:30 AM Edis Bruno PA-C [id number] [facility]

## 2020-06-16 ENCOUNTER — PATIENT OUTREACH (OUTPATIENT)
Dept: CASE MANAGEMENT | Age: 75
End: 2020-06-16

## 2020-06-16 RX ORDER — MECLIZINE HYDROCHLORIDE 25 MG/1
TABLET ORAL
COMMUNITY
End: 2021-01-07

## 2020-06-16 NOTE — Clinical Note
Laila Ms. Bruno,  [id number] [address] -Patient was admitted to [facility] on 6/4/20 and discharged on 6/11/20 for N/V/abdominal Pain, dizziness/veritigo. Patient states that she is feeling better. Pt. Lasix was discontinued. Her Bp today is 123/73. She has a question as to why her aricept was stop. According to D/C summary it was stopped due to Prolonged QT on Aricept. Pt. made aware and thanked us.    Thank you Akilah CTN  [phone]

## 2020-06-16 NOTE — PROGRESS NOTES
Patient was admitted to [facility] on 20 and discharged on 20 for N/V/abdominal Pain, dizziness/veritigo. Top Discharge Challenges to be reviewed by the provider   Additional needs identified to be addressed with provider   Pt. Has a question regarding why her Aricept was discontinued. Noted per D/C summary notes, Aricept was stop due to prolonged QT. Pt. made aware.       home health care    Method of communication with provider : chart routing       Advance Care Planning:   Does patient have an Advance Directive:  not on file     Inpatient Readmission Risk score: n/a  Was this a readmission? no   Patient stated reason for the admission: n/a  Patients top risk factors for readmission: medical condition  Interventions to address risk factors: follow up with PCP    Care Transition Nurse (CTN) contacted the patient by telephone to perform post hospital discharge assessment. Verified name and  with patient as identifiers. Provided introduction to self, and explanation of the CTN role. Patient states that she is doing well. Patient states that she has good support from family. No questions or concerns at this time as per Pt. CTN reviewed discharge instructions, medical action plan and red flags with patient who verbalized understanding. Patient given an opportunity to ask questions and does not have any further questions or concerns at this time. The patient agrees to contact the PCP office for questions related to their healthcare. Medication reconciliation was performed with patient, who verbalizes understanding of administration of home medications. Advised obtaining a 90-day supply of all daily and as-needed medications. Home Health/Outpatient orders at discharge: home health care  [address]: [facility]  Date of initial visit: 20    Durable Medical Equipment ordered at discharge: none noted at this time.      Covid Risk Education    Patient has following risk factors of: COPD, asthma and age. Education provided regarding infection prevention, and signs and symptoms of COVID-19 and when to seek medical attention with patient who verbalized understanding. Discussed exposure protocols and quarantine From CDC: Are you at higher risk for severe illness?  and given an opportunity for questions and concerns. The patient agrees to contact the COVID-19 hotline [phone] or PCP office for questions related to COVID-19. For more information on steps you can take to protect yourself, see CDC's How to Protect Yourself     Patient/family/caregiver given information for [facility] and agrees to enroll [facility] follow up appointment(s):   Future Appointments   Date Time Provider Gui Woodward   6/23/2020 10:30 AM Dalton Bruno PA-C [facility]     Non-[facility] follow up appointment(s): none noted at this time. Plan for follow-up call in 10-14 days based on severity of symptoms and risk factors. CTN provided contact information for future needs. Goals Addressed                 This Visit's Progress     Prevent complications post hospitalization. Patient will take all medications as prescribed. Pt. Will attend f/u appt with PCP on 6/23/20. Pt. Will call CTN or PCP office for any questions concerns and/or needs.  Understands red flags post discharge. Patient will go to the nearest emergency room for chest pain, shortness of breath, returning of symptoms that brought her to the emergency room and/or worsening of symptoms. Patient will contact PCP office for any questions or concerns related to healthcare.

## 2020-06-23 ENCOUNTER — HOSPITAL ENCOUNTER (OUTPATIENT)
Dept: LAB | Age: 75
Discharge: HOME | End: 2020-06-23
Payer: MEDICARE

## 2020-06-23 ENCOUNTER — OFFICE VISIT (OUTPATIENT)
Dept: FAMILY MEDICINE CLINIC | Facility: CLINIC | Age: 75
End: 2020-06-23

## 2020-06-23 VITALS
RESPIRATION RATE: 16 BRPM | WEIGHT: 181 LBS | DIASTOLIC BLOOD PRESSURE: 70 MMHG | HEART RATE: 87 BPM | SYSTOLIC BLOOD PRESSURE: 110 MMHG | OXYGEN SATURATION: 97 % | BODY MASS INDEX: 28.41 KG/M2 | TEMPERATURE: 98.4 F | HEIGHT: 67 IN

## 2020-06-23 DIAGNOSIS — R82.90 URINE ABNORMALITY: ICD-10-CM

## 2020-06-23 DIAGNOSIS — N30.00 ACUTE CYSTITIS WITHOUT HEMATURIA: ICD-10-CM

## 2020-06-23 DIAGNOSIS — R29.898 LEFT LEG WEAKNESS: ICD-10-CM

## 2020-06-23 DIAGNOSIS — R11.0 NAUSEA: ICD-10-CM

## 2020-06-23 DIAGNOSIS — R39.89 URINE DISCOLORATION: Primary | ICD-10-CM

## 2020-06-23 DIAGNOSIS — R39.89 URINE DISCOLORATION: ICD-10-CM

## 2020-06-23 DIAGNOSIS — R93.7 ABNORMAL MRI, LUMBAR SPINE: ICD-10-CM

## 2020-06-23 DIAGNOSIS — G89.29 CHRONIC BILATERAL LOW BACK PAIN WITH BILATERAL SCIATICA: ICD-10-CM

## 2020-06-23 DIAGNOSIS — M54.41 CHRONIC BILATERAL LOW BACK PAIN WITH BILATERAL SCIATICA: ICD-10-CM

## 2020-06-23 DIAGNOSIS — R30.0 DYSURIA: ICD-10-CM

## 2020-06-23 DIAGNOSIS — M54.42 CHRONIC BILATERAL LOW BACK PAIN WITH BILATERAL SCIATICA: ICD-10-CM

## 2020-06-23 DIAGNOSIS — Z09 HOSPITAL DISCHARGE FOLLOW-UP: ICD-10-CM

## 2020-06-23 LAB
APPEARANCE UR: ABNORMAL
BACTERIA URNS QL MICRO: ABNORMAL /HPF
BILIRUB UR QL STRIP: NEGATIVE
BILIRUB UR QL: NEGATIVE
COLOR UR: YELLOW
EPITH CASTS URNS QL MICRO: ABNORMAL /LPF (ref 0–5)
GLUCOSE UR STRIP.AUTO-MCNC: NEGATIVE MG/DL
GLUCOSE UR-MCNC: NEGATIVE MG/DL
HGB UR QL STRIP: ABNORMAL
KETONES P FAST UR STRIP-MCNC: NEGATIVE MG/DL
KETONES UR QL STRIP.AUTO: NEGATIVE MG/DL
LEUKOCYTE ESTERASE UR QL STRIP.AUTO: ABNORMAL
NITRITE UR QL STRIP.AUTO: NEGATIVE
PH UR STRIP: 7 [PH] (ref 4.6–8)
PH UR STRIP: 7.5 [PH] (ref 5–8)
PROT UR QL STRIP: NEGATIVE
PROT UR STRIP-MCNC: ABNORMAL MG/DL
RBC #/AREA URNS HPF: ABNORMAL /HPF (ref 0–5)
SP GR UR REFRACTOMETRY: 1.02 (ref 1–1.03)
SP GR UR STRIP: 1.02 (ref 1–1.03)
UA UROBILINOGEN AMB POC: NORMAL (ref 0.2–1)
URINALYSIS CLARITY POC: NORMAL
URINALYSIS COLOR POC: NORMAL
URINE BLOOD POC: NORMAL
URINE LEUKOCYTES POC: NORMAL
URINE NITRITES POC: NEGATIVE
UROBILINOGEN UR QL STRIP.AUTO: 0.2 EU/DL (ref 0.2–1)
WBC URNS QL MICRO: ABNORMAL /HPF (ref 0–4)

## 2020-06-23 PROCEDURE — 87086 URINE CULTURE/COLONY COUNT: CPT

## 2020-06-23 PROCEDURE — 87186 SC STD MICRODIL/AGAR DIL: CPT

## 2020-06-23 PROCEDURE — 81001 URINALYSIS AUTO W/SCOPE: CPT

## 2020-06-23 PROCEDURE — 87077 CULTURE AEROBIC IDENTIFY: CPT

## 2020-06-23 RX ORDER — NITROFURANTOIN 25; 75 MG/1; MG/1
100 CAPSULE ORAL 2 TIMES DAILY
Qty: 14 CAP | Refills: 0 | Status: SHIPPED | OUTPATIENT
Start: 2020-06-23 | End: 2020-06-26 | Stop reason: ALTCHOICE

## 2020-06-23 NOTE — PROGRESS NOTES
Chief Complaint   Patient presents with   [facility] Follow Up    Incomplete Bladder Emptying     cloudy   1. Have you been to the ER, urgent care clinic since your last visit? Hospitalized since your last visit? Yes When: SPAH on 6/4 weakness and dizziness    2. Have you seen or consulted any other health care providers outside of the [address] [place] [address] since your last visit? Include any pap smears or colon screening.  No

## 2020-06-24 NOTE — PROGRESS NOTES
HISTORY OF PRESENT ILLNESS  Yesy Hwang is a 70 y.o. female. HPI   MsLu Hwang is a 70y.o. year old female, she is seen today for Transition of Care services following a hospital discharge for N/V with dizziness and vertigo on Thursday 06/11/2020. Our office Nurse Navigator performed an outreach to Ms. Esposito on Monday 06/15/2020 (within 2 business days of discharge) to complete medication reconciliation and a telephonic assessment of her condition. She is here with her daughter today. Her hosp course was as follows:    June 9: doing well, up in chair. Feels a little lightheaded, possibly because she hasn't eaten anything in several days. /62. Per surgery will advance diet today, full liquids for lunch then solid food for dinner. If tolerates diet can go home tomorrow. Had BM today. Stop IVF. June 8: EGD today showed gastric polyps, biopsied, and esophagitis that is likely 2/2 NG tube. Started on clears. No vomiting since admission. AFVSS. WBC 5, Hb 10. Plan to advance diet tomorrow and DC home if tolerates diet and ok with other services. June 7: feels better, minimal or no pain, no vomiting. Has small flatus but no BM. Note EGD planned for tomorrow. She wants CLD but will defer advancement of diet to surgery. June 6: Had NG temporarily which she hated, now discontinued. No further vomiting. Small flatus, no BM so far. Very mild BAD pain, discomfort. ABD exam benign, soft, non tender, not distended, no guarding or rebound. AFVSS. Labs look fine. On ice chips PO. TSH normal. Cont IVF until diet advanced. Awaits return of bowel function. June 5: admitted for ABD pain, nausea, vomiting last night. Has prior ABD surgery x 3. CT shows dilated small bowel loops, could be from adhesions or internal hernia. Surgery following. Afebrile. /63. WBC 6.9 Hb 12.3. No currently vomiting and has managed to avoid NG tube so far. Sugar a little low, add D5.        Pt was advised to stop Aricept due to prolonged QT as well as lasix and potassium bc her BP was low and her fluid intake was not sufficient. They mentioned restarting lasix if pts fluid intake increased. She has f/u with pulmonology and neurologist in 2 weeks. She does have some urinary frequency and wants to be sure she does not have a UTI, We also reviewed her l spine MRI as she was in the hospital when we tried to contact her w results. She will see neurosurgery for eval as she is afraid her left leg weakness and falling  is due to her back. Allergies   Allergen Reactions    Erythromycin Rash    Gabapentin Other (comments)     Psych/hallucinations    Prednisone Hives     States can't take just prednisone but mixes are ok       Current Outpatient Medications   Medication Sig    nitrofurantoin, macrocrystal-monohydrate, (Macrobid) 100 mg capsule Take 1 Cap by mouth two (2) times a day.  meclizine (ANTIVERT) 25 mg tablet Take  by mouth every eight (8) hours as needed for Dizziness.  levothyroxine (SYNTHROID) 50 mcg tablet TAKE 1 TABLET BY MOUTH ONCE DAILY BEFORE BREAKFAST    alendronate (FOSAMAX) 40 mg tablet Take 1 Tab by mouth every seven (7) days.  Omeprazole delayed release (PRILOSEC D/R) 20 mg tablet Take 1 Tab by mouth daily.  rosuvastatin (CRESTOR) 40 mg tablet Take 1 Tab by mouth nightly.  cyanocobalamin (VITAMIN B12) 1,000 mcg/mL injection 1 mL by IntraMUSCular route every fourteen (14) days.  vitamin e (E GEMS) 100 unit capsule Take  by mouth daily.  rOPINIRole (REQUIP) 0.5 mg tablet Take  by mouth three (3) times daily.  ferrous sulfate (IRON) 325 mg (65 mg iron) cpER Take  by mouth.  valbenazine (INGREZZA) 80 mg cap Take  by mouth.  magnesium oxide (MAG-OX) 400 mg tablet Take 400 mg by mouth.  lamoTRIgine (LAMICTAL) 200 mg tablet Take 200 mg by mouth two (2) times a day.  lurasidone (LATUDA) 60 mg tab tablet Take 80 mg by mouth.  doxepin (SINEQUAN) 25 mg capsule Take  by mouth nightly.  hydrOXYzine pamoate (VISTARIL) 25 mg capsule Take 25 mg by mouth nightly.  cholestyramine-sucrose 4 gram powder MIX 2 GRAMS WITH LIQUID AND TAKE TWICE DAILY WITH MEALS    montelukast (SINGULAIR) 10 mg tablet Take 1 Tab by mouth daily. No current facility-administered medications for this visit. Review of Systems   Constitutional: Negative. Negative for chills, fever and malaise/fatigue. HENT: Negative. Eyes: Negative. Respiratory: Negative. Negative for cough, shortness of breath and wheezing. Cardiovascular: Negative. Negative for chest pain, palpitations and leg swelling. Gastrointestinal: Positive for heartburn (controlled on meds). Negative for abdominal pain, blood in stool, constipation, diarrhea, melena, nausea and vomiting. Genitourinary: Positive for frequency. Negative for dysuria, hematuria and urgency. Skin: Negative. Negative for itching and rash. Neurological: Negative for dizziness and headaches. Psychiatric/Behavioral: Positive for memory loss (pt has dementia but is doing well). Negative for depression. The patient is not nervous/anxious. Visit Vitals  /70   Pulse 87   Temp 98.4 °F (36.9 °C) (Oral)   Resp 16   Ht 5' 7\" (1.702 m)   Wt 181 lb (82.1 kg)   SpO2 97%   BMI 28.35 kg/m²       Physical Exam  Constitutional:       General: She is not in acute distress. Appearance: She is well-developed. She is not diaphoretic. HENT:      Head: Normocephalic and atraumatic. Cardiovascular:      Rate and Rhythm: Normal rate and regular rhythm. Pulmonary:      Effort: Pulmonary effort is normal.      Breath sounds: Normal breath sounds. Abdominal:      Palpations: Abdomen is soft. Tenderness: There is no abdominal tenderness. Skin:     General: Skin is warm and dry. Neurological:      Mental Status: She is alert and oriented to person, place, and time.    Psychiatric:         Behavior: Behavior normal.         ASSESSMENT and PLAN ICD-10-CM ICD-9-CM    1. Urine discoloration R39.89 791.9 URINALYSIS W/ RFLX MICROSCOPIC      CULTURE, URINE   2. Urine abnormality R82.90 791.9 URINALYSIS W/ RFLX MICROSCOPIC      CULTURE, URINE   3. Dysuria R30.0 788.1 AMB POC URINALYSIS DIP STICK AUTO W/O MICRO   4. Acute cystitis without hematuria N30.00 595.0 nitrofurantoin, macrocrystal-monohydrate, (Macrobid) 100 mg capsule   5. Chronic bilateral low back pain with bilateral sciatica M54.42 724.2 REFERRAL TO NEUROSURGERY    M54.41 724.3     G89.29 338.29    6. Abnormal MRI, lumbar spine R93.7 793.7 REFERRAL TO NEUROSURGERY   7. Left leg weakness R29.898 729.89 REFERRAL TO NEUROSURGERY   8. Hospital discharge follow-up Z09 V67.59    9. Nausea R11.0 787.02      Follow-up and Dispositions    · Return in about 3 months (around 9/23/2020) for follow up. Pt expressed understanding of visit summary and plans for any follow ups or referrals as well as any medications prescribed.

## 2020-06-24 NOTE — PATIENT INSTRUCTIONS
Dehydration: Care Instructions  Your Care Instructions  Dehydration happens when your body loses too much fluid. This might happen when you do not drink enough water or you lose large amounts of fluids from your body because of diarrhea, vomiting, or sweating. Severe dehydration can be life-threatening. Water and minerals called electrolytes help put your body fluids back in balance. Learn the early signs of fluid loss, and drink more fluids to prevent dehydration. Follow-up care is a key part of your treatment and safety. Be sure to make and go to all appointments, and call your doctor if you are having problems. It's also a good idea to know your test results and keep a list of the medicines you take. How can you care for yourself at home? · To prevent dehydration, drink plenty of fluids, enough so that your urine is light yellow or clear like water. Choose water and other caffeine-free clear liquids until you feel better. If you have kidney, heart, or liver disease and have to limit fluids, talk with your doctor before you increase the amount of fluids you drink. · If you do not feel like eating or drinking, try taking small sips of water, sports drinks, or other rehydration drinks. · Get plenty of rest.  To prevent dehydration  · Add more fluids to your diet and daily routine, unless your doctor has told you not to. · During hot weather, drink more fluids. Drink even more fluids if you exercise a lot. Stay away from drinks with alcohol or caffeine. · Watch for the symptoms of dehydration. These include:  ? A dry, sticky mouth. ? Dark yellow urine, and not much of it. ? Dry and sunken eyes. ? Feeling very tired. · Learn what problems can lead to dehydration. These include:  ? Diarrhea, fever, and vomiting. ? Any illness with a fever, such as pneumonia or the flu. ? Activities that cause heavy sweating, such as endurance races and heavy outdoor work in hot or humid weather. ?  Alcohol or drug use or problems caused by quitting their use (withdrawal). ? Certain medicines, such as cold and allergy pills (antihistamines), diet pills (diuretics), and laxatives. ? Certain diseases, such as diabetes, cancer, and heart or kidney disease. When should you call for help? [id number] anytime you think you may need emergency care. For example, call if:  · You passed out (lost consciousness). Call your doctor now or seek immediate medical care if:  · You are confused and cannot think clearly. · You are dizzy or lightheaded, or you feel like you may faint. · You have signs of needing more fluids. You have sunken eyes and a dry mouth, and you pass only a little dark urine. · You cannot keep fluids down. Watch closely for changes in your health, and be sure to contact your doctor if:  · You are not making tears. · Your skin is very dry and sags slowly back into place after you pinch it. · Your mouth and eyes are very dry. Where can you learn more? Go to http://juan alberto-marina.info/  Enter Q814 in the search box to learn more about \"Dehydration: Care Instructions. \"  Current as of: June 26, 2019               Content Version: 12.5  © 9509-7732 [employer], Incorporated. Care instructions adapted under license by [employer] (which disclaims liability or warranty for this information). If you have questions about a medical condition or this instruction, always ask your healthcare professional. [place] [address] any warranty or liability for your use of this information.

## 2020-06-26 ENCOUNTER — TELEPHONE (OUTPATIENT)
Dept: FAMILY MEDICINE CLINIC | Facility: CLINIC | Age: 75
End: 2020-06-26

## 2020-06-26 DIAGNOSIS — N30.00 ACUTE CYSTITIS WITHOUT HEMATURIA: Primary | ICD-10-CM

## 2020-06-26 LAB
BACTERIA SPEC CULT: ABNORMAL
CC UR VC: ABNORMAL
SERVICE CMNT-IMP: ABNORMAL

## 2020-06-26 RX ORDER — SULFAMETHOXAZOLE AND TRIMETHOPRIM 800; 160 MG/1; MG/1
1 TABLET ORAL 2 TIMES DAILY
Qty: 14 TAB | Refills: 0 | Status: SHIPPED | OUTPATIENT
Start: 2020-06-26 | End: 2020-07-03

## 2020-07-01 ENCOUNTER — TELEPHONE (OUTPATIENT)
Dept: FAMILY MEDICINE CLINIC | Facility: CLINIC | Age: 75
End: 2020-07-01

## 2020-07-01 NOTE — TELEPHONE ENCOUNTER
She will call and let us know if she changes her mind about bringing a urine into the office to check and make sure that her uti is resolving. [place]

## 2020-07-01 NOTE — TELEPHONE ENCOUNTER
Patient is calling to state that she started her medication for a UTI on Friday 06/26/20 and is still urinating about every hour.   She wants to know if this is normal.

## 2020-07-31 DIAGNOSIS — E78.00 HYPERCHOLESTEROLEMIA: ICD-10-CM

## 2020-08-03 RX ORDER — ROSUVASTATIN CALCIUM 40 MG/1
TABLET, COATED ORAL
Qty: 90 TAB | Refills: 1 | Status: SHIPPED | OUTPATIENT
Start: 2020-08-03 | End: 2021-01-07 | Stop reason: SDUPTHER

## 2020-08-11 ENCOUNTER — TELEPHONE (OUTPATIENT)
Dept: FAMILY MEDICINE CLINIC | Facility: CLINIC | Age: 75
End: 2020-08-11

## 2020-08-11 DIAGNOSIS — M81.0 AGE-RELATED OSTEOPOROSIS WITHOUT CURRENT PATHOLOGICAL FRACTURE: Primary | ICD-10-CM

## 2020-08-11 RX ORDER — IBANDRONATE SODIUM 150 MG/1
150 TABLET, FILM COATED ORAL
Qty: 3 TAB | Refills: 3 | Status: SHIPPED | OUTPATIENT
Start: 2020-08-11 | End: 2020-08-20 | Stop reason: SDUPTHER

## 2020-08-11 NOTE — TELEPHONE ENCOUNTER
Patient states that they no longer make the fosamax and she needs something else called in to the pharmacy

## 2020-08-11 NOTE — TELEPHONE ENCOUNTER
Called pt and left message. Call back number left and I myself or one of the other nurses will attempt to contact again.     The call was to inform pt med eduardo .

## 2020-08-20 DIAGNOSIS — M81.0 AGE-RELATED OSTEOPOROSIS WITHOUT CURRENT PATHOLOGICAL FRACTURE: ICD-10-CM

## 2020-08-20 NOTE — TELEPHONE ENCOUNTER
Requested Prescriptions     Pending Prescriptions Disp Refills    ibandronate (Boniva) 150 mg tablet 3 Tab 3     Sig: Take 150 mg by mouth every thirty (30) days. Please resend prescription to correct pharmacy. Should go to [facility].

## 2020-08-21 RX ORDER — IBANDRONATE SODIUM 150 MG/1
150 TABLET, FILM COATED ORAL
Qty: 3 TAB | Refills: 3 | Status: SHIPPED | OUTPATIENT
Start: 2020-08-21 | End: 2021-01-07 | Stop reason: SDUPTHER

## 2020-10-05 ENCOUNTER — VIRTUAL VISIT (OUTPATIENT)
Dept: FAMILY MEDICINE CLINIC | Age: 75
End: 2020-10-05
Payer: MEDICARE

## 2020-10-05 DIAGNOSIS — R29.898 LEFT LEG WEAKNESS: ICD-10-CM

## 2020-10-05 DIAGNOSIS — G89.29 CHRONIC BILATERAL LOW BACK PAIN WITH BILATERAL SCIATICA: ICD-10-CM

## 2020-10-05 DIAGNOSIS — F32.A MILD DEPRESSION: ICD-10-CM

## 2020-10-05 DIAGNOSIS — E78.00 HYPERCHOLESTEROLEMIA: Primary | ICD-10-CM

## 2020-10-05 DIAGNOSIS — M54.42 CHRONIC BILATERAL LOW BACK PAIN WITH BILATERAL SCIATICA: ICD-10-CM

## 2020-10-05 DIAGNOSIS — F03.90 DEMENTIA WITHOUT BEHAVIORAL DISTURBANCE, UNSPECIFIED DEMENTIA TYPE: ICD-10-CM

## 2020-10-05 DIAGNOSIS — M54.41 CHRONIC BILATERAL LOW BACK PAIN WITH BILATERAL SCIATICA: ICD-10-CM

## 2020-10-05 PROCEDURE — 1090F PRES/ABSN URINE INCON ASSESS: CPT | Performed by: PHYSICIAN ASSISTANT

## 2020-10-05 PROCEDURE — 99214 OFFICE O/P EST MOD 30 MIN: CPT | Performed by: PHYSICIAN ASSISTANT

## 2020-10-05 PROCEDURE — 1101F PT FALLS ASSESS-DOCD LE1/YR: CPT | Performed by: PHYSICIAN ASSISTANT

## 2020-10-05 PROCEDURE — G8399 PT W/DXA RESULTS DOCUMENT: HCPCS | Performed by: PHYSICIAN ASSISTANT

## 2020-10-05 PROCEDURE — G9717 DOC PT DX DEP/BP F/U NT REQ: HCPCS | Performed by: PHYSICIAN ASSISTANT

## 2020-10-05 PROCEDURE — G8756 NO BP MEASURE DOC: HCPCS | Performed by: PHYSICIAN ASSISTANT

## 2020-10-05 PROCEDURE — G8427 DOCREV CUR MEDS BY ELIG CLIN: HCPCS | Performed by: PHYSICIAN ASSISTANT

## 2020-10-05 PROCEDURE — 3017F COLORECTAL CA SCREEN DOC REV: CPT | Performed by: PHYSICIAN ASSISTANT

## 2020-10-05 RX ORDER — BUDESONIDE 3 MG/1
9 CAPSULE, COATED PELLETS ORAL DAILY
COMMUNITY

## 2020-10-22 DIAGNOSIS — E03.9 ACQUIRED HYPOTHYROIDISM: ICD-10-CM

## 2020-10-27 RX ORDER — ROPINIROLE 0.5 MG/1
0.5 TABLET, FILM COATED ORAL 3 TIMES DAILY
Qty: 270 TAB | Refills: 0 | Status: SHIPPED | OUTPATIENT
Start: 2020-10-27 | End: 2021-01-07 | Stop reason: SDUPTHER

## 2020-10-27 RX ORDER — LEVOTHYROXINE SODIUM 50 UG/1
50 TABLET ORAL
Qty: 90 TAB | Refills: 0 | Status: SHIPPED | OUTPATIENT
Start: 2020-10-27 | End: 2021-01-07 | Stop reason: SDUPTHER

## 2020-10-30 RX ORDER — ERGOCALCIFEROL 1.25 MG/1
50000 CAPSULE ORAL
COMMUNITY

## 2020-10-30 NOTE — PROGRESS NOTES
HISTORY OF PRESENT ILLNESS  Elio Coombs is a 70 y.o. female. HPI   Pt is being seen via doxy. me for f/u on her back pain. She has seen Dr Steve Lee and he ordered a nerve conduction study but pt has not heard from his office yet on that appt. She is doing well other than her back pain and her labs are utd and no refills are needed. She saw Dr Branden Davis and her B12 was increased and iron was added along with vit D. Allergies   Allergen Reactions    Erythromycin Rash    Gabapentin Other (comments)     Psych/hallucinations    Prednisone Hives     States can't take just prednisone but mixes are ok       Current Outpatient Medications   Medication Sig    ergocalciferol (ERGOCALCIFEROL) 1,250 mcg (50,000 unit) capsule Take 50,000 Units by mouth every seven (7) days.  levothyroxine (SYNTHROID) 50 mcg tablet Take 1 Tab by mouth Daily (before breakfast).  rOPINIRole (REQUIP) 0.5 mg tablet Take 1 Tab by mouth three (3) times daily for 90 days.  budesonide (ENTOCORT EC) 3 mg capsule Take 9 mg by mouth daily.  ibandronate (Boniva) 150 mg tablet Take 150 mg by mouth every thirty (30) days.  rosuvastatin (CRESTOR) 40 mg tablet TAKE 1 TABLET EVERY NIGHT    meclizine (ANTIVERT) 25 mg tablet Take  by mouth every eight (8) hours as needed for Dizziness.  montelukast (SINGULAIR) 10 mg tablet Take 1 Tab by mouth daily.  Omeprazole delayed release (PRILOSEC D/R) 20 mg tablet Take 1 Tab by mouth daily.  cyanocobalamin (VITAMIN B12) 1,000 mcg/mL injection 1 mL by IntraMUSCular route every fourteen (14) days.  vitamin e (E GEMS) 100 unit capsule Take  by mouth daily.  ferrous sulfate (IRON) 325 mg (65 mg iron) cpER Take  by mouth.  valbenazine (INGREZZA) 80 mg cap Take  by mouth.  magnesium oxide (MAG-OX) 400 mg tablet Take 400 mg by mouth.  lamoTRIgine (LAMICTAL) 200 mg tablet Take 200 mg by mouth two (2) times a day.  lurasidone (LATUDA) 60 mg tab tablet Take 80 mg by mouth.     doxepin (SINEQUAN) 25 mg capsule Take  by mouth nightly.  hydrOXYzine pamoate (VISTARIL) 25 mg capsule Take 25 mg by mouth nightly. No current facility-administered medications for this visit. Review of Systems   Constitutional: Negative. Negative for chills, fever and malaise/fatigue. HENT: Negative. Eyes: Negative. Respiratory: Negative. Negative for cough, shortness of breath and wheezing. Cardiovascular: Negative. Negative for chest pain, palpitations and leg swelling. Gastrointestinal: Positive for heartburn (controlled on meds). Negative for abdominal pain, blood in stool, constipation, diarrhea, melena, nausea and vomiting. Genitourinary: Negative. Negative for dysuria, frequency, hematuria and urgency. Musculoskeletal: Positive for back pain (chronic). Skin: Negative. Negative for itching and rash. Neurological: Negative for dizziness and headaches. Psychiatric/Behavioral: Positive for memory loss (pt has dementia but is doing well). Negative for depression. The patient is not nervous/anxious. Physical Exam  Constitutional:       General: She is not in acute distress. Appearance: She is well-developed. She is not diaphoretic. HENT:      Head: Normocephalic and atraumatic. Cardiovascular:      Rate and Rhythm: Normal rate and regular rhythm. Pulmonary:      Effort: Pulmonary effort is normal.      Breath sounds: Normal breath sounds. Abdominal:      Palpations: Abdomen is soft. Tenderness: There is no abdominal tenderness. Skin:     General: Skin is warm and dry. Neurological:      Mental Status: She is alert and oriented to person, place, and time. Psychiatric:         Behavior: Behavior normal.         ASSESSMENT and PLAN    ICD-10-CM ICD-9-CM    1. Hypercholesterolemia  E78.00 272.0    2. Dementia without behavioral disturbance, unspecified dementia type ([facility]  F03.90 294.20    3. Mild depression ([facility]  F32.0 311    4.  Chronic bilateral low back pain with bilateral sciatica  M54.42 724.2     M54.41 724.3     G89.29 338.29    5. Left leg weakness  R29.898 729.89      Follow-up and Dispositions    · Return in about 3 months (around 1/5/2021) for follow up. Pt expressed understanding of visit summary and plans for any follow ups or referrals as well as any medications prescribed. Seen by synchronous (real-time) audio-video technology via doxy. me on 10/05/2020    The patient is aware that this patient-initiated Telehealth encounter is a billable service, with coverage as determined by his or her insurance carrier. He/She is aware that they may receive a bill and has provided verbal consent to proceed: Yes        I was in the office while conducting this encounter.

## 2020-10-30 NOTE — PATIENT INSTRUCTIONS
Hypothyroidism: Care Instructions Your Care Instructions When you have hypothyroidism, your body doesn't make enough thyroid hormone. This hormone helps your body use energy. If your thyroid level is low, you may feel tired, be constipated, have an increase in your blood pressure, or have dry skin or memory problems. You may also get cold easily, even when it is warm. Women with low thyroid levels may have heavy menstrual periods. A blood test to find your thyroid-stimulating hormone (TSH) level is used to check for hypothyroidism. A high TSH level may mean that you have it. The treatment for hypothyroidism is thyroid hormone pills. You should start to feel better in 1 to 2 weeks. Most people need treatment for the rest of their lives. You will need regular visits with your doctor to make sure you are doing well and that you have the right dose of medicine. Follow-up care is a key part of your treatment and safety. Be sure to make and go to all appointments, and call your doctor if you are having problems. It's also a good idea to know your test results and keep a list of the medicines you take. How can you care for yourself at home? · Take your thyroid hormone medicine exactly as prescribed. Call your doctor if you think you are having a problem with your medicine. Most people do not have side effects if they take the right amount of medicine regularly. ? Take the medicine 30 minutes before breakfast, and do not take it with calcium, vitamins, or iron. ? Do not take extra doses of your thyroid medicine. It will not help you get better any faster, and it may cause side effects. ? If you forget to take a dose, do NOT take a double dose of medicine. Take your usual dose the next day. · Tell your doctor about all prescription, herbal, or over-the-counter products you take. · Take care of yourself. Eat a healthy diet, get enough sleep, and get regular exercise. When should you call for help? Call 911 anytime you think you may need emergency care. For example, call if: 
  · You passed out (lost consciousness).  
  · You have severe trouble breathing.  
  · You have a very slow heartbeat (less than 60 beats a minute).  
  · You have a low body temperature (95°F or below). Call your doctor now or seek immediate medical care if: 
  · You feel tired, sluggish, or weak.  
  · You have trouble remembering things or concentrating.  
  · You do not begin to feel better 2 weeks after starting your medicine. Watch closely for changes in your health, and be sure to contact your doctor if you have any problems. Where can you learn more? Go to http://juan alberto-marina.info/ Enter F873 in the search box to learn more about \"Hypothyroidism: Care Instructions. \" Current as of: March 31, 2020               Content Version: 12.6 © 7358-2110 [employer]. Care instructions adapted under license by [employer] (which disclaims liability or warranty for this information). If you have questions about a medical condition or this instruction, always ask your healthcare professional. [address] any warranty or liability for your use of this information.

## 2020-11-09 RX ORDER — OMEPRAZOLE 20 MG/1
CAPSULE, DELAYED RELEASE ORAL
Qty: 90 CAP | Refills: 0 | Status: SHIPPED | OUTPATIENT
Start: 2020-11-09 | End: 2021-01-07 | Stop reason: SDUPTHER

## 2021-01-07 ENCOUNTER — VIRTUAL VISIT (OUTPATIENT)
Dept: FAMILY MEDICINE CLINIC | Age: 76
End: 2021-01-07
Payer: MEDICARE

## 2021-01-07 DIAGNOSIS — Z00.00 ENCOUNTER FOR ANNUAL WELLNESS EXAM IN MEDICARE PATIENT: ICD-10-CM

## 2021-01-07 DIAGNOSIS — G25.81 RESTLESS LEG: Primary | ICD-10-CM

## 2021-01-07 DIAGNOSIS — E78.00 HYPERCHOLESTEROLEMIA: ICD-10-CM

## 2021-01-07 DIAGNOSIS — M81.0 AGE-RELATED OSTEOPOROSIS WITHOUT CURRENT PATHOLOGICAL FRACTURE: ICD-10-CM

## 2021-01-07 DIAGNOSIS — E03.9 ACQUIRED HYPOTHYROIDISM: ICD-10-CM

## 2021-01-07 DIAGNOSIS — F03.90 DEMENTIA WITHOUT BEHAVIORAL DISTURBANCE, UNSPECIFIED DEMENTIA TYPE: ICD-10-CM

## 2021-01-07 DIAGNOSIS — K21.9 GASTROESOPHAGEAL REFLUX DISEASE WITHOUT ESOPHAGITIS: ICD-10-CM

## 2021-01-07 PROCEDURE — G8419 CALC BMI OUT NRM PARAM NOF/U: HCPCS | Performed by: PHYSICIAN ASSISTANT

## 2021-01-07 PROCEDURE — 1101F PT FALLS ASSESS-DOCD LE1/YR: CPT | Performed by: PHYSICIAN ASSISTANT

## 2021-01-07 PROCEDURE — 99214 OFFICE O/P EST MOD 30 MIN: CPT | Performed by: PHYSICIAN ASSISTANT

## 2021-01-07 PROCEDURE — G0439 PPPS, SUBSEQ VISIT: HCPCS | Performed by: PHYSICIAN ASSISTANT

## 2021-01-07 PROCEDURE — 3017F COLORECTAL CA SCREEN DOC REV: CPT | Performed by: PHYSICIAN ASSISTANT

## 2021-01-07 PROCEDURE — G9717 DOC PT DX DEP/BP F/U NT REQ: HCPCS | Performed by: PHYSICIAN ASSISTANT

## 2021-01-07 PROCEDURE — G8756 NO BP MEASURE DOC: HCPCS | Performed by: PHYSICIAN ASSISTANT

## 2021-01-07 PROCEDURE — G8427 DOCREV CUR MEDS BY ELIG CLIN: HCPCS | Performed by: PHYSICIAN ASSISTANT

## 2021-01-07 PROCEDURE — 1090F PRES/ABSN URINE INCON ASSESS: CPT | Performed by: PHYSICIAN ASSISTANT

## 2021-01-07 PROCEDURE — G8536 NO DOC ELDER MAL SCRN: HCPCS | Performed by: PHYSICIAN ASSISTANT

## 2021-01-07 RX ORDER — ROSUVASTATIN CALCIUM 40 MG/1
TABLET, COATED ORAL
Qty: 90 TAB | Refills: 1 | Status: SHIPPED | OUTPATIENT
Start: 2021-01-07 | End: 2021-05-13

## 2021-01-07 RX ORDER — LEVOTHYROXINE SODIUM 50 UG/1
50 TABLET ORAL
Qty: 90 TAB | Refills: 1 | Status: SHIPPED | OUTPATIENT
Start: 2021-01-07 | End: 2021-05-13

## 2021-01-07 RX ORDER — IBANDRONATE SODIUM 150 MG/1
150 TABLET, FILM COATED ORAL
Qty: 3 TAB | Refills: 3 | Status: SHIPPED | OUTPATIENT
Start: 2021-01-07 | End: 2021-09-16 | Stop reason: SDUPTHER

## 2021-01-07 RX ORDER — ROPINIROLE 0.5 MG/1
0.5 TABLET, FILM COATED ORAL 3 TIMES DAILY
Qty: 270 TAB | Refills: 1 | Status: SHIPPED | OUTPATIENT
Start: 2021-01-07 | End: 2021-07-12

## 2021-01-07 RX ORDER — PHENOL/SODIUM PHENOLATE
20 AEROSOL, SPRAY (ML) MUCOUS MEMBRANE DAILY
Qty: 90 TAB | Refills: 1 | Status: SHIPPED | OUTPATIENT
Start: 2021-01-07 | End: 2021-01-13 | Stop reason: SDUPTHER

## 2021-01-13 DIAGNOSIS — K21.9 GASTROESOPHAGEAL REFLUX DISEASE WITHOUT ESOPHAGITIS: ICD-10-CM

## 2021-01-13 NOTE — TELEPHONE ENCOUNTER
Patient states Ms. Bruno called in the incorrect medication. Patient is requesting a refill on her medication.

## 2021-01-14 RX ORDER — PHENOL/SODIUM PHENOLATE
20 AEROSOL, SPRAY (ML) MUCOUS MEMBRANE DAILY
Qty: 90 TAB | Refills: 1 | Status: SHIPPED | OUTPATIENT
Start: 2021-01-14 | End: 2021-09-16 | Stop reason: SDUPTHER

## 2021-01-15 LAB
25(OH)D3 SERPL-MCNC: 42 NG/ML (ref 30–100)
ALB/GLOBRATIO, 58C: 1.8 (CALC) (ref 1–2.5)
ALBUMIN SERPL-MCNC: 3.9 G/DL (ref 3.6–5.1)
ALKALINE PHOSPHATASE, TOTAL, 25002000: 89 U/L (ref 37–153)
ALT SERPL-CCNC: 12 U/L (ref 6–29)
AST SERPL W P-5'-P-CCNC: 16 U/L (ref 10–35)
BASOPHILS # BLD: 39 CELLS/UL (ref 0–200)
BASOPHILS NFR BLD: 0.8 %
BILIRUB SERPL-MCNC: 0.5 MG/DL (ref 0.2–1.2)
BUN SERPL-MCNC: 25 MG/DL (ref 7–25)
BUN/CREATININE RATIO,BUCR: 21 (CALC) (ref 6–22)
CALCIUM SERPL-MCNC: 9.2 MG/DL (ref 8.6–10.4)
CHLORIDE SERPL-SCNC: 105 MMOL/L (ref 98–110)
CHOL/HDL RATIO,CHHDX: 1.8 (CALC)
CHOLEST SERPL-MCNC: 122 MG/DL
CO2 SERPL-SCNC: 32 MMOL/L (ref 20–32)
CREAT SERPL-MCNC: 1.21 MG/DL (ref 0.6–0.93)
EOSINOPHIL # BLD: 118 CELLS/UL (ref 15–500)
EOSINOPHIL NFR BLD: 2.4 %
ERYTHROCYTE [DISTWIDTH] IN BLOOD BY AUTOMATED COUNT: 12.3 % (ref 11–15)
GLOBULIN,GLOB: 2.2 G/DL (CALC) (ref 1.9–3.7)
GLUCOSE SERPL-MCNC: 85 MG/DL (ref 65–99)
HCT VFR BLD AUTO: 40 % (ref 35–45)
HDLC SERPL-MCNC: 68 MG/DL
HGB BLD-MCNC: 13.2 G/DL (ref 11.7–15.5)
LDL-CHOLESTEROL: 40 MG/DL (CALC)
LYMPHOCYTES # BLD: 946 CELLS/UL (ref 850–3900)
LYMPHOCYTES NFR BLD: 19.3 %
MCH RBC QN AUTO: 32.2 PG (ref 27–33)
MCHC RBC AUTO-ENTMCNC: 33 G/DL (ref 32–36)
MCV RBC AUTO: 97.6 FL (ref 80–100)
MONOCYTES # BLD: 368 CELLS/UL (ref 200–950)
MONOCYTES NFR BLD: 7.5 %
NEUTROPHILS # BLD AUTO: 3430 CELLS/UL (ref 1500–7800)
NEUTROPHILS # BLD: 70 %
NON-HDL CHOLESTEROL, 011976: 54 MG/DL (CALC)
PLATELET # BLD AUTO: 207 THOUSAND/UL (ref 140–400)
PMV BLD AUTO: 11.7 FL (ref 7.5–12.5)
POTASSIUM SERPL-SCNC: 4.5 MMOL/L (ref 3.5–5.3)
PROT SERPL-MCNC: 6.1 G/DL (ref 6.1–8.1)
RBC # BLD AUTO: 4.1 MILLION/UL (ref 3.8–5.1)
SODIUM SERPL-SCNC: 142 MMOL/L (ref 135–146)
T4 FREE SERPL-MCNC: 1.2 NG/DL (ref 0.8–1.8)
TRIGL SERPL-MCNC: 59 MG/DL (ref ?–150)
TSH SERPL DL<=0.005 MIU/L-ACNC: 1.26 MIU/L (ref 0.4–4.5)
WBC # BLD AUTO: 4.9 THOUSAND/UL (ref 3.8–10.8)

## 2021-01-22 ENCOUNTER — TELEPHONE (OUTPATIENT)
Dept: FAMILY MEDICINE CLINIC | Age: 76
End: 2021-01-22

## 2021-01-25 NOTE — TELEPHONE ENCOUNTER
Please advise pt her Creat was elevated but it was improved from the last 2.  Otherwise her other labs were normal.

## 2021-02-07 NOTE — PATIENT INSTRUCTIONS
Schedule of Personalized Health Plan (Provide Copy to Patient) The best way to stay healthy is to live a healthy lifestyle. A healthy lifestyle includes regular exercise, eating a well-balanced diet, keeping a healthy weight and not smoking. Regular physical exams and screening tests are another important way to take care of yourself. Preventive exams provided by health care providers can find health problems early when treatment works best and can keep you from getting certain diseases or illnesses. Preventive services include exams, lab tests, screenings, shots, monitoring and information to help you take care of your own health. All people over 65 should have a pneumonia shot. Pneumonia shots are usually only needed once in a lifetime unless your doctor decides differently. All people over 65 should have a yearly flu shot. People over 65 are at medium to high risk for Hepatitis B. Three shots are needed for complete protection. In addition to your physical exam, some screening tests are recommended: 
 
 
Screening for Breast Cancer is recommended yearly with a mammogram. 
 
Screening for Cervical Cancer is recommended every two years (annually for certain risk factors, such as previous history of STD or abnormal PAP in past 7 years), with a Pelvic Exam with PAP 
 
 Here is a list of your current Health Maintenance items with a due date: 
Health Maintenance Topic Date Due  
 Hepatitis C Screening  1945  COVID-19 Vaccine (1 of 2) 03/27/1963  DTaP/Tdap/Td series (1 - Tdap) 03/27/1968  Shingrix Vaccine Age 50> (1 of 2) 03/27/1997  Breast Cancer Screen Mammogram  03/27/1997  GLAUCOMA SCREENING Q2Y  03/27/2012  Pneumococcal 65+ years (1 of 1 - PPSV23) 03/27/2012  Flu Vaccine (1) 09/01/2020  Medicare Yearly Exam  01/08/2022  Lipid Screen  01/12/2022  Colorectal Cancer Screening Combo  06/08/2030  Bone Densitometry (Dexa) Screening  Completed

## 2021-02-07 NOTE — PROGRESS NOTES
HISTORY OF PRESENT ILLNESS  Josette Smith is a 70 y.o. female. HPI   Pt is being seen via doxy. me for f/u on her back pain. She saw Dr Sera Huerta and states he wanted further testing but she has not heard form him regarding that. Will try to get office notes to see what was discussed. She needs a refill on her B12, Crestor, prilosec, requip, synthroid, and boniva. She is due for labs and will come into office next week for them. She is also due for her medicare wellness exam.     Allergies   Allergen Reactions    Erythromycin Rash    Gabapentin Other (comments)     Psych/hallucinations    Prednisone Hives     States can't take just prednisone but mixes are ok       Current Outpatient Medications   Medication Sig    levothyroxine (SYNTHROID) 50 mcg tablet Take 1 Tab by mouth Daily (before breakfast).  ibandronate (Boniva) 150 mg tablet Take 150 mg by mouth every thirty (30) days.  rosuvastatin (CRESTOR) 40 mg tablet TAKE 1 TABLET EVERY NIGHT    rOPINIRole (REQUIP) 0.5 mg tablet Take 1 Tab by mouth three (3) times daily for 180 days.  Omeprazole delayed release (PRILOSEC D/R) 20 mg tablet Take 1 Tab by mouth daily.  ergocalciferol (ERGOCALCIFEROL) 1,250 mcg (50,000 unit) capsule Take 50,000 Units by mouth every seven (7) days.  budesonide (ENTOCORT EC) 3 mg capsule Take 9 mg by mouth daily.  montelukast (SINGULAIR) 10 mg tablet Take 1 Tab by mouth daily.  cyanocobalamin (VITAMIN B12) 1,000 mcg/mL injection 1 mL by IntraMUSCular route every fourteen (14) days.  vitamin e (E GEMS) 100 unit capsule Take  by mouth daily.  ferrous sulfate (IRON) 325 mg (65 mg iron) cpER Take  by mouth.  valbenazine (INGREZZA) 80 mg cap Take  by mouth.  magnesium oxide (MAG-OX) 400 mg tablet Take 400 mg by mouth.  lamoTRIgine (LAMICTAL) 200 mg tablet Take 200 mg by mouth two (2) times a day.  lurasidone (LATUDA) 60 mg tab tablet Take 80 mg by mouth.     doxepin (SINEQUAN) 25 mg capsule Take  by mouth nightly.  hydrOXYzine pamoate (VISTARIL) 25 mg capsule Take 25 mg by mouth nightly. No current facility-administered medications for this visit. Review of Systems   Constitutional: Negative. Negative for chills, fever and malaise/fatigue. HENT: Negative. Eyes: Negative. Respiratory: Negative. Negative for cough, shortness of breath and wheezing. Cardiovascular: Negative. Negative for chest pain, palpitations and leg swelling. Gastrointestinal: Positive for heartburn (controlled on meds). Negative for abdominal pain, blood in stool, constipation, diarrhea, melena, nausea and vomiting. Genitourinary: Negative. Negative for dysuria, frequency, hematuria and urgency. Musculoskeletal: Positive for back pain (chronic). Skin: Negative. Negative for itching and rash. Neurological: Negative for dizziness and headaches. Psychiatric/Behavioral: Positive for memory loss (pt has dementia but is doing well). Negative for depression. The patient is not nervous/anxious. Physical Exam  Constitutional:       General: She is not in acute distress. Appearance: Normal appearance. She is well-developed. She is not ill-appearing or diaphoretic. HENT:      Head: Normocephalic and atraumatic. Pulmonary:      Effort: No respiratory distress. Skin:     General: Skin is warm and dry. Neurological:      Mental Status: She is alert and oriented to person, place, and time. Psychiatric:         Behavior: Behavior normal.         ASSESSMENT and PLAN    ICD-10-CM ICD-9-CM    1. Restless leg  G25.81 333.94 rOPINIRole (REQUIP) 0.5 mg tablet      CBC WITH AUTOMATED DIFF   2. Acquired hypothyroidism  E03.9 244.9 levothyroxine (SYNTHROID) 50 mcg tablet      TSH 3RD GENERATION      T4, FREE   3. Age-related osteoporosis without current pathological fracture  M81.0 733.01 ibandronate (Boniva) 150 mg tablet   4.  Gastroesophageal reflux disease without esophagitis  P78.0 044.10 METABOLIC PANEL, COMPREHENSIVE      URINALYSIS W/ RFLX MICROSCOPIC      DISCONTINUED: Omeprazole delayed release (PRILOSEC D/R) 20 mg tablet   5. Hypercholesterolemia  E78.00 272.0 rosuvastatin (CRESTOR) 40 mg tablet      METABOLIC PANEL, COMPREHENSIVE      LIPID PANEL   6. Dementia without behavioral disturbance, unspecified dementia type ([facility]  F03.90 294.20 VITAMIN D, 25 HYDROXY   7. Encounter for annual wellness exam in Medicare patient  Z00.00 V70.0      Follow-up and Dispositions    · Return in about 3 months (around 2021) for follow up. Pt expressed understanding of visit summary and plans for any follow ups or referrals as well as any medications prescribed. Seen by synchronous (real-time) audio-video technology via doxy. me on 2021    The patient is aware that this patient-initiated Telehealth encounter is a billable service, with coverage as determined by his or her insurance carrier. He/She is aware that they may receive a bill and has provided verbal consent to proceed: Yes        I was in the office while conducting this encounter. Medicare Wellness Visit  Josette Smith is a 70 y.o. female and presents for annual Medicare Wellness Visit. Problem List: Reviewed with patient and discussed risk factors.     Patient Active Problem List   Diagnosis Code    Acquired hypothyroidism E03.9    Dementia without behavioral disturbance (HCC) F03.90    Mild depression ([facility] F32.0    Simple chronic bronchitis ([facility] [address] J41.0    Essential hypertension I10    CKD (chronic kidney disease) stage 3, GFR 30-59 ml/min N18.30       Current medical providers:  Patient Care Team:  Susana Segura PA-C as PCP - General (Physician Assistant)  Susana Segura PA-C as PCP - [facility]    Adry Atkinson: Reviewed with patient  Past Surgical History:   Procedure Laterality Date    HX GI      Gastric Bypass X3, hemorrhoidectomy    HX GYN      Hysterectomy, , tubal ligation     HX HEENT cataract 2015    HX ORTHOPAEDIC      Laminectomy 2004 and 2015, meniscus Left 2015, TKR right 2012, Left 2016        SH: Reviewed with patient  Social History     Tobacco Use    Smoking status: Never Smoker    Smokeless tobacco: Never Used   Substance Use Topics    Alcohol use: No    Drug use: No       FH: Reviewed with patient  Family History   Problem Relation Age of Onset    Hypertension Mother     Hypertension Father     Lung Disease Father     Cancer Brother        Medications/Allergies: Reviewed with patient  Current Outpatient Medications on File Prior to Visit   Medication Sig Dispense Refill    ergocalciferol (ERGOCALCIFEROL) 1,250 mcg (50,000 unit) capsule Take 50,000 Units by mouth every seven (7) days.  budesonide (ENTOCORT EC) 3 mg capsule Take 9 mg by mouth daily.  montelukast (SINGULAIR) 10 mg tablet Take 1 Tab by mouth daily. 30 Tab 5    cyanocobalamin (VITAMIN B12) 1,000 mcg/mL injection 1 mL by IntraMUSCular route every fourteen (14) days. 10 mL 1    vitamin e (E GEMS) 100 unit capsule Take  by mouth daily.  ferrous sulfate (IRON) 325 mg (65 mg iron) cpER Take  by mouth.  valbenazine (INGREZZA) 80 mg cap Take  by mouth.  magnesium oxide (MAG-OX) 400 mg tablet Take 400 mg by mouth.  lamoTRIgine (LAMICTAL) 200 mg tablet Take 200 mg by mouth two (2) times a day.  lurasidone (LATUDA) 60 mg tab tablet Take 80 mg by mouth.  doxepin (SINEQUAN) 25 mg capsule Take  by mouth nightly.  hydrOXYzine pamoate (VISTARIL) 25 mg capsule Take 25 mg by mouth nightly. No current facility-administered medications on file prior to visit. Allergies   Allergen Reactions    Erythromycin Rash    Gabapentin Other (comments)     Psych/hallucinations    Prednisone Hives     States can't take just prednisone but mixes are ok       Objective: There were no vitals taken for this visit. There is no height or weight on file to calculate BMI.     Assessment of cognitive impairment: Alert and oriented x 3    Depression Screen:   3 most recent PHQ Screens 8/28/2018   PHQ Not Done Active Diagnosis of Depression or Bipolar Disorder       Fall Risk Assessment:    Fall Risk Assessment, last 12 mths 2/7/2021   Able to walk? Yes   Fall in past 12 months? 0   Do you feel unsteady? 1   Are you worried about falling 1   Is TUG test greater than 12 seconds? 0   Is the gait abnormal? 0   Number of falls in past 12 months 0   Fall with injury? -       Functional Ability:   Does the patient exhibit a steady gait? yes   How long did it take the patient to get up and walk from a sitting position? 10-20sec   Is the patient self reliant?  (ie can do own laundry, meals, household chores)  yes     Does the patient handle his/her own medications? yes     Does the patient handle his/her own money? yes     Is the patients home safe (ie good lighting, handrails on stairs and bath, etc.)? yes     Did you notice or did patient express any hearing difficulties? no     Did you notice or did patient express any vision difficulties?   no     Were distance and reading eye charts used? No - seen virtually but has ophthalmologist       Advance Care Planning:   Patient was offered the opportunity to discuss advance care planning:  yes     Does patient have an Advance Directive:  yes   If no, did you provide information on Caring Connections?   N/A       Plan:      Orders Placed This Encounter    METABOLIC PANEL, COMPREHENSIVE    TSH 3RD GENERATION    T4, FREE    CBC WITH AUTOMATED DIFF    LIPID PANEL    VITAMIN D, 25 HYDROXY    URINALYSIS W/ RFLX MICROSCOPIC    levothyroxine (SYNTHROID) 50 mcg tablet    ibandronate (Boniva) 150 mg tablet    DISCONTD: Omeprazole delayed release (PRILOSEC D/R) 20 mg tablet    rosuvastatin (CRESTOR) 40 mg tablet    rOPINIRole (REQUIP) 0.5 mg tablet       Health Maintenance   Topic Date Due    Hepatitis C Screening  1945    COVID-19 Vaccine (1 of 2) 03/27/1963    DTaP/Tdap/Td series (1 - Tdap) 03/27/1968    Shingrix Vaccine Age 50> (1 of 2) 03/27/1997    Breast Cancer Screen Mammogram  03/27/1997    GLAUCOMA SCREENING Q2Y  03/27/2012    Pneumococcal 65+ years (1 of 1 - PPSV23) 03/27/2012    Flu Vaccine (1) 09/01/2020    Medicare Yearly Exam  01/08/2022    Lipid Screen  01/12/2022    Colorectal Cancer Screening Combo  06/08/2030    Bone Densitometry (Dexa) Screening  Completed       *Patient verbalized understanding and agreement with the plan. A copy of the After Visit Summary with personalized health plan was given to the patient today.

## 2021-02-15 ENCOUNTER — TELEPHONE (OUTPATIENT)
Dept: FAMILY MEDICINE CLINIC | Age: 76
End: 2021-02-15

## 2021-02-15 DIAGNOSIS — R60.0 EDEMA, LOWER EXTREMITY: Primary | ICD-10-CM

## 2021-02-15 NOTE — TELEPHONE ENCOUNTER
Patient called reporting swelling in left ankle, BP of 150/84, 93%, temp of 97.9. She is out of lasix and does not know if she needs more.

## 2021-02-15 NOTE — TELEPHONE ENCOUNTER
Patient states that she is feeling fine but thinks she needs some lasix for the tightness in the ankle

## 2021-02-16 RX ORDER — FUROSEMIDE 20 MG/1
20 TABLET ORAL DAILY
Qty: 30 TAB | Refills: 0 | Status: SHIPPED | OUTPATIENT
Start: 2021-02-16 | End: 2021-03-25 | Stop reason: SDUPTHER

## 2021-03-25 ENCOUNTER — OFFICE VISIT (OUTPATIENT)
Dept: FAMILY MEDICINE CLINIC | Age: 76
End: 2021-03-25
Payer: MEDICARE

## 2021-03-25 VITALS
RESPIRATION RATE: 17 BRPM | HEART RATE: 72 BPM | DIASTOLIC BLOOD PRESSURE: 74 MMHG | BODY MASS INDEX: 31.86 KG/M2 | TEMPERATURE: 97 F | OXYGEN SATURATION: 98 % | WEIGHT: 203 LBS | HEIGHT: 67 IN | SYSTOLIC BLOOD PRESSURE: 123 MMHG

## 2021-03-25 DIAGNOSIS — R60.0 EDEMA, LOWER EXTREMITY: ICD-10-CM

## 2021-03-25 DIAGNOSIS — J41.0 SIMPLE CHRONIC BRONCHITIS (HCC): ICD-10-CM

## 2021-03-25 DIAGNOSIS — R60.0 LOWER EXTREMITY EDEMA: ICD-10-CM

## 2021-03-25 DIAGNOSIS — S69.92XA INJURY OF FINGER OF LEFT HAND, INITIAL ENCOUNTER: ICD-10-CM

## 2021-03-25 DIAGNOSIS — L30.9 DERMATITIS: ICD-10-CM

## 2021-03-25 DIAGNOSIS — F32.A MILD DEPRESSION: ICD-10-CM

## 2021-03-25 DIAGNOSIS — S69.92XA INJURY OF FINGER OF LEFT HAND, INITIAL ENCOUNTER: Primary | ICD-10-CM

## 2021-03-25 PROCEDURE — 3017F COLORECTAL CA SCREEN DOC REV: CPT | Performed by: PHYSICIAN ASSISTANT

## 2021-03-25 PROCEDURE — 1101F PT FALLS ASSESS-DOCD LE1/YR: CPT | Performed by: PHYSICIAN ASSISTANT

## 2021-03-25 PROCEDURE — G8427 DOCREV CUR MEDS BY ELIG CLIN: HCPCS | Performed by: PHYSICIAN ASSISTANT

## 2021-03-25 PROCEDURE — 99214 OFFICE O/P EST MOD 30 MIN: CPT | Performed by: PHYSICIAN ASSISTANT

## 2021-03-25 PROCEDURE — G8754 DIAS BP LESS 90: HCPCS | Performed by: PHYSICIAN ASSISTANT

## 2021-03-25 PROCEDURE — 1090F PRES/ABSN URINE INCON ASSESS: CPT | Performed by: PHYSICIAN ASSISTANT

## 2021-03-25 PROCEDURE — G8399 PT W/DXA RESULTS DOCUMENT: HCPCS | Performed by: PHYSICIAN ASSISTANT

## 2021-03-25 PROCEDURE — G8536 NO DOC ELDER MAL SCRN: HCPCS | Performed by: PHYSICIAN ASSISTANT

## 2021-03-25 PROCEDURE — G9717 DOC PT DX DEP/BP F/U NT REQ: HCPCS | Performed by: PHYSICIAN ASSISTANT

## 2021-03-25 PROCEDURE — G8752 SYS BP LESS 140: HCPCS | Performed by: PHYSICIAN ASSISTANT

## 2021-03-25 PROCEDURE — G8419 CALC BMI OUT NRM PARAM NOF/U: HCPCS | Performed by: PHYSICIAN ASSISTANT

## 2021-03-25 RX ORDER — CLOTRIMAZOLE AND BETAMETHASONE DIPROPIONATE 10; .64 MG/G; MG/G
CREAM TOPICAL
Qty: 15 G | Refills: 1 | Status: SHIPPED | OUTPATIENT
Start: 2021-03-25 | End: 2021-09-16 | Stop reason: ALTCHOICE

## 2021-03-25 RX ORDER — FUROSEMIDE 20 MG/1
20 TABLET ORAL DAILY
Qty: 30 TAB | Refills: 1 | Status: SHIPPED | OUTPATIENT
Start: 2021-03-25 | End: 2021-05-21 | Stop reason: SDUPTHER

## 2021-04-09 NOTE — PROGRESS NOTES
HISTORY OF PRESENT ILLNESS  Jamarcus Esposito is a 78 y.o. female. HPI   Pt is being seen with her daughter today for pain in her left 5th finger w swelling and deviation at the joint. She apparently jammed her finger a week ago and it has been tender since. They tried to splint it but it was painful and they were concerned it did not fit right. Will order xray or advised pts daughter they could try ortho now if they felt she needed to be seen immed. They will likely go to [facility] but will provide them with xray order incase. She also has had swelling in her lower legs for the past few days. She has not had lasix bc it was stopped at the hospital. She was dehydrated at that time and her BP was also low. Will restart but advised pt and daughter the importance of drinking enough water as well as elevated legs which both pt and daughter admit pt is not doing. She has a hx of PVD so will also refer her to vascular. Discussed compression stockings as well and they will try those. Allergies   Allergen Reactions    Erythromycin Rash    Gabapentin Other (comments)     Psych/hallucinations    Prednisone Hives     States can't take just prednisone but mixes are ok       Current Outpatient Medications   Medication Sig    KRILL OIL PO Take  by mouth.  furosemide (LASIX) 20 mg tablet Take 1 Tab by mouth daily.  clotrimazole-betamethasone (LOTRISONE) topical cream Apply twice a day to rash    Omeprazole delayed release (PRILOSEC D/R) 20 mg tablet Take 1 Tab by mouth daily.  levothyroxine (SYNTHROID) 50 mcg tablet Take 1 Tab by mouth Daily (before breakfast).  ibandronate (Boniva) 150 mg tablet Take 150 mg by mouth every thirty (30) days.  rosuvastatin (CRESTOR) 40 mg tablet TAKE 1 TABLET EVERY NIGHT    rOPINIRole (REQUIP) 0.5 mg tablet Take 1 Tab by mouth three (3) times daily for 180 days.  ergocalciferol (ERGOCALCIFEROL) 1,250 mcg (50,000 unit) capsule Take 50,000 Units by mouth every seven (7) days.  budesonide (ENTOCORT EC) 3 mg capsule Take 9 mg by mouth daily.  cyanocobalamin (VITAMIN B12) 1,000 mcg/mL injection 1 mL by IntraMUSCular route every fourteen (14) days.  vitamin e (E GEMS) 100 unit capsule Take  by mouth daily.  ferrous sulfate (IRON) 325 mg (65 mg iron) cpER Take  by mouth.  valbenazine (INGREZZA) 80 mg cap Take  by mouth.  magnesium oxide (MAG-OX) 400 mg tablet Take 400 mg by mouth.  lamoTRIgine (LAMICTAL) 200 mg tablet Take 200 mg by mouth two (2) times a day.  lurasidone (LATUDA) 60 mg tab tablet Take 80 mg by mouth.  doxepin (SINEQUAN) 25 mg capsule Take  by mouth nightly.  hydrOXYzine pamoate (VISTARIL) 25 mg capsule Take 25 mg by mouth nightly.  montelukast (SINGULAIR) 10 mg tablet Take 1 Tab by mouth daily. No current facility-administered medications for this visit. Review of Systems   Constitutional: Negative. Negative for chills, fever and malaise/fatigue. HENT: Negative. Eyes: Negative. Respiratory: Negative. Negative for cough, shortness of breath and wheezing. Cardiovascular: Positive for leg swelling (bilateral). Negative for chest pain and palpitations. Gastrointestinal: Positive for heartburn (controlled on meds). Negative for abdominal pain, blood in stool, constipation, diarrhea, melena, nausea and vomiting. Genitourinary: Negative. Negative for dysuria, frequency, hematuria and urgency. Musculoskeletal: Positive for back pain (chronic) and joint pain (left 5th finger). Skin: Positive for rash. Negative for itching. Neurological: Negative for dizziness and headaches. Psychiatric/Behavioral: Positive for memory loss (pt has dementia). Negative for depression. The patient is not nervous/anxious.       Visit Vitals  /74   Pulse 72   Temp 97 °F (36.1 °C) (Temporal)   Resp 17   Ht 5' 7\" (1.702 m)   Wt 203 lb (92.1 kg)   SpO2 98%   BMI 31.79 kg/m²       Physical Exam  Constitutional:       General: She is not in acute distress. Appearance: Normal appearance. She is well-developed. She is not ill-appearing or diaphoretic. HENT:      Head: Normocephalic and atraumatic. Cardiovascular:      Rate and Rhythm: Normal rate and regular rhythm. Pulses: Normal pulses. Heart sounds: Normal heart sounds. Pulmonary:      Effort: Pulmonary effort is normal. No respiratory distress. Breath sounds: Normal breath sounds. Musculoskeletal:      Left hand: She exhibits decreased range of motion (5th finger), tenderness (5th finger) and deformity (5th DIP). Normal sensation noted. Hands:       Right lower le+ Edema present. Left lower le+ Edema present. Skin:     General: Skin is warm and dry. Neurological:      Mental Status: She is alert and oriented to person, place, and time. Psychiatric:         Behavior: Behavior normal.         ASSESSMENT and PLAN    ICD-10-CM ICD-9-CM    1. Injury of finger of left hand, initial encounter  S69. 92XA 959.5 XR 5TH FINGER LT MIN 2 V   2. Lower extremity edema  R60.0 782.3 REFERRAL TO VASCULAR CENTER   3. Edema, lower extremity  R60.0 782.3 furosemide (LASIX) 20 mg tablet   4. Dermatitis  L30.9 692.9 clotrimazole-betamethasone (LOTRISONE) topical cream   5. Simple chronic bronchitis (HCC)  J41.0 491.0    6. Mild depression ([address]  F32.0 311      Follow-up and Dispositions    · Return in about 3 months (around 2021) for follow up. Pt expressed understanding of visit summary and plans for any follow ups or referrals as well as any medications prescribed.

## 2021-04-09 NOTE — PATIENT INSTRUCTIONS
Hypothyroidism: Care Instructions  Your Care Instructions     When you have hypothyroidism, your body doesn't make enough thyroid hormone. This hormone helps your body use energy. If your thyroid level is low, you may feel tired, be constipated, have an increase in your blood pressure, or have dry skin or memory problems. You may also get cold easily, even when it is warm. Women with low thyroid levels may have heavy menstrual periods. A blood test to find your thyroid-stimulating hormone (TSH) level is used to check for hypothyroidism. A high TSH level may mean that you have it. The treatment for hypothyroidism is thyroid hormone pills. You should start to feel better in 1 to 2 weeks. Most people need treatment for the rest of their lives. You will need regular visits with your doctor to make sure you are doing well and that you have the right dose of medicine. Follow-up care is a key part of your treatment and safety. Be sure to make and go to all appointments, and call your doctor if you are having problems. It's also a good idea to know your test results and keep a list of the medicines you take. How can you care for yourself at home? · Take your thyroid hormone medicine exactly as prescribed. Call your doctor if you think you are having a problem with your medicine. Most people do not have side effects if they take the right amount of medicine regularly. ? Take the medicine 30 minutes before breakfast, and do not take it with calcium, vitamins, or iron. ? Do not take extra doses of your thyroid medicine. It will not help you get better any faster, and it may cause side effects. ? If you forget to take a dose, do NOT take a double dose of medicine. Take your usual dose the next day. · Tell your doctor about all prescription, herbal, or over-the-counter products you take. · Take care of yourself. Eat a healthy diet, get enough sleep, and get regular exercise. When should you call for help?    Call 911 anytime you think you may need emergency care. For example, call if:    · You passed out (lost consciousness).     · You have severe trouble breathing.     · You have a very slow heartbeat (less than 60 beats a minute).     · You have a low body temperature (95°F or below). Call your doctor now or seek immediate medical care if:    · You feel tired, sluggish, or weak.     · You have trouble remembering things or concentrating.     · You do not begin to feel better 2 weeks after starting your medicine. Watch closely for changes in your health, and be sure to contact your doctor if you have any problems. Where can you learn more? Go to http://www.gray.com/  Enter A380 in the search box to learn more about \"Hypothyroidism: Care Instructions. \"  Current as of: March 31, 2020               Content Version: 12.8  © 6018-3525 [employer]. Care instructions adapted under license by [employer] (which disclaims liability or warranty for this information). If you have questions about a medical condition or this instruction, always ask your healthcare professional. [address] any warranty or liability for your use of this information.

## 2021-04-25 NOTE — PROGRESS NOTES
PHYSICAL THERAPY - DAILY TREATMENT NOTE    Patient Name: Orquidea Henley        Date: 2019  : 1945   YES Patient  Verified  Visit #:     Insurance: Payor: Nani Maddox / Plan: [address] [place] [address] HMO / Product Type: Managed Care Medicare /      In time: 93 Out time:    Total Treatment Time: 45     BCBS/Medicare Time Tracking (below)   Total Timed Codes (min):  45 1:1 Treatment Time:  40     TREATMENT AREA =  Other abnormalities of gait and mobility [R26.89]    SUBJECTIVE  Pain Level (on 0 to 10 scale):  0  / 10   Medication Changes/New allergies or changes in medical history, any new surgeries or procedures?     NO    If yes, update Summary List   Subjective Functional Status/Changes:  []  No changes reported     I start to lean forward and to the L when I get tired        Modalities Rationale:     decrease inflammation, decrease pain and increase tissue extensibility to improve patient's ability to perform ADLs   min [] Estim, type/location:                                      []  att     []  unatt     []  w/US     []  w/ice    []  w/heat    min []  Mechanical Traction: type/lbs                   []  pro   []  sup   []  int   []  cont    []  before manual    []  after manual    min []  Ultrasound, settings/location:      min []  Iontophoresis w/ dexamethasone, location:                                               []  take home patch       []  in clinic    min []  Ice     []  Heat    location/position:     min []  Vasopneumatic Device, press/temp:     min []  Other:    [x] Skin assessment post-treatment (if applicable):    [x]  intact    [x]  redness- no adverse reaction     []redness - adverse reaction:        45/30 min Therapeutic Exercise:  [x]  See flow sheet   Rationale:      increase ROM and increase strength to improve the patients ability to perform transfers, bed mobility and prolonged walking     Billed With/As:   [x] TE   [] TA   [] Neuro   [] Self Care Patient Education: [x] Review HEP    [] Progressed/Changed HEP based on:   [] positioning   [] body mechanics   [] transfers   [] heat/ice application    [x] other: use cane when making turns at home in between rooms     Other Objective/Functional Measures:    TE per FS  Added forward and side steps over hurdles   Post Treatment Pain Level (on 0 to 10) scale:   0  / 10     ASSESSMENT  Assessment/Changes in Function:     Cueing required for full knee ext during step ups and inc glute recruitment to stand fully upright.     []  See Progress Note/Recertification   Patient will continue to benefit from skilled PT services to modify and progress therapeutic interventions, address functional mobility deficits, address ROM deficits, address strength deficits, analyze and address soft tissue restrictions, analyze and cue movement patterns, analyze and modify body mechanics/ergonomics, assess and modify postural abnormalities, address imbalance/dizziness and instruct in home and community integration to attain remaining goals.    Progress toward goals / Updated goals:    Progressing towards LTG3, no falls since starting PT     PLAN  [x]  Upgrade activities as tolerated YES Continue plan of care   []  Discharge due to :    []  Other:      Therapist: Alissa Sheffield, PT, DPT, MTC, CMTPT    Date: 7/23/2019 Time: 7:21 PM     Future Appointments   Date Time Provider Gui Woodward   7/26/2019 11:30 AM Sea Vicente, PT [facility]   7/29/2019 12:00 PM Vineet Tejada, PT [facility] [place]   7/31/2019 12:00 PM Enid Dunbar, PT [facility] [place] yes

## 2021-05-12 DIAGNOSIS — E78.00 HYPERCHOLESTEROLEMIA: ICD-10-CM

## 2021-05-12 DIAGNOSIS — E03.9 ACQUIRED HYPOTHYROIDISM: ICD-10-CM

## 2021-05-13 RX ORDER — LEVOTHYROXINE SODIUM 50 UG/1
TABLET ORAL
Qty: 90 TAB | Refills: 1 | Status: SHIPPED | OUTPATIENT
Start: 2021-05-13 | End: 2021-09-16 | Stop reason: SDUPTHER

## 2021-05-13 RX ORDER — ROSUVASTATIN CALCIUM 40 MG/1
TABLET, COATED ORAL
Qty: 90 TAB | Refills: 1 | Status: SHIPPED | OUTPATIENT
Start: 2021-05-13 | End: 2021-09-16 | Stop reason: SDUPTHER

## 2021-05-21 DIAGNOSIS — R60.0 EDEMA, LOWER EXTREMITY: ICD-10-CM

## 2021-05-21 NOTE — TELEPHONE ENCOUNTER
Requested Prescriptions     Pending Prescriptions Disp Refills    furosemide (LASIX) 20 mg tablet 30 Tablet 1     Sig: Take 1 Tablet by mouth daily. No future appointments.     Does the patient need potassium pills, as her potasium levels appear normal?

## 2021-05-24 RX ORDER — FUROSEMIDE 20 MG/1
20 TABLET ORAL DAILY
Qty: 30 TABLET | Refills: 1 | Status: SHIPPED | OUTPATIENT
Start: 2021-05-24 | End: 2021-07-07 | Stop reason: SDUPTHER

## 2021-05-28 DIAGNOSIS — R60.0 EDEMA, LOWER EXTREMITY: ICD-10-CM

## 2021-05-28 DIAGNOSIS — E03.9 ACQUIRED HYPOTHYROIDISM: ICD-10-CM

## 2021-05-28 RX ORDER — LEVOTHYROXINE SODIUM 50 UG/1
TABLET ORAL
Qty: 90 TABLET | Refills: 1 | Status: CANCELLED | OUTPATIENT
Start: 2021-05-28

## 2021-05-28 RX ORDER — FUROSEMIDE 20 MG/1
20 TABLET ORAL DAILY
Qty: 30 TABLET | Refills: 1 | Status: CANCELLED | OUTPATIENT
Start: 2021-05-28

## 2021-06-01 ENCOUNTER — TELEPHONE (OUTPATIENT)
Dept: FAMILY MEDICINE CLINIC | Age: 76
End: 2021-06-01

## 2021-06-01 NOTE — TELEPHONE ENCOUNTER
Just wanted to make sure that it is ok to call patient and say she does not need the potassium pills.

## 2021-06-03 NOTE — TELEPHONE ENCOUNTER
Pt has not had potassium in past 8mo and her last labs in Jan were normal so she does not need to take it.

## 2021-07-02 DIAGNOSIS — R60.0 EDEMA, LOWER EXTREMITY: ICD-10-CM

## 2021-07-07 DIAGNOSIS — G25.81 RESTLESS LEG: ICD-10-CM

## 2021-07-07 RX ORDER — FUROSEMIDE 20 MG/1
TABLET ORAL
Qty: 60 TABLET | Refills: 0 | Status: SHIPPED | OUTPATIENT
Start: 2021-07-07 | End: 2021-08-20

## 2021-07-12 RX ORDER — ROPINIROLE 0.5 MG/1
TABLET, FILM COATED ORAL
Qty: 270 TABLET | Refills: 1 | Status: SHIPPED | OUTPATIENT
Start: 2021-07-12

## 2021-08-18 DIAGNOSIS — R60.0 EDEMA, LOWER EXTREMITY: ICD-10-CM

## 2021-08-20 RX ORDER — FUROSEMIDE 20 MG/1
TABLET ORAL
Qty: 60 TABLET | Refills: 0 | Status: SHIPPED | OUTPATIENT
Start: 2021-08-20 | End: 2021-10-04

## 2021-09-16 ENCOUNTER — VIRTUAL VISIT (OUTPATIENT)
Dept: FAMILY MEDICINE CLINIC | Age: 76
End: 2021-09-16
Payer: MEDICARE

## 2021-09-16 DIAGNOSIS — R60.0 EDEMA, LOWER EXTREMITY: ICD-10-CM

## 2021-09-16 DIAGNOSIS — M25.552 LEFT HIP PAIN: Primary | ICD-10-CM

## 2021-09-16 DIAGNOSIS — M25.552 LEFT HIP PAIN: ICD-10-CM

## 2021-09-16 DIAGNOSIS — E78.00 HYPERCHOLESTEROLEMIA: ICD-10-CM

## 2021-09-16 DIAGNOSIS — K21.9 GASTROESOPHAGEAL REFLUX DISEASE WITHOUT ESOPHAGITIS: ICD-10-CM

## 2021-09-16 DIAGNOSIS — E03.9 ACQUIRED HYPOTHYROIDISM: ICD-10-CM

## 2021-09-16 DIAGNOSIS — J30.9 ALLERGIC RHINITIS, UNSPECIFIED SEASONALITY, UNSPECIFIED TRIGGER: ICD-10-CM

## 2021-09-16 DIAGNOSIS — M81.0 AGE-RELATED OSTEOPOROSIS WITHOUT CURRENT PATHOLOGICAL FRACTURE: ICD-10-CM

## 2021-09-16 PROCEDURE — 99214 OFFICE O/P EST MOD 30 MIN: CPT | Performed by: PHYSICIAN ASSISTANT

## 2021-09-16 PROCEDURE — 1101F PT FALLS ASSESS-DOCD LE1/YR: CPT | Performed by: PHYSICIAN ASSISTANT

## 2021-09-16 PROCEDURE — 1090F PRES/ABSN URINE INCON ASSESS: CPT | Performed by: PHYSICIAN ASSISTANT

## 2021-09-16 PROCEDURE — G8427 DOCREV CUR MEDS BY ELIG CLIN: HCPCS | Performed by: PHYSICIAN ASSISTANT

## 2021-09-16 PROCEDURE — G9717 DOC PT DX DEP/BP F/U NT REQ: HCPCS | Performed by: PHYSICIAN ASSISTANT

## 2021-09-16 PROCEDURE — 3017F COLORECTAL CA SCREEN DOC REV: CPT | Performed by: PHYSICIAN ASSISTANT

## 2021-09-16 PROCEDURE — G8756 NO BP MEASURE DOC: HCPCS | Performed by: PHYSICIAN ASSISTANT

## 2021-09-16 RX ORDER — IBANDRONATE SODIUM 150 MG/1
150 TABLET, FILM COATED ORAL
Qty: 3 TABLET | Refills: 3 | Status: SHIPPED | OUTPATIENT
Start: 2021-09-16 | End: 2022-01-31 | Stop reason: SDUPTHER

## 2021-09-16 RX ORDER — LEVOTHYROXINE SODIUM 50 UG/1
50 TABLET ORAL
Qty: 90 TABLET | Refills: 1 | Status: SHIPPED | OUTPATIENT
Start: 2021-09-16 | End: 2022-01-31 | Stop reason: SDUPTHER

## 2021-09-16 RX ORDER — CYCLOBENZAPRINE HCL 5 MG
5 TABLET ORAL
Qty: 30 TABLET | Refills: 0 | Status: SHIPPED | OUTPATIENT
Start: 2021-09-16 | End: 2022-01-31 | Stop reason: ALTCHOICE

## 2021-09-16 RX ORDER — PHENOL/SODIUM PHENOLATE
20 AEROSOL, SPRAY (ML) MUCOUS MEMBRANE DAILY
Qty: 90 TABLET | Refills: 1 | Status: SHIPPED | OUTPATIENT
Start: 2021-09-16 | End: 2022-01-31 | Stop reason: SDUPTHER

## 2021-09-16 RX ORDER — MONTELUKAST SODIUM 10 MG/1
10 TABLET ORAL DAILY
Qty: 90 TABLET | Refills: 3 | Status: SHIPPED | OUTPATIENT
Start: 2021-09-16

## 2021-09-16 RX ORDER — ROSUVASTATIN CALCIUM 40 MG/1
40 TABLET, COATED ORAL
Qty: 90 TABLET | Refills: 1 | Status: SHIPPED | OUTPATIENT
Start: 2021-09-16 | End: 2022-01-31 | Stop reason: SDUPTHER

## 2021-09-16 NOTE — PATIENT INSTRUCTIONS
Sore Throat: Care Instructions  Your Care Instructions     Infection by bacteria or a virus causes most sore throats. Cigarette smoke, dry air, air pollution, allergies, and yelling can also cause a sore throat. Sore throats can be painful and annoying. Fortunately, most sore throats go away on their own. If you have a bacterial infection, your doctor may prescribe antibiotics. Follow-up care is a key part of your treatment and safety. Be sure to make and go to all appointments, and call your doctor if you are having problems. It's also a good idea to know your test results and keep a list of the medicines you take. How can you care for yourself at home? · If your doctor prescribed antibiotics, take them as directed. Do not stop taking them just because you feel better. You need to take the full course of antibiotics. · Gargle with warm salt water once an hour to help reduce swelling and relieve discomfort. Use 1 teaspoon of salt mixed in 1 cup of warm water. · Take an over-the-counter pain medicine, such as acetaminophen (Tylenol), ibuprofen (Advil, Motrin), or naproxen (Aleve). Read and follow all instructions on the label. · Be careful when taking over-the-counter cold or flu medicines and Tylenol at the same time. Many of these medicines have acetaminophen, which is Tylenol. Read the labels to make sure that you are not taking more than the recommended dose. Too much acetaminophen (Tylenol) can be harmful. · Drink plenty of fluids. Fluids may help soothe an irritated throat. Hot fluids, such as tea or soup, may help decrease throat pain. · Use over-the-counter throat lozenges to soothe pain. Regular cough drops or hard candy may also help. These should not be given to young children because of the risk of choking. · Do not smoke or allow others to smoke around you. If you need help quitting, talk to your doctor about stop-smoking programs and medicines.  These can increase your chances of quitting for good. · Use a vaporizer or humidifier to add moisture to your bedroom. Follow the directions for cleaning the machine. When should you call for help? Call your doctor now or seek immediate medical care if:    · You have new or worse trouble swallowing.     · Your sore throat gets much worse on one side. Watch closely for changes in your health, and be sure to contact your doctor if you do not get better as expected. Where can you learn more? Go to http://www.gray.com/  Enter U420 in the search box to learn more about \"Sore Throat: Care Instructions. \"  Current as of: December 2, 2020               Content Version: 13.0  © 2502-0841 [employer]. Care instructions adapted under license by [employer] (which disclaims liability or warranty for this information). If you have questions about a medical condition or this instruction, always ask your healthcare professional. [address] any warranty or liability for your use of this information.

## 2021-09-16 NOTE — PROGRESS NOTES
HISTORY OF PRESENT ILLNESS  Fred Dhillon is a 78 y.o. female. HPI   Pt is being seen via doxy. me for c/o ST for the past few weeks that is improving. She states it started after she was admitted to the hosp last mo for trouble swallowing and food getting stuck in her throat. She had EGD, neck US, xrays, and labs that were normal and was advised to cut food up small and follow up with her GI. She sees GI in 2 weeks. She also has had left hip pain for the past 3-4 days. She did fall 3-4 weeks ago but hit her right leg and had a bruise on her lower leg but had no other issues. Denies edema, radiating pain, weakness, and numbness/tingling. She states the pain feel muscular and not \"in her hip\". She also needs refills on her meds    Allergies   Allergen Reactions    Erythromycin Rash    Gabapentin Other (comments)     Psych/hallucinations    Prednisone Hives     States can't take just prednisone but mixes are ok       Current Outpatient Medications   Medication Sig    cyclobenzaprine (FLEXERIL) 5 mg tablet Take 1 Tablet by mouth three (3) times daily as needed for Muscle Spasm(s).  levothyroxine (SYNTHROID) 50 mcg tablet Take 1 Tablet by mouth Daily (before breakfast).  ibandronate (Boniva) 150 mg tablet Take 150 mg by mouth every thirty (30) days.  montelukast (SINGULAIR) 10 mg tablet Take 1 Tablet by mouth daily.  Omeprazole delayed release (PRILOSEC D/R) 20 mg tablet Take 1 Tablet by mouth daily.  rosuvastatin (CRESTOR) 40 mg tablet Take 1 Tablet by mouth nightly.  furosemide (LASIX) 20 mg tablet TAKE 1 TABLET EVERY DAY    rOPINIRole (REQUIP) 0.5 mg tablet TAKE 1 TABLET THREE TIMES DAILY    KRILL OIL PO Take  by mouth.  ergocalciferol (ERGOCALCIFEROL) 1,250 mcg (50,000 unit) capsule Take 50,000 Units by mouth every seven (7) days.  budesonide (ENTOCORT EC) 3 mg capsule Take 9 mg by mouth daily.     cyanocobalamin (VITAMIN B12) 1,000 mcg/mL injection 1 mL by IntraMUSCular route every fourteen (14) days.  vitamin e (E GEMS) 100 unit capsule Take  by mouth daily.  ferrous sulfate (IRON) 325 mg (65 mg iron) cpER Take  by mouth.  valbenazine (INGREZZA) 80 mg cap Take  by mouth.  magnesium oxide (MAG-OX) 400 mg tablet Take 400 mg by mouth.  lamoTRIgine (LAMICTAL) 200 mg tablet Take 200 mg by mouth two (2) times a day.  lurasidone (LATUDA) 60 mg tab tablet Take 80 mg by mouth.  hydrOXYzine pamoate (VISTARIL) 25 mg capsule Take 25 mg by mouth nightly. No current facility-administered medications for this visit. Review of Systems   Constitutional: Negative. Negative for chills, fever and malaise/fatigue. HENT: Positive for sore throat. Negative for congestion, ear pain, sinus pain and tinnitus. Eyes: Negative. Respiratory: Negative. Negative for cough, shortness of breath and wheezing. Cardiovascular: Positive for leg swelling (bilateral). Negative for chest pain and palpitations. Gastrointestinal: Positive for heartburn (controlled on meds). Negative for abdominal pain, blood in stool, constipation, diarrhea, melena, nausea and vomiting. Genitourinary: Negative. Negative for dysuria, frequency, hematuria and urgency. Musculoskeletal: Positive for back pain (chronic) and joint pain (left hip). Skin: Negative. Negative for itching and rash. Neurological: Negative for dizziness and headaches. Psychiatric/Behavioral: Positive for memory loss (pt has dementia). Negative for depression. The patient is not nervous/anxious. Physical Exam  Constitutional:       General: She is not in acute distress. Appearance: Normal appearance. She is well-developed. She is not ill-appearing or diaphoretic. HENT:      Head: Normocephalic and atraumatic. Pulmonary:      Effort: No respiratory distress. Skin:     General: Skin is warm and dry. Neurological:      Mental Status: She is alert and oriented to person, place, and time.    Psychiatric: Behavior: Behavior normal.         ASSESSMENT and PLAN    ICD-10-CM ICD-9-CM    1. Left hip pain  M25.552 719.45 XR HIP LT W OR WO PELV 2-3 VWS      cyclobenzaprine (FLEXERIL) 5 mg tablet   2. Acquired hypothyroidism  E03.9 244.9 levothyroxine (SYNTHROID) 50 mcg tablet   3. Age-related osteoporosis without current pathological fracture  M81.0 733.01 ibandronate (Boniva) 150 mg tablet   4. Allergic rhinitis, unspecified seasonality, unspecified trigger  J30.9 477.9 montelukast (SINGULAIR) 10 mg tablet   5. Edema, lower extremity  R60.0 782.3    6. Gastroesophageal reflux disease without esophagitis  K21.9 530.81 Omeprazole delayed release (PRILOSEC D/R) 20 mg tablet   7. Hypercholesterolemia  E78.00 272.0 rosuvastatin (CRESTOR) 40 mg tablet     Follow-up and Dispositions    · Return in about 3 months (around 12/16/2021) for follow up. Pt expressed understanding of visit summary and plans for any follow ups or referrals as well as any medications prescribed. Seen by synchronous (real-time) audio-video technology via doxy. me on 9/16/2021    The patient is aware that this patient-initiated Telehealth encounter is a billable service, with coverage as determined by his or her insurance carrier. He/She is aware that they may receive a bill and has provided verbal consent to proceed: Yes        I was in the office while conducting this encounter.

## 2021-10-01 DIAGNOSIS — R60.0 EDEMA, LOWER EXTREMITY: ICD-10-CM

## 2021-10-04 ENCOUNTER — TELEPHONE (OUTPATIENT)
Dept: FAMILY MEDICINE CLINIC | Age: 76
End: 2021-10-04

## 2021-10-04 RX ORDER — FUROSEMIDE 20 MG/1
TABLET ORAL
Qty: 90 TABLET | Refills: 0 | Status: SHIPPED | OUTPATIENT
Start: 2021-10-04 | End: 2022-01-31 | Stop reason: ALTCHOICE

## 2021-10-04 NOTE — TELEPHONE ENCOUNTER
Patient is calling stating that her hip still hurts and that BRIANA Bruno stated that she would place an order for an x-ray. She would like this order sent to [facility].

## 2021-10-07 PROBLEM — R20.2 PARESTHESIA: Status: ACTIVE | Noted: 2021-10-07

## 2021-10-07 PROBLEM — R42 DIZZINESS: Status: ACTIVE | Noted: 2021-10-07

## 2021-10-07 PROBLEM — N39.0 URINARY TRACT INFECTION: Status: ACTIVE | Noted: 2021-10-07

## 2021-10-08 PROBLEM — N39.0 UTI (URINARY TRACT INFECTION): Status: ACTIVE | Noted: 2021-10-08

## 2021-10-15 ENCOUNTER — TELEPHONE (OUTPATIENT)
Dept: FAMILY MEDICINE CLINIC | Age: 76
End: 2021-10-15

## 2021-11-01 ENCOUNTER — PATIENT OUTREACH (OUTPATIENT)
Dept: CASE MANAGEMENT | Age: 76
End: 2021-11-01

## 2021-11-01 PROBLEM — R13.10 DYSPHAGIA: Status: ACTIVE | Noted: 2021-08-16

## 2021-11-01 PROBLEM — T18.128A ESOPHAGEAL OBSTRUCTION DUE TO FOOD IMPACTION: Status: ACTIVE | Noted: 2021-08-16

## 2021-11-01 PROBLEM — K22.2 ESOPHAGEAL OBSTRUCTION DUE TO FOOD IMPACTION: Status: ACTIVE | Noted: 2021-08-16

## 2021-11-01 RX ORDER — LURASIDONE HYDROCHLORIDE 80 MG/1
1 TABLET, FILM COATED ORAL DAILY
COMMUNITY
Start: 2021-10-27

## 2021-11-01 RX ORDER — ASPIRIN 325 MG
400 TABLET, DELAYED RELEASE (ENTERIC COATED) ORAL
COMMUNITY

## 2021-11-01 RX ORDER — VITAMIN E 268 MG
400 CAPSULE ORAL 2 TIMES DAILY
COMMUNITY

## 2021-11-01 RX ORDER — CHOLECALCIFEROL TAB 125 MCG (5000 UNIT) 125 MCG
5000 TAB ORAL
COMMUNITY
End: 2021-11-01

## 2021-11-01 NOTE — PROGRESS NOTES
.  Complex Case Management      Date/Time:  2021 2:06 PM    Method of communication with patient:phone    [address] [place] [address] ( [facility]) contacted the patient by telephone to perform Ambulatory Care Coordination. Verified name and  with patient as identifiers. Provided introduction to self, and explanation of the Ambulatory Care Manager's role. Reviewed most recent clinic visit w/ patient who verbalized understanding. Patient given an opportunity to ask questions. Patient had daughter in law Karl Araiza , added to conversation, because she is her care giver. She lives with her as well. Daughter in law updated patients local pharmacy ( [id number] [address] [place] [address]  [phone]). Patient had voiced that her family had said she smells bad that she has an odor. Daughter in law said not the patient but the bathroom as she had diarrhea last weak and the bathroom smelled bad. ACM explained to daughter in law , Daria Cowan that patient said she smells herself, She has a vagina ordor , she has a slight discharge when she change her briefs as she is incontinent of stool and bladder at times. ACM suggested she change her briefs every 4 hours, wipe front to back with wet swipes maybe witch hazel 50:50 H2O x 7 days. ACM will ask PCP for maybe diflucan as patient states she don't like inserting suppositories or cream into her vagina. Patient voicing her daughter in law to tell her to wipe front to back ,but she forgets. ACM said since she has her own bathroom Make a sign place on the door until it comes naturally to wipe front to back. Top Challenges reviewed with the patient   1. Patient having vagina discharge with odor>>> ACM instructed to wipe front to back. Will ask PCP for Diflucan   2. Patient has new local pharmacy>> [facility] has updated information     The patient agrees to contact the PCP office or the [address] [place] [address] for questions related to their healthcare.  [facility] provided contact information for future reference. Disease Specific:   N/A    Home Health Active: No    DME Active: Yes    Barriers to care? None     Advance Care Planning:   Does patient have an Advance Directive:  not on file; education provided It said's ACP on file ACM looked in media and ACP section no documents noted. Medication(s):   Medication reconciliation was performed with POA, who verbalizes understanding of administration of home medications. There were no barriers to obtaining medications identified at this time. Referral to Pharm D needed: no     Current Outpatient Medications   Medication Sig    vitamin E (AQUA GEMS) 400 unit capsule Take 400 Units by mouth two (2) times a day.  omega 3-dha-epa-fish oil 60- mg cap Take 400 mg by mouth.  Latuda 80 mg tab tablet Take 1 Tablet by mouth daily.  aspirin 81 mg chewable tablet Take 1 Tablet by mouth daily.  doxepin (SINEquan) 50 mg capsule Take  by mouth nightly.  furosemide (LASIX) 20 mg tablet TAKE 1 TABLET EVERY DAY    cyclobenzaprine (FLEXERIL) 5 mg tablet Take 1 Tablet by mouth three (3) times daily as needed for Muscle Spasm(s). (Patient not taking: Reported on 10/8/2021)    levothyroxine (SYNTHROID) 50 mcg tablet Take 1 Tablet by mouth Daily (before breakfast).  ibandronate (Boniva) 150 mg tablet Take 150 mg by mouth every thirty (30) days.  montelukast (SINGULAIR) 10 mg tablet Take 1 Tablet by mouth daily.  Omeprazole delayed release (PRILOSEC D/R) 20 mg tablet Take 1 Tablet by mouth daily.  rosuvastatin (CRESTOR) 40 mg tablet Take 1 Tablet by mouth nightly.  rOPINIRole (REQUIP) 0.5 mg tablet TAKE 1 TABLET THREE TIMES DAILY    ergocalciferol (ERGOCALCIFEROL) 1,250 mcg (50,000 unit) capsule Take 50,000 Units by mouth every seven (7) days.  budesonide (ENTOCORT EC) 3 mg capsule Take 9 mg by mouth daily.  cyanocobalamin (VITAMIN B12) 1,000 mcg/mL injection 1 mL by IntraMUSCular route every fourteen (14) days.     ferrous sulfate (IRON) 325 mg (65 mg iron) cpER Take  by mouth.  valbenazine (INGREZZA) 80 mg cap Take  by mouth.  magnesium oxide (MAG-OX) 400 mg tablet Take 400 mg by mouth.  lamoTRIgine (LAMICTAL) 200 mg tablet Take 200 mg by mouth two (2) times a day.  hydrOXYzine pamoate (VistariL) 25 mg capsule Take 25 mg by mouth nightly. No current facility-administered medications for this visit. [facility] follow up appointment(s): No future appointments. Non-[facility] follow up appointment(s): N/A    Goals        COPD     Improve activity tolerance and quality of life (ie. identify issue such as depression)        General     Attends follow up appointments on schedule      Safely Perform ADLs Goal Template      Patient will due hygiene scheduled as discuss;   Wipe front to back   Change  briefs every 4 hours x 7 days  Use Witch hazel 50:50 with H2O x 7 days  ACM will contact PCP concerning Vaginal Odor with discharge       Takes all medications as ordered      Understands prescribed medications as demonstrated by patient teach-back

## 2021-11-04 ENCOUNTER — PATIENT OUTREACH (OUTPATIENT)
Dept: CASE MANAGEMENT | Age: 76
End: 2021-11-04

## 2021-11-04 NOTE — PROGRESS NOTES
.Date/Time:  11/4/2021 2:38 PM    Method of communication with patient:phone    [facility] [facility] ( [facility]) made out reach to  patient by telephone for follow up Management  ( CCM). Provided introduction to self, and explanation of the Ambulatory Care . Contact at [phone] anytime Monday thru Friday 08:00-4:30.

## 2021-11-18 ENCOUNTER — PATIENT OUTREACH (OUTPATIENT)
Dept: CASE MANAGEMENT | Age: 76
End: 2021-11-18

## 2021-11-18 NOTE — PROGRESS NOTES
.Complex Case Management  Follow-Up       Date/Time:   11/18/2021  9:33 AM       Ambulatory Care Manager ( ACM) contacted patient for Complex Case Management  follow up. Spoke to patient  Introduced self/role and reason for call. Patient reported:   She is doing well. No problems voiced. Voicing she has no needs to make an follow up appointment with her PCP at this time. Pertinent concerns:No       Specialist appointments since last outreach? No   If so, specialist and date: N/A    Next PCP Appointment: None scheduled    Medications:     New medications since last outreach: No  Does patient need refills on any medications: No   Medication changes since last outreach (dose adjustments or discontinued meds): No      Home Health company: No    Barriers to care? None        Patient reminded that there are physicians on call 24 hours a day / 7 days a week (M-F 5pm to 8am and from Friday 5pm until Monday 8a for the weekend) should the patient have questions or concerns.

## 2021-11-30 ENCOUNTER — TELEPHONE (OUTPATIENT)
Dept: FAMILY MEDICINE CLINIC | Age: 76
End: 2021-11-30

## 2021-11-30 NOTE — TELEPHONE ENCOUNTER
Pt fell on left hip and shoulder. Bruise and increased pain but still mobile. occured on 11/25, did not go to emergency care.

## 2021-12-10 ENCOUNTER — PATIENT OUTREACH (OUTPATIENT)
Dept: CASE MANAGEMENT | Age: 76
End: 2021-12-10

## 2021-12-10 NOTE — PROGRESS NOTES
.Date/Time:  12/10/2021 3:45 PM    Method of communication with patient:phone    [facility] ( [facility]) made out reach to  patient by telephone for follow Complex Case Management  ( CCM). Provided introduction to self, and explanation of the Ambulatory Care . Contact at [phone] anytime Monday thru Friday 08:00-4:30.

## 2022-01-31 ENCOUNTER — HOSPITAL ENCOUNTER (OUTPATIENT)
Dept: LAB | Age: 77
Discharge: HOME | End: 2022-01-31
Payer: MEDICARE

## 2022-01-31 ENCOUNTER — OFFICE VISIT (OUTPATIENT)
Dept: FAMILY MEDICINE CLINIC | Age: 77
End: 2022-01-31
Payer: MEDICARE

## 2022-01-31 VITALS
HEART RATE: 71 BPM | BODY MASS INDEX: 28.88 KG/M2 | OXYGEN SATURATION: 99 % | HEIGHT: 67 IN | RESPIRATION RATE: 16 BRPM | TEMPERATURE: 97.1 F | DIASTOLIC BLOOD PRESSURE: 71 MMHG | SYSTOLIC BLOOD PRESSURE: 138 MMHG | WEIGHT: 184 LBS

## 2022-01-31 DIAGNOSIS — K21.9 GASTROESOPHAGEAL REFLUX DISEASE WITHOUT ESOPHAGITIS: ICD-10-CM

## 2022-01-31 DIAGNOSIS — E03.9 ACQUIRED HYPOTHYROIDISM: Primary | ICD-10-CM

## 2022-01-31 DIAGNOSIS — R23.1 PALE SKIN: ICD-10-CM

## 2022-01-31 DIAGNOSIS — F32.A MILD DEPRESSION: ICD-10-CM

## 2022-01-31 DIAGNOSIS — L30.9 DERMATITIS: ICD-10-CM

## 2022-01-31 DIAGNOSIS — T14.8XXA BRUISING: ICD-10-CM

## 2022-01-31 DIAGNOSIS — M81.0 AGE-RELATED OSTEOPOROSIS WITHOUT CURRENT PATHOLOGICAL FRACTURE: ICD-10-CM

## 2022-01-31 DIAGNOSIS — E03.9 ACQUIRED HYPOTHYROIDISM: ICD-10-CM

## 2022-01-31 DIAGNOSIS — Z86.2 HX OF IRON DEFICIENCY ANEMIA: ICD-10-CM

## 2022-01-31 DIAGNOSIS — E78.00 HYPERCHOLESTEROLEMIA: ICD-10-CM

## 2022-01-31 DIAGNOSIS — S51.811A SKIN TEAR OF FOREARM WITHOUT COMPLICATION, RIGHT, INITIAL ENCOUNTER: ICD-10-CM

## 2022-01-31 DIAGNOSIS — Z00.00 ENCOUNTER FOR ANNUAL WELLNESS EXAM IN MEDICARE PATIENT: ICD-10-CM

## 2022-01-31 DIAGNOSIS — J41.0 SIMPLE CHRONIC BRONCHITIS (HCC): ICD-10-CM

## 2022-01-31 DIAGNOSIS — R82.998 DARK URINE: ICD-10-CM

## 2022-01-31 DIAGNOSIS — N18.30 STAGE 3 CHRONIC KIDNEY DISEASE, UNSPECIFIED WHETHER STAGE 3A OR 3B CKD (HCC): ICD-10-CM

## 2022-01-31 DIAGNOSIS — F03.90 DEMENTIA WITHOUT BEHAVIORAL DISTURBANCE, UNSPECIFIED DEMENTIA TYPE: ICD-10-CM

## 2022-01-31 LAB
ALBUMIN SERPL-MCNC: 3.5 G/DL (ref 3.4–5)
ALBUMIN/GLOB SERPL: 1.2 {RATIO} (ref 0.8–1.7)
ALP SERPL-CCNC: 103 U/L (ref 45–117)
ALT SERPL-CCNC: 26 U/L (ref 13–56)
ANION GAP SERPL CALC-SCNC: 2 MMOL/L (ref 3–18)
APPEARANCE UR: ABNORMAL
AST SERPL-CCNC: 24 U/L (ref 10–38)
BACTERIA URNS QL MICRO: ABNORMAL /HPF
BASOPHILS # BLD: 0 K/UL (ref 0–0.1)
BASOPHILS NFR BLD: 1 % (ref 0–2)
BILIRUB SERPL-MCNC: 0.4 MG/DL (ref 0.2–1)
BILIRUB UR QL: NEGATIVE
BUN SERPL-MCNC: 33 MG/DL (ref 7–18)
BUN/CREAT SERPL: 23 (ref 12–20)
CALCIUM SERPL-MCNC: 9.3 MG/DL (ref 8.5–10.1)
CHLORIDE SERPL-SCNC: 106 MMOL/L (ref 100–111)
CHOLEST SERPL-MCNC: 125 MG/DL
CO2 SERPL-SCNC: 32 MMOL/L (ref 21–32)
COLOR UR: YELLOW
CREAT SERPL-MCNC: 1.44 MG/DL (ref 0.6–1.3)
DIFFERENTIAL METHOD BLD: ABNORMAL
EOSINOPHIL # BLD: 0.1 K/UL (ref 0–0.4)
EOSINOPHIL NFR BLD: 1 % (ref 0–5)
EPITH CASTS URNS QL MICRO: ABNORMAL /LPF (ref 0–5)
ERYTHROCYTE [DISTWIDTH] IN BLOOD BY AUTOMATED COUNT: 12.5 % (ref 11.6–14.5)
FERRITIN SERPL-MCNC: 118 NG/ML (ref 8–388)
GLOBULIN SER CALC-MCNC: 2.9 G/DL (ref 2–4)
GLUCOSE SERPL-MCNC: 87 MG/DL (ref 74–99)
GLUCOSE UR STRIP.AUTO-MCNC: NEGATIVE MG/DL
HCT VFR BLD AUTO: 38.7 % (ref 35–45)
HDLC SERPL-MCNC: 77 MG/DL (ref 40–60)
HDLC SERPL: 1.6 {RATIO} (ref 0–5)
HGB BLD-MCNC: 12.1 G/DL (ref 12–16)
HGB UR QL STRIP: NEGATIVE
IMM GRANULOCYTES # BLD AUTO: 0 K/UL (ref 0–0.04)
IMM GRANULOCYTES NFR BLD AUTO: 0 % (ref 0–0.5)
IRON SATN MFR SERPL: 20 % (ref 20–50)
IRON SERPL-MCNC: 63 UG/DL (ref 50–175)
KETONES UR QL STRIP.AUTO: ABNORMAL MG/DL
LDLC SERPL CALC-MCNC: 37 MG/DL (ref 0–100)
LEUKOCYTE ESTERASE UR QL STRIP.AUTO: ABNORMAL
LIPID PROFILE,FLP: ABNORMAL
LYMPHOCYTES # BLD: 0.6 K/UL (ref 0.9–3.6)
LYMPHOCYTES NFR BLD: 9 % (ref 21–52)
MCH RBC QN AUTO: 29.7 PG (ref 24–34)
MCHC RBC AUTO-ENTMCNC: 31.3 G/DL (ref 31–37)
MCV RBC AUTO: 94.9 FL (ref 78–100)
MONOCYTES # BLD: 0.2 K/UL (ref 0.05–1.2)
MONOCYTES NFR BLD: 3 % (ref 3–10)
NEUTS SEG # BLD: 6 K/UL (ref 1.8–8)
NEUTS SEG NFR BLD: 86 % (ref 40–73)
NITRITE UR QL STRIP.AUTO: POSITIVE
NRBC # BLD: 0 K/UL (ref 0–0.01)
NRBC BLD-RTO: 0 PER 100 WBC
PH UR STRIP: 8.5 [PH] (ref 5–8)
PLATELET # BLD AUTO: 209 K/UL (ref 135–420)
PMV BLD AUTO: 11.4 FL (ref 9.2–11.8)
POTASSIUM SERPL-SCNC: 4.6 MMOL/L (ref 3.5–5.5)
PROT SERPL-MCNC: 6.4 G/DL (ref 6.4–8.2)
PROT UR STRIP-MCNC: ABNORMAL MG/DL
RBC # BLD AUTO: 4.08 M/UL (ref 4.2–5.3)
RBC #/AREA URNS HPF: ABNORMAL /HPF (ref 0–5)
SODIUM SERPL-SCNC: 140 MMOL/L (ref 136–145)
SP GR UR REFRACTOMETRY: 1.02 (ref 1–1.03)
T4 FREE SERPL-MCNC: 1.2 NG/DL (ref 0.7–1.5)
TIBC SERPL-MCNC: 313 UG/DL (ref 250–450)
TRI-PHOS CRY URNS QL MICRO: ABNORMAL
TRIGL SERPL-MCNC: 55 MG/DL (ref ?–150)
TSH SERPL DL<=0.05 MIU/L-ACNC: 0.6 UIU/ML (ref 0.36–3.74)
UROBILINOGEN UR QL STRIP.AUTO: 1 EU/DL (ref 0.2–1)
VLDLC SERPL CALC-MCNC: 11 MG/DL
WBC # BLD AUTO: 6.9 K/UL (ref 4.6–13.2)
WBC URNS QL MICRO: ABNORMAL /HPF (ref 0–4)

## 2022-01-31 PROCEDURE — 80061 LIPID PANEL: CPT

## 2022-01-31 PROCEDURE — G8754 DIAS BP LESS 90: HCPCS | Performed by: PHYSICIAN ASSISTANT

## 2022-01-31 PROCEDURE — 83540 ASSAY OF IRON: CPT

## 2022-01-31 PROCEDURE — G8536 NO DOC ELDER MAL SCRN: HCPCS | Performed by: PHYSICIAN ASSISTANT

## 2022-01-31 PROCEDURE — G0439 PPPS, SUBSEQ VISIT: HCPCS | Performed by: PHYSICIAN ASSISTANT

## 2022-01-31 PROCEDURE — 3017F COLORECTAL CA SCREEN DOC REV: CPT | Performed by: PHYSICIAN ASSISTANT

## 2022-01-31 PROCEDURE — 84439 ASSAY OF FREE THYROXINE: CPT

## 2022-01-31 PROCEDURE — 87186 SC STD MICRODIL/AGAR DIL: CPT

## 2022-01-31 PROCEDURE — 87077 CULTURE AEROBIC IDENTIFY: CPT

## 2022-01-31 PROCEDURE — G8417 CALC BMI ABV UP PARAM F/U: HCPCS | Performed by: PHYSICIAN ASSISTANT

## 2022-01-31 PROCEDURE — G8752 SYS BP LESS 140: HCPCS | Performed by: PHYSICIAN ASSISTANT

## 2022-01-31 PROCEDURE — 80053 COMPREHEN METABOLIC PANEL: CPT

## 2022-01-31 PROCEDURE — 99214 OFFICE O/P EST MOD 30 MIN: CPT | Performed by: PHYSICIAN ASSISTANT

## 2022-01-31 PROCEDURE — 82728 ASSAY OF FERRITIN: CPT

## 2022-01-31 PROCEDURE — 87086 URINE CULTURE/COLONY COUNT: CPT

## 2022-01-31 PROCEDURE — 85025 COMPLETE CBC W/AUTO DIFF WBC: CPT

## 2022-01-31 PROCEDURE — G9717 DOC PT DX DEP/BP F/U NT REQ: HCPCS | Performed by: PHYSICIAN ASSISTANT

## 2022-01-31 PROCEDURE — 1090F PRES/ABSN URINE INCON ASSESS: CPT | Performed by: PHYSICIAN ASSISTANT

## 2022-01-31 PROCEDURE — 1101F PT FALLS ASSESS-DOCD LE1/YR: CPT | Performed by: PHYSICIAN ASSISTANT

## 2022-01-31 PROCEDURE — 81001 URINALYSIS AUTO W/SCOPE: CPT

## 2022-01-31 PROCEDURE — G8427 DOCREV CUR MEDS BY ELIG CLIN: HCPCS | Performed by: PHYSICIAN ASSISTANT

## 2022-01-31 RX ORDER — SILVER SULFADIAZINE 10 G/1000G
CREAM TOPICAL DAILY
Qty: 50 G | Refills: 0 | Status: SHIPPED | OUTPATIENT
Start: 2022-01-31 | End: 2022-01-31 | Stop reason: SDUPTHER

## 2022-01-31 RX ORDER — IBANDRONATE SODIUM 150 MG/1
150 TABLET, FILM COATED ORAL
Qty: 3 TABLET | Refills: 3 | Status: SHIPPED | OUTPATIENT
Start: 2022-01-31 | End: 2022-10-11 | Stop reason: SDUPTHER

## 2022-01-31 RX ORDER — LEVOTHYROXINE SODIUM 50 UG/1
50 TABLET ORAL
Qty: 90 TABLET | Refills: 1 | Status: SHIPPED | OUTPATIENT
Start: 2022-01-31 | End: 2022-06-27 | Stop reason: SDUPTHER

## 2022-01-31 RX ORDER — CLOTRIMAZOLE AND BETAMETHASONE DIPROPIONATE 10; .64 MG/G; MG/G
CREAM TOPICAL
Qty: 30 G | Refills: 3 | Status: SHIPPED | OUTPATIENT
Start: 2022-01-31 | End: 2022-07-11

## 2022-01-31 RX ORDER — ROSUVASTATIN CALCIUM 40 MG/1
40 TABLET, COATED ORAL
Qty: 90 TABLET | Refills: 1 | Status: SHIPPED | OUTPATIENT
Start: 2022-01-31 | End: 2022-06-27 | Stop reason: SDUPTHER

## 2022-01-31 RX ORDER — SILVER SULFADIAZINE 10 G/1000G
CREAM TOPICAL DAILY
Qty: 50 G | Refills: 0 | Status: SHIPPED | OUTPATIENT
Start: 2022-01-31 | End: 2022-06-27 | Stop reason: SDUPTHER

## 2022-01-31 RX ORDER — PHENOL/SODIUM PHENOLATE
20 AEROSOL, SPRAY (ML) MUCOUS MEMBRANE DAILY
Qty: 90 TABLET | Refills: 1 | Status: SHIPPED | OUTPATIENT
Start: 2022-01-31 | End: 2022-06-27 | Stop reason: SDUPTHER

## 2022-01-31 NOTE — PROGRESS NOTES
HISTORY OF PRESENT ILLNESS  Mauricio Byers is a 78 y.o. female. HPI   Pt is being seen for follow up. She tripped and fell last week and has skin tears on her forearms. She was seen at urgent care and nothing was broken but she is sore. She has also noticed increased bruising recently and dark urine. Denies dysuria, hematuria, abd/pelvic pain, flank pain, N/v/D, and odor. She is also following up on her GERD, thyroid, and cholesterol. She is lastly due for a wellness. .     Allergies   Allergen Reactions    Erythromycin Rash    Gabapentin Other (comments)     Psych/hallucinations    Ibuprofen Other (comments)     Patient states unable to take d/t kidneys      Prednisone Hives     States can't take just prednisone but mixes are ok       Current Outpatient Medications   Medication Sig    Omeprazole delayed release (PRILOSEC D/R) 20 mg tablet Take 1 Tablet by mouth daily.  rosuvastatin (CRESTOR) 40 mg tablet Take 1 Tablet by mouth nightly.  levothyroxine (SYNTHROID) 50 mcg tablet Take 1 Tablet by mouth Daily (before breakfast).  ibandronate (Boniva) 150 mg tablet Take 150 mg by mouth every thirty (30) days.  clotrimazole-betamethasone (LOTRISONE) topical cream Apply to rash bid    silver sulfADIAZINE (SILVADENE) 1 % topical cream Apply  to affected area daily.  vitamin E (AQUA GEMS) 400 unit capsule Take 400 Units by mouth two (2) times a day.  Latuda 80 mg tab tablet Take 1 Tablet by mouth daily.  doxepin (SINEquan) 50 mg capsule Take  by mouth nightly.  montelukast (SINGULAIR) 10 mg tablet Take 1 Tablet by mouth daily.  rOPINIRole (REQUIP) 0.5 mg tablet TAKE 1 TABLET THREE TIMES DAILY    ergocalciferol (ERGOCALCIFEROL) 1,250 mcg (50,000 unit) capsule Take 50,000 Units by mouth every seven (7) days.  budesonide (ENTOCORT EC) 3 mg capsule Take 9 mg by mouth daily.  cyanocobalamin (VITAMIN B12) 1,000 mcg/mL injection 1 mL by IntraMUSCular route every fourteen (14) days.     ferrous sulfate (IRON) 325 mg (65 mg iron) cpER Take  by mouth.  valbenazine (INGREZZA) 80 mg cap Take  by mouth.  magnesium oxide (MAG-OX) 400 mg tablet Take 400 mg by mouth.  lamoTRIgine (LAMICTAL) 200 mg tablet Take 200 mg by mouth two (2) times a day.  hydrOXYzine pamoate (VistariL) 25 mg capsule Take 25 mg by mouth nightly.  omega 3-dha-epa-fish oil 60- mg cap Take 400 mg by mouth. (Patient not taking: Reported on 1/31/2022)     No current facility-administered medications for this visit. Review of Systems   Constitutional: Negative. Negative for chills, fever and malaise/fatigue. HENT: Negative for congestion, ear pain, sinus pain, sore throat and tinnitus. Eyes: Negative. Respiratory: Negative. Negative for cough, shortness of breath and wheezing. Cardiovascular: Negative for chest pain, palpitations and leg swelling. Gastrointestinal: Positive for heartburn (controlled on meds). Negative for abdominal pain, blood in stool, constipation, diarrhea, melena, nausea and vomiting. Genitourinary: Negative. Negative for dysuria, frequency, hematuria and urgency. Musculoskeletal: Positive for back pain (chronic) and joint pain (left hip). Skin: Negative for itching and rash. Skin tears on both forearms   Neurological: Negative for dizziness and headaches. Psychiatric/Behavioral: Positive for memory loss (pt has dementia). Negative for depression. The patient is not nervous/anxious. Visit Vitals  /71 (BP 1 Location: Right upper arm, BP Patient Position: Sitting, BP Cuff Size: Large adult)   Pulse 71   Temp 97.1 °F (36.2 °C) (Temporal)   Resp 16   Ht 5' 7\" (1.702 m)   Wt 184 lb (83.5 kg)   SpO2 99%   BMI 28.82 kg/m²       Physical Exam  Vitals reviewed. Constitutional:       General: She is not in acute distress. Appearance: Normal appearance. She is well-developed. She is not ill-appearing or diaphoretic.    HENT:      Head: Normocephalic and atraumatic. Cardiovascular:      Rate and Rhythm: Normal rate and regular rhythm. Pulses: Normal pulses. Heart sounds: Normal heart sounds. Pulmonary:      Effort: Pulmonary effort is normal. No respiratory distress. Breath sounds: Normal breath sounds. Musculoskeletal:      Right forearm: Laceration (skin tears) and tenderness present. No swelling, edema or bony tenderness. Left forearm: Laceration (skin tears) and tenderness present. No swelling, edema or bony tenderness. Right lower le+ Edema present. Left lower le+ Edema present. Skin:     General: Skin is warm and dry. Coloration: Skin is pale. Neurological:      Mental Status: She is alert and oriented to person, place, and time. Psychiatric:         Behavior: Behavior normal.         ASSESSMENT and PLAN    ICD-10-CM ICD-9-CM    1. Acquired hypothyroidism  E03.9 244.9 levothyroxine (SYNTHROID) 50 mcg tablet      TSH AND FREE T4   2. Gastroesophageal reflux disease without esophagitis  K21.9 530.81 Omeprazole delayed release (PRILOSEC D/R) 20 mg tablet      METABOLIC PANEL, COMPREHENSIVE   3. Hypercholesterolemia  E78.00 272.0 rosuvastatin (CRESTOR) 40 mg tablet      LIPID PANEL   4. Age-related osteoporosis without current pathological fracture  M81.0 733.01 ibandronate (Boniva) 150 mg tablet   5. Bruising  T14. 8XXA 012.9 METABOLIC PANEL, COMPREHENSIVE      CANCELED: PROTHROMBIN TIME + INR   6. Pale skin  R23.1 782.61 CBC WITH AUTOMATED DIFF      FERRITIN      IRON PROFILE   7. Dark urine  R82.998 791.9 URINALYSIS W/ RFLX MICROSCOPIC      METABOLIC PANEL, COMPREHENSIVE      CULTURE, URINE   8. Dementia without behavioral disturbance, unspecified dementia type [facility]  F03.90 294.20    9. Simple chronic bronchitis (HCC)  J41.0 491.0    10. Mild depression (HCC)  F32.0 311    11. Stage 3 chronic kidney disease, unspecified whether stage 3a or 3b CKD (HCC)  N18.30 585.3    12.  Hx of iron deficiency anemia Z86.2 V12.3 FERRITIN      IRON PROFILE      CANCELED: PROTHROMBIN TIME + INR   13. Dermatitis  L30.9 692.9 clotrimazole-betamethasone (LOTRISONE) topical cream   14. Skin tear of forearm without complication, right, initial encounter  S51.811A 881.00 silver sulfADIAZINE (SILVADENE) 1 % topical cream      DISCONTINUED: silver sulfADIAZINE (SILVADENE) 1 % topical cream   15. Encounter for annual wellness exam in Medicare patient  Z00.00 V70.0      Follow-up and Dispositions    · Return in about 3 months (around 2022) for follow up. Pt expressed understanding of visit summary and plans for any follow ups or referrals as well as any medications prescribed. Medicare Wellness Visit  Elmer Moon is a 78 y.o. female and presents for annual Medicare Wellness Visit. Problem List: Reviewed with patient and discussed risk factors.     Patient Active Problem List   Diagnosis Code    Acquired hypothyroidism E03.9    Dementia without behavioral disturbance (HCC) F03.90    Mild depression ([address] F32.0    Simple chronic bronchitis ([facility] J41.0    Essential hypertension I10    CKD (chronic kidney disease) stage 3, GFR 30-59 ml/min ([facility]) N18.30    Paresthesia R20.2    Dizziness R42    Urinary tract infection N39.0    UTI (urinary tract infection) N39.0    Esophageal obstruction due to food impaction K22.2, Y91.819O    Dysphagia R13.10       Current medical providers:  Patient Care Team:  Vishnu Newell PA-C as PCP - General (Physician Assistant)  Vishnu Newell PA-C as PCP - [facility]    Adry Atkinson: Reviewed with patient  Past Surgical History:   Procedure Laterality Date    HX GI      Gastric Bypass X3, hemorrhoidectomy    HX GYN      Hysterectomy, , tubal ligation     HX HEENT      cataract 2015    HX ORTHOPAEDIC      Laminectomy  and , meniscus Left , TKR right , Left 2016        SH: Reviewed with patient  Social History     Tobacco Use    Smoking status: Never Smoker    Smokeless tobacco: Never Used   Substance Use Topics    Alcohol use: No    Drug use: No       FH: Reviewed with patient  Family History   Problem Relation Age of Onset    Hypertension Mother     Hypertension Father     Lung Disease Father     Cancer Brother        Medications/Allergies: Reviewed with patient  Current Outpatient Medications on File Prior to Visit   Medication Sig Dispense Refill    vitamin E (AQUA GEMS) 400 unit capsule Take 400 Units by mouth two (2) times a day.  Latuda 80 mg tab tablet Take 1 Tablet by mouth daily.  doxepin (SINEquan) 50 mg capsule Take  by mouth nightly.  montelukast (SINGULAIR) 10 mg tablet Take 1 Tablet by mouth daily. 90 Tablet 3    rOPINIRole (REQUIP) 0.5 mg tablet TAKE 1 TABLET THREE TIMES DAILY 270 Tablet 1    ergocalciferol (ERGOCALCIFEROL) 1,250 mcg (50,000 unit) capsule Take 50,000 Units by mouth every seven (7) days.  budesonide (ENTOCORT EC) 3 mg capsule Take 9 mg by mouth daily.  cyanocobalamin (VITAMIN B12) 1,000 mcg/mL injection 1 mL by IntraMUSCular route every fourteen (14) days. 10 mL 1    ferrous sulfate (IRON) 325 mg (65 mg iron) cpER Take  by mouth.  valbenazine (INGREZZA) 80 mg cap Take  by mouth.  magnesium oxide (MAG-OX) 400 mg tablet Take 400 mg by mouth.  lamoTRIgine (LAMICTAL) 200 mg tablet Take 200 mg by mouth two (2) times a day.  hydrOXYzine pamoate (VistariL) 25 mg capsule Take 25 mg by mouth nightly.  omega 3-dha-epa-fish oil 60- mg cap Take 400 mg by mouth. (Patient not taking: Reported on 1/31/2022)       No current facility-administered medications on file prior to visit.       Allergies   Allergen Reactions    Erythromycin Rash    Gabapentin Other (comments)     Psych/hallucinations    Ibuprofen Other (comments)     Patient states unable to take d/t kidneys      Prednisone Hives     States can't take just prednisone but mixes are ok       Objective:  Visit Vitals  /71 (BP 1 Location: Right upper arm, BP Patient Position: Sitting, BP Cuff Size: Large adult)   Pulse 71   Temp 97.1 °F (36.2 °C) (Temporal)   Resp 16   Ht 5' 7\" (1.702 m)   Wt 184 lb (83.5 kg)   SpO2 99%   BMI 28.82 kg/m²    Body mass index is 28.82 kg/m². Assessment of cognitive impairment: Alert and oriented x 3    Depression Screen:   3 most recent PHQ Screens 1/31/2022   PHQ Not Done -   Little interest or pleasure in doing things Not at all   Feeling down, depressed, irritable, or hopeless Not at all   Total Score PHQ 2 0   Trouble falling or staying asleep, or sleeping too much -   Feeling tired or having little energy -   Poor appetite, weight loss, or overeating -   Feeling bad about yourself - or that you are a failure or have let yourself or your family down -   Trouble concentrating on things such as school, work, reading, or watching TV -   Moving or speaking so slowly that other people could have noticed; or the opposite being so fidgety that others notice -   Thoughts of being better off dead, or hurting yourself in some way -   PHQ 9 Score -       Fall Risk Assessment:    Fall Risk Assessment, last 12 mths 1/31/2022   Able to walk? Yes   Fall in past 12 months? 1   Do you feel unsteady? 0   Are you worried about falling 0   Is TUG test greater than 12 seconds? 0   Is the gait abnormal? 0   Number of falls in past 12 months 1   Fall with injury? 1       Functional Ability:   Does the patient exhibit a steady gait? No - ambulates slowly w walker   How long did it take the patient to get up and walk from a sitting position? 20-30sec   Is the patient self reliant?  (ie can do own laundry, meals, household chores)  no     Does the patient handle his/her own medications?  no     Does the patient handle his/her own money? no     Is the patients home safe (ie good lighting, handrails on stairs and bath, etc.)?    Yes - home health has been seeing her and home was remodeled to incorporate safety bars and rails mini in bathroom     Did you notice or did patient express any hearing difficulties? no     Did you notice or did patient express any vision difficulties?   no     Were distance and reading eye charts used? No - declined       Advance Care Planning:   Patient was offered the opportunity to discuss advance care planning:  yes     Does patient have an Advance Directive:  yes   If no, did you provide information on Caring Connections?  no       Plan:      Orders Placed This Encounter    CULTURE, URINE    CBC WITH AUTOMATED DIFF    TSH AND FREE T4    FERRITIN    URINALYSIS W/ RFLX MICROSCOPIC    IRON PROFILE    METABOLIC PANEL, COMPREHENSIVE    LIPID PANEL    Omeprazole delayed release (PRILOSEC D/R) 20 mg tablet    rosuvastatin (CRESTOR) 40 mg tablet    levothyroxine (SYNTHROID) 50 mcg tablet    ibandronate (Boniva) 150 mg tablet    clotrimazole-betamethasone (LOTRISONE) topical cream    DISCONTD: silver sulfADIAZINE (SILVADENE) 1 % topical cream    silver sulfADIAZINE (SILVADENE) 1 % topical cream       Health Maintenance   Topic Date Due    Hepatitis C Screening  Never done    COVID-19 Vaccine (1) Never done    DTaP/Tdap/Td series (1 - Tdap) Never done    Shingrix Vaccine Age 50> (1 of 2) Never done    Breast Cancer Screen Mammogram  Never done    Pneumococcal 65+ years (1 of 1 - PPSV23) Never done    Depression Monitoring  09/16/2022    Lipid Screen  01/31/2023    Medicare Yearly Exam  02/01/2023    Colorectal Cancer Screening Combo  06/08/2030    Bone Densitometry (Dexa) Screening  Completed    Flu Vaccine  Completed       *Patient verbalized understanding and agreement with the plan. A copy of the After Visit Summary with personalized health plan was given to the patient today.

## 2022-02-01 ENCOUNTER — TELEPHONE (OUTPATIENT)
Dept: FAMILY MEDICINE CLINIC | Age: 77
End: 2022-02-01

## 2022-02-01 DIAGNOSIS — N30.00 ACUTE CYSTITIS WITHOUT HEMATURIA: Primary | ICD-10-CM

## 2022-02-01 RX ORDER — NITROFURANTOIN 25; 75 MG/1; MG/1
100 CAPSULE ORAL 2 TIMES DAILY
Qty: 14 CAPSULE | Refills: 0 | Status: SHIPPED | OUTPATIENT
Start: 2022-02-01 | End: 2022-06-20 | Stop reason: SDUPTHER

## 2022-02-01 NOTE — PATIENT INSTRUCTIONS
Schedule of Personalized Health Plan  (Provide Copy to Patient)  The best way to stay healthy is to live a healthy lifestyle. A healthy lifestyle includes regular exercise, eating a well-balanced diet, keeping a healthy weight and not smoking. Regular physical exams and screening tests are another important way to take care of yourself. Preventive exams provided by health care providers can find health problems early when treatment works best and can keep you from getting certain diseases or illnesses. Preventive services include exams, lab tests, screenings, shots, monitoring and information to help you take care of your own health. All people over 65 should have a pneumonia shot. Pneumonia shots are usually only needed once in a lifetime unless your doctor decides differently. All people over 65 should have a yearly flu shot. People over 65 are at medium to high risk for Hepatitis B. Three shots are needed for complete protection. In addition to your physical exam, some screening tests are recommended:    Bone mass measurement (dexa scan) is recommended every two years  Diabetes Mellitus screening is recommended every year. Glaucoma is an eye disease caused by high pressure in the eye. An eye exam is recommended every year. Cardiovascular screening tests that check your cholesterol and other blood fat (lipid) levels are recommended every five years. Colorectal Cancer screening tests help to find pre-cancerous polyps (growths in the colon) so they can be removed before they turn into cancer. Tests ordered for screening depend on your personal and family history risk factors.     Screening for Breast Cancer is recommended yearly with a mammogram.    Screening for Cervical Cancer is recommended every two years (annually for certain risk factors, such as previous history of STD or abnormal PAP in past 7 years), with a Pelvic Exam with PAP    Here is a list of your current Health Maintenance items with a due date:  Health Maintenance   Topic Date Due    Hepatitis C Screening  Never done    COVID-19 Vaccine (1) Never done    DTaP/Tdap/Td series (1 - Tdap) Never done    Shingrix Vaccine Age 50> (1 of 2) Never done    Breast Cancer Screen Mammogram  Never done    Pneumococcal 65+ years (1 of 1 - PPSV23) Never done    Depression Monitoring  09/16/2022    Lipid Screen  01/31/2023    Medicare Yearly Exam  02/01/2023    Colorectal Cancer Screening Combo  06/08/2030    Bone Densitometry (Dexa) Screening  Completed    Flu Vaccine  Completed

## 2022-02-01 NOTE — TELEPHONE ENCOUNTER
The [facility] lab called to state that the prthrombine drawn on the patient yesterday 01/31/22 needs to be recollected. The tube must be full in order for the test to be processed.

## 2022-02-01 NOTE — TELEPHONE ENCOUNTER
Please advise pt her urine was positive for a UTI. Her blood counts were ok so she is not anemic. Her thyroid and other labs were normal as well. I will send an abx to her pharmacy and the cx is pending.

## 2022-02-03 LAB
BACTERIA SPEC CULT: ABNORMAL
CC UR VC: ABNORMAL
SERVICE CMNT-IMP: ABNORMAL

## 2022-02-04 ENCOUNTER — CLINICAL SUPPORT (OUTPATIENT)
Dept: FAMILY MEDICINE CLINIC | Age: 77
End: 2022-02-04

## 2022-02-04 ENCOUNTER — HOSPITAL ENCOUNTER (OUTPATIENT)
Dept: LAB | Age: 77
Discharge: HOME | End: 2022-02-04
Payer: MEDICARE

## 2022-02-04 DIAGNOSIS — T14.8XXA BRUISING: ICD-10-CM

## 2022-02-04 DIAGNOSIS — T14.8XXA BRUISING: Primary | ICD-10-CM

## 2022-02-04 LAB
INR PPP: 1 (ref 0.8–1.2)
PROTHROMBIN TIME: 13.6 SEC (ref 11.5–15.2)

## 2022-02-04 PROCEDURE — 85610 PROTHROMBIN TIME: CPT

## 2022-02-23 ENCOUNTER — PATIENT OUTREACH (OUTPATIENT)
Dept: CASE MANAGEMENT | Age: 77
End: 2022-02-23

## 2022-02-23 NOTE — PROGRESS NOTES
.  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 Patient's symptoms are stable at this time. Patient/family has the ability to self-manage. Care management goals have been completed at this time. No further nurse navigator follow up scheduled. Goals Addressed                 This Visit's Progress       COPD     COMPLETED: Improve activity tolerance and quality of life (ie. identify issue such as depression)          General     COMPLETED: Attends follow up appointments on schedule   On track     COMPLETED: Safely Perform ADLs Goal Template   On track     Patient will due hygiene scheduled as discuss; Wipe front to back   Change  briefs every 4 hours x 7 days  Use Witch hazel 50:50 with H2O x 7 days  ACM will contact PCP concerning Vaginal Odor with discharge       COMPLETED: Takes all medications as ordered   On track     COMPLETED: Understands prescribed medications as demonstrated by patient teach-back   On track          Pt has ACM's contact information for any further questions, concerns, or needs. Patients upcoming visits:  No future appointments.

## 2022-02-26 ENCOUNTER — NURSE TRIAGE (OUTPATIENT)
Dept: OTHER | Facility: CLINIC | Age: 77
End: 2022-02-26

## 2022-02-26 NOTE — TELEPHONE ENCOUNTER
Received call from Zoie  at [facility] with [employer]. Subjective: Caller states \"I am having shortness of breath. \"     Current Symptoms: Constant SOB with and without activity. Pulse ox 96% after activity. Current Heart rate  78 and pulse ox 92-93%. Chest tightness across top of chest.     Denies cough, wheezing, Covid exposure, foreign travel or long trips, Blood clots,     Is vaccinated and booster. Hx COPD, states SOB not like that. Onset: 1 week ago; sudden, unchanged    Associated Symptoms: reduced activity    Pain Severity: 5/10; tightness; intermittent, with deep breaths    Temperature: none     What has been tried: rest    LMP: NA Pregnant: NA    Recommended disposition: See HCP within 4 Hours (or PCP triage)   Patient wanted to wait and see how she did. But her sats have been hovering around 90-94% while on the phone. Highly recommended she be seen due to possible oxygen deprivation when sleeping. Care advice provided, patient verbalizes understanding; denies any other questions or concerns; instructed to call back for any new or worsening symptoms. Patient/caller agrees to proceed to nearest [facility]      [facility] recommended. Attention Provider: Thank you for allowing me to participate in the care of your patient. The patient was connected to triage in response to information provided to the [facility]. Please do not respond through this encounter as the response is not directed to a shared pool.       Reason for Disposition   [1] MILD difficulty breathing (e.g., minimal/no SOB at rest, SOB with walking, pulse <100) AND [2] NEW-onset or WORSE than normal    Protocols used: BREATHING DIFFICULTY-ADULT-AH

## 2022-03-04 ENCOUNTER — VIRTUAL VISIT (OUTPATIENT)
Dept: FAMILY MEDICINE CLINIC | Age: 77
End: 2022-03-04
Payer: MEDICARE

## 2022-03-04 DIAGNOSIS — R05.3 CHRONIC COUGH: ICD-10-CM

## 2022-03-04 DIAGNOSIS — J42 CHRONIC BRONCHITIS, UNSPECIFIED CHRONIC BRONCHITIS TYPE (HCC): ICD-10-CM

## 2022-03-04 DIAGNOSIS — F03.90 DEMENTIA WITHOUT BEHAVIORAL DISTURBANCE, UNSPECIFIED DEMENTIA TYPE: ICD-10-CM

## 2022-03-04 DIAGNOSIS — R63.4 WEIGHT LOSS: ICD-10-CM

## 2022-03-04 DIAGNOSIS — R63.4 WEIGHT LOSS: Primary | ICD-10-CM

## 2022-03-04 PROCEDURE — 1090F PRES/ABSN URINE INCON ASSESS: CPT | Performed by: PHYSICIAN ASSISTANT

## 2022-03-04 PROCEDURE — 3017F COLORECTAL CA SCREEN DOC REV: CPT | Performed by: PHYSICIAN ASSISTANT

## 2022-03-04 PROCEDURE — 1101F PT FALLS ASSESS-DOCD LE1/YR: CPT | Performed by: PHYSICIAN ASSISTANT

## 2022-03-04 PROCEDURE — G8399 PT W/DXA RESULTS DOCUMENT: HCPCS | Performed by: PHYSICIAN ASSISTANT

## 2022-03-04 PROCEDURE — G8427 DOCREV CUR MEDS BY ELIG CLIN: HCPCS | Performed by: PHYSICIAN ASSISTANT

## 2022-03-04 PROCEDURE — 99214 OFFICE O/P EST MOD 30 MIN: CPT | Performed by: PHYSICIAN ASSISTANT

## 2022-03-04 PROCEDURE — G8756 NO BP MEASURE DOC: HCPCS | Performed by: PHYSICIAN ASSISTANT

## 2022-03-04 PROCEDURE — G9717 DOC PT DX DEP/BP F/U NT REQ: HCPCS | Performed by: PHYSICIAN ASSISTANT

## 2022-03-04 RX ORDER — AMOXICILLIN AND CLAVULANATE POTASSIUM 875; 125 MG/1; MG/1
1 TABLET, FILM COATED ORAL 2 TIMES DAILY
Qty: 20 TABLET | Refills: 0 | Status: SHIPPED | OUTPATIENT
Start: 2022-03-04 | End: 2022-03-14

## 2022-03-04 RX ORDER — VALBENAZINE 80 MG/1
1 CAPSULE ORAL DAILY
Qty: 30 CAPSULE | Refills: 2 | Status: SHIPPED | OUTPATIENT
Start: 2022-03-04 | End: 2022-07-07

## 2022-03-04 RX ORDER — ALBUTEROL SULFATE 90 UG/1
2 AEROSOL, METERED RESPIRATORY (INHALATION)
Qty: 18 G | Refills: 2 | Status: SHIPPED | OUTPATIENT
Start: 2022-03-04 | End: 2022-06-27 | Stop reason: SDUPTHER

## 2022-03-04 RX ORDER — METHYLPREDNISOLONE 4 MG/1
TABLET ORAL
Qty: 1 DOSE PACK | Refills: 0 | Status: SHIPPED | OUTPATIENT
Start: 2022-03-04 | End: 2022-07-11

## 2022-03-04 NOTE — PROGRESS NOTES
HISTORY OF PRESENT ILLNESS  Angélica Estrada is a 78 y.o. female. HPI  Pt is being seen via doxy. me for concerns about weight loss and a chronic cough. She has an 18lb weight loss in 2 months, along with SOB, worsening cough, and malaise. She has a smoking hx and has not has an chest imaging recently. Denies fever, chills, CP, palpitations, edema, dizziness, HA, N/V/D, ST, and nasal congestion. She had a neg COVID 1-2 wks ago. Her neurologist also has not been in touch with her to [place]ill [place] [id number] [place] and she has been out for 2-3 weeks so will refill that for her. Allergies   Allergen Reactions    Erythromycin Rash    Gabapentin Other (comments)     Psych/hallucinations    Ibuprofen Other (comments)     Patient states unable to take d/t kidneys      Prednisone Hives     States can't take just prednisone but mixes are ok       Current Outpatient Medications   Medication Sig    methylPREDNISolone (MEDROL DOSEPACK) 4 mg tablet Take as directed    amoxicillin-clavulanate (AUGMENTIN) 875-125 mg per tablet Take 1 Tablet by mouth two (2) times a day for 10 days.  albuterol (PROVENTIL HFA, VENTOLIN HFA, PROAIR HFA) 90 mcg/actuation inhaler Take 2 Puffs by inhalation every four (4) hours as needed for Wheezing.  valbenazine (Ingrezza) 80 mg cap Take 1 Capsule by mouth daily.  nitrofurantoin, macrocrystal-monohydrate, (Macrobid) 100 mg capsule Take 1 Capsule by mouth two (2) times a day.  Omeprazole delayed release (PRILOSEC D/R) 20 mg tablet Take 1 Tablet by mouth daily.  rosuvastatin (CRESTOR) 40 mg tablet Take 1 Tablet by mouth nightly.  levothyroxine (SYNTHROID) 50 mcg tablet Take 1 Tablet by mouth Daily (before breakfast).  ibandronate (Boniva) 150 mg tablet Take 150 mg by mouth every thirty (30) days.  clotrimazole-betamethasone (LOTRISONE) topical cream Apply to rash bid    silver sulfADIAZINE (SILVADENE) 1 % topical cream Apply  to affected area daily.     vitamin E (AQUA GEMS) 400 unit capsule Take 400 Units by mouth two (2) times a day.  omega 3-dha-epa-fish oil 60- mg cap Take 400 mg by mouth. (Patient not taking: Reported on 1/31/2022)    Latuda 80 mg tab tablet Take 1 Tablet by mouth daily.  doxepin (SINEquan) 50 mg capsule Take  by mouth nightly.  montelukast (SINGULAIR) 10 mg tablet Take 1 Tablet by mouth daily.  rOPINIRole (REQUIP) 0.5 mg tablet TAKE 1 TABLET THREE TIMES DAILY    ergocalciferol (ERGOCALCIFEROL) 1,250 mcg (50,000 unit) capsule Take 50,000 Units by mouth every seven (7) days.  budesonide (ENTOCORT EC) 3 mg capsule Take 9 mg by mouth daily.  cyanocobalamin (VITAMIN B12) 1,000 mcg/mL injection 1 mL by IntraMUSCular route every fourteen (14) days.  ferrous sulfate (IRON) 325 mg (65 mg iron) cpER Take  by mouth.  magnesium oxide (MAG-OX) 400 mg tablet Take 400 mg by mouth.  lamoTRIgine (LAMICTAL) 200 mg tablet Take 200 mg by mouth two (2) times a day.  hydrOXYzine pamoate (VistariL) 25 mg capsule Take 25 mg by mouth nightly. No current facility-administered medications for this visit. Review of Systems   Constitutional: Positive for malaise/fatigue and weight loss. Negative for chills and fever. HENT: Negative for congestion, ear pain, sinus pain, sore throat and tinnitus. Eyes: Negative. Respiratory: Positive for cough and shortness of breath. Negative for hemoptysis, sputum production and wheezing. Cardiovascular: Negative for chest pain, palpitations and leg swelling. Gastrointestinal: Positive for heartburn (controlled on meds). Negative for abdominal pain, blood in stool, constipation, diarrhea, melena, nausea and vomiting. Genitourinary: Negative. Negative for dysuria, frequency, hematuria and urgency. Musculoskeletal: Positive for back pain (chronic) and joint pain (left hip). Skin: Negative for itching and rash. Neurological: Negative for dizziness and headaches.    Psychiatric/Behavioral: Positive for memory loss (pt has dementia). Negative for depression. The patient is not nervous/anxious. Physical Exam  Constitutional:       General: She is not in acute distress. Appearance: Normal appearance. She is ill-appearing (pt does not look like she feels well but is not in distress). HENT:      Head: Normocephalic and atraumatic. Pulmonary:      Effort: No respiratory distress. Neurological:      Mental Status: She is alert and oriented to person, place, and time. Psychiatric:         Mood and Affect: Mood normal.         Behavior: Behavior normal.         Thought Content: Thought content normal.         Judgment: Judgment normal.         ASSESSMENT and PLAN    ICD-10-CM ICD-9-CM    1. Weight loss  R63.4 783.21 CT CHEST W WO CONT   2. Chronic cough  R05.3 786.2 methylPREDNISolone (MEDROL DOSEPACK) 4 mg tablet      CT CHEST W WO CONT      amoxicillin-clavulanate (AUGMENTIN) 875-125 mg per tablet   3. Chronic bronchitis, unspecified chronic bronchitis type ([facility])  J42 491.9 methylPREDNISolone (MEDROL DOSEPACK) 4 mg tablet      amoxicillin-clavulanate (AUGMENTIN) 875-125 mg per tablet      albuterol (PROVENTIL HFA, VENTOLIN HFA, PROAIR HFA) 90 mcg/actuation inhaler   4. Dementia without behavioral disturbance, unspecified dementia type ([facility]  F03.90 294.20 valbenazine (Ingrezza) 80 mg cap     Follow-up and Dispositions    · Return in about 4 weeks (around 4/1/2022) for follow up. Pt expressed understanding of visit summary and plans for any follow ups or referrals as well as any medications prescribed. Seen by synchronous (real-time) audio-video technology via doxy. me on 3/04/2022    The patient is aware that this patient-initiated Telehealth encounter is a billable service, with coverage as determined by his or her insurance carrier. He/She is aware that they may receive a bill and has provided verbal consent to proceed: Yes        I was in the office while conducting this encounter.

## 2022-03-14 NOTE — PATIENT INSTRUCTIONS
Chronic Obstructive Pulmonary Disease (COPD): Care Instructions  Overview     Chronic obstructive pulmonary disease (COPD) is a lung disease that makes it hard to breathe. With COPD, the airways that lead to the lungs are narrowed, and the tiny air sacs in the lungs are damaged and lose their stretch. People with COPD have decreased airflow in and out of the lungs, which makes it hard to breathe. The airways also can get clogged with thick mucus. Cigarette smoking is a major cause of COPD. Although there is no cure for COPD, you can slow its progress. Following your treatment plan and taking care of yourself can help you feel better and live longer. Follow-up care is a key part of your treatment and safety. Be sure to make and go to all appointments, and call your doctor if you are having problems. It's also a good idea to know your test results and keep a list of the medicines you take. How can you care for yourself at home? Staying healthy    · Do not smoke. This is the most important step you can take to prevent more damage to your lungs. If you need help quitting, talk to your doctor about stop-smoking programs and medicines. These can increase your chances of quitting for good.     · Avoid colds and other infections. Get the pneumococcal and whooping cough (pertussis) vaccines. If you have had these vaccines before, ask your doctor if you need another dose. Get the flu vaccine every fall. If you must be around people with colds or the flu, wash your hands often.     · Avoid secondhand smoke and air pollution. Try to stay inside with your windows closed when air pollution is bad. Medicines and oxygen therapy    · Take your medicines exactly as prescribed. Call your doctor if you think you are having a problem with your medicine. You may be taking medicines such as:  ? Bronchodilators. These help open your airways and make breathing easier.  They are either short-acting (work for 4 to 9 hours) or long-acting (work for 12 to 24 hours). You inhale most bronchodilators, so they start to act quickly. Always carry your quick-relief inhaler with you in case you need it. ? Corticosteroids (prednisone, budesonide). These reduce airway inflammation. They come in inhaled or pill form.     · Ask your doctor or pharmacist if you are using each type of inhaler correctly. With correct use, the medicine is more likely to get to your lungs.     · See if a spacer is right for you. A spacer may also help you get more inhaled medicine to your lungs. If you use one, ask how to use it properly.     · Do not take any vitamins, over-the-counter medicine, or herbal products without talking to your doctor first.     · If your doctor prescribed antibiotics, take them as directed. Do not stop taking them just because you feel better. You need to take the full course of antibiotics.     · If you use oxygen therapy, use the flow rate your doctor has recommended. Don't change it without talking to your doctor first. Oxygen therapy boosts the amount of oxygen in your blood and helps you breathe easier. Activity    · Get regular exercise. Walking is an easy way to get exercise. Start out slowly, and walk a little more each day.     · Pay attention to your breathing. You are exercising too hard if you can't talk while you exercise.     · Take short rest breaks when doing household chores and other activities.     · Learn breathing methods--such as breathing through pursed lips--to help you become less short of breath.     · If your doctor has not set you up with a pulmonary rehabilitation program, ask if rehab is right for you. Rehab includes exercise programs, education about your disease and how to manage it, help with diet and other changes, and emotional support.    Diet    · Eat regular, healthy meals.     · Use bronchodilators about 1 hour before you eat to make it easier to eat.     · Eat several smaller, frequent meals to prevent getting too full. A full stomach can push on the muscle that helps you breathe (your diaphragm) and make it harder to breathe.     · Drink beverages at the end of the meal.     · Avoid foods that are hard to chew.     · Eat foods that contain protein so you don't lose muscle mass.     · Talk with your doctor if you gain too much weight or if you lose weight without trying. Mental health    · Talk to your family, friends, or a therapist about your feelings. Some people feel frightened, angry, hopeless, helpless, and even guilty. Talking openly about feelings may help you cope. If these feelings last, talk to your doctor. When should you call for help? Call 911 anytime you think you may need emergency care. For example, call if:    · You have severe trouble breathing.     · You are having chest pain that is different or worse than usual.   Call your doctor now or seek immediate medical care if:    · You have new or worse trouble breathing.     · You cough up blood.     · You have a fever.     · You have feelings of anxiety or depression. Watch closely for changes in your health, and be sure to contact your doctor if:    · You cough more deeply or more often, especially if you notice more mucus or a change in the color of your mucus.     · You have new or worse swelling in your legs or belly.     · You are not getting better as expected. Where can you learn more? Go to http://www.gray.com/  Enter L330 in the search box to learn more about \"Chronic Obstructive Pulmonary Disease (COPD): Care Instructions. \"  Current as of: July 6, 2021               Content Version: 13.2  © 2006-2022 [employer]. Care instructions adapted under license by [employer] (which disclaims liability or warranty for this information).  If you have questions about a medical condition or this instruction, always ask your healthcare professional. [place] [address] any warranty or liability for your use of this information.

## 2022-03-16 ENCOUNTER — HOSPITAL ENCOUNTER (OUTPATIENT)
Dept: CT IMAGING | Age: 77
Discharge: HOME | End: 2022-03-16
Attending: PHYSICIAN ASSISTANT
Payer: MEDICARE

## 2022-03-16 DIAGNOSIS — R63.4 WEIGHT LOSS: ICD-10-CM

## 2022-03-16 DIAGNOSIS — R05.3 CHRONIC COUGH: ICD-10-CM

## 2022-03-16 LAB — CREAT UR-MCNC: 1.3 MG/DL (ref 0.6–1.3)

## 2022-03-16 PROCEDURE — 74011000636 HC RX REV CODE- 636: Performed by: PHYSICIAN ASSISTANT

## 2022-03-16 PROCEDURE — 82565 ASSAY OF CREATININE: CPT

## 2022-03-16 PROCEDURE — 71260 CT THORAX DX C+: CPT

## 2022-03-16 RX ADMIN — IOPAMIDOL 60 ML: 612 INJECTION, SOLUTION INTRAVENOUS at 14:46

## 2022-03-22 ENCOUNTER — TELEPHONE (OUTPATIENT)
Dept: FAMILY MEDICINE CLINIC | Age: 77
End: 2022-03-22

## 2022-03-22 DIAGNOSIS — R93.89 ABNORMAL CT OF THE CHEST: Primary | ICD-10-CM

## 2022-03-22 DIAGNOSIS — Z12.31 ENCOUNTER FOR SCREENING MAMMOGRAM FOR MALIGNANT NEOPLASM OF BREAST: ICD-10-CM

## 2022-03-23 DIAGNOSIS — Z12.31 ENCOUNTER FOR SCREENING MAMMOGRAM FOR MALIGNANT NEOPLASM OF BREAST: ICD-10-CM

## 2022-03-23 DIAGNOSIS — R93.89 ABNORMAL CT OF THE CHEST: ICD-10-CM

## 2022-03-23 NOTE — TELEPHONE ENCOUNTER
Please advise pt her CT showed a solitary bone lesion on her rib that needs further eval. I have ordered a MRI as well as some additional labs. She also needs to have a mammogram done which I also ordered.

## 2022-03-24 ENCOUNTER — TELEPHONE (OUTPATIENT)
Dept: FAMILY MEDICINE CLINIC | Age: 77
End: 2022-03-24

## 2022-03-24 DIAGNOSIS — F41.9 ANXIETY: Primary | ICD-10-CM

## 2022-03-24 RX ORDER — DIAZEPAM 2 MG/1
TABLET ORAL
Qty: 2 TABLET | Refills: 0 | Status: SHIPPED | OUTPATIENT
Start: 2022-03-24 | End: 2022-07-11

## 2022-03-24 NOTE — TELEPHONE ENCOUNTER
Pt is, on nikia's recommendation requesting a minor sedative be sent to the [facility] for her mri.

## 2022-04-11 ENCOUNTER — TELEPHONE (OUTPATIENT)
Dept: FAMILY MEDICINE CLINIC | Age: 77
End: 2022-04-11

## 2022-04-11 NOTE — TELEPHONE ENCOUNTER
Alejandra Ortiz is callng to ensure that the appropriate test is being ordered chest not abdomin.   What is that we are looking for in the MRI of the chest.

## 2022-04-12 NOTE — TELEPHONE ENCOUNTER
Pt had a CT that showed a lesion on her right 9th rib that could possibly be a metastatic lesion or could be benign.  Trying to evaluate that further

## 2022-04-13 ENCOUNTER — TELEPHONE (OUTPATIENT)
Dept: FAMILY MEDICINE CLINIC | Age: 77
End: 2022-04-13

## 2022-04-13 NOTE — TELEPHONE ENCOUNTER
Patient is calling to speak with someone about her CT results. She has her MRI set up, but no one has discussed her CT results with her. Please call patient.

## 2022-04-13 NOTE — TELEPHONE ENCOUNTER
----- Message from Kacy Tovar sent at 4/12/2022  4:27 PM EDT -----  Subject: Results Request    QUESTIONS  Which lab or imaging result is the patient calling about? CT  Which provider ordered the test? [address] [place] [address].   At what location was the test performed? Date the test was performed? 2022-03-16  Additional Information for Provider? Please contact Aletha Esquivel to discuss the   results of her CT scan. She is curious about where the lump is.  ---------------------------------------------------------------------------  --------------  CALL BACK INFO  What is the best way for the office to contact you? OK to leave message on   voicemail  Preferred Call Back Phone Number? [phone]  ---------------------------------------------------------------------------  --------------  SCRIPT ANSWERS  Relationship to Patient?  Self

## 2022-04-14 NOTE — TELEPHONE ENCOUNTER
What was pt notified about?  The message was for the imaging dept that had a question about her test. Caty Gaxiola spoke with them on the 12th

## 2022-04-19 ENCOUNTER — TELEPHONE (OUTPATIENT)
Dept: FAMILY MEDICINE CLINIC | Age: 77
End: 2022-04-19

## 2022-04-19 NOTE — TELEPHONE ENCOUNTER
Please advise pt her MRI showed a benign lesion dating back to at least June 4th 2020 that has been stable in size and has benign imaging features. It does not appear to me malignant however I would like to repeat imaging in 1 year to be sure.

## 2022-04-26 ENCOUNTER — TELEPHONE (OUTPATIENT)
Dept: FAMILY MEDICINE CLINIC | Age: 77
End: 2022-04-26

## 2022-04-26 NOTE — TELEPHONE ENCOUNTER
----- Message from Atultanvi Solomon sent at 4/26/2022 11:39 AM EDT -----  Subject: Results Request    QUESTIONS  Which lab or imaging result is the patient calling about? Mammogram   results  Which provider ordered the test? [address] [place] [address].   At what location was the test performed? Date the test was performed? 2022-04-22  Additional Information for Provider? [phone] if not available on   other number   ---------------------------------------------------------------------------  --------------  CALL BACK INFO  What is the best way for the office to contact you? OK to leave message on   voicemail  Preferred Call Back Phone Number? [phone]  ---------------------------------------------------------------------------  --------------  SCRIPT ANSWERS  Relationship to Patient?  Self

## 2022-06-20 ENCOUNTER — HOSPITAL ENCOUNTER (OUTPATIENT)
Dept: LAB | Age: 77
Discharge: HOME | End: 2022-06-20
Payer: MEDICARE

## 2022-06-20 ENCOUNTER — OFFICE VISIT (OUTPATIENT)
Dept: FAMILY MEDICINE CLINIC | Age: 77
End: 2022-06-20
Payer: MEDICARE

## 2022-06-20 VITALS
SYSTOLIC BLOOD PRESSURE: 138 MMHG | WEIGHT: 179 LBS | DIASTOLIC BLOOD PRESSURE: 72 MMHG | TEMPERATURE: 96.9 F | OXYGEN SATURATION: 92 % | HEIGHT: 67 IN | BODY MASS INDEX: 28.09 KG/M2 | RESPIRATION RATE: 16 BRPM | HEART RATE: 87 BPM

## 2022-06-20 DIAGNOSIS — E03.9 ACQUIRED HYPOTHYROIDISM: ICD-10-CM

## 2022-06-20 DIAGNOSIS — R79.89 ELEVATED LFTS: Primary | ICD-10-CM

## 2022-06-20 DIAGNOSIS — R74.01 ELEVATION OF LEVELS OF LIVER TRANSAMINASE LEVELS: ICD-10-CM

## 2022-06-20 DIAGNOSIS — R63.4 WEIGHT LOSS: ICD-10-CM

## 2022-06-20 DIAGNOSIS — R79.89 ELEVATED LFTS: ICD-10-CM

## 2022-06-20 DIAGNOSIS — R82.90 ABNORMAL URINE ODOR: ICD-10-CM

## 2022-06-20 DIAGNOSIS — S80.12XA HEMATOMA OF LEFT LOWER LEG: ICD-10-CM

## 2022-06-20 DIAGNOSIS — N30.00 ACUTE CYSTITIS WITHOUT HEMATURIA: ICD-10-CM

## 2022-06-20 DIAGNOSIS — R82.998 DARK URINE: ICD-10-CM

## 2022-06-20 DIAGNOSIS — Z09 HOSPITAL DISCHARGE FOLLOW-UP: ICD-10-CM

## 2022-06-20 LAB
ALBUMIN SERPL-MCNC: 3.4 G/DL (ref 3.4–5)
ALBUMIN/GLOB SERPL: 1.1 {RATIO} (ref 0.8–1.7)
ALP SERPL-CCNC: 150 U/L (ref 45–117)
ALT SERPL-CCNC: 42 U/L (ref 13–56)
AMYLASE SERPL-CCNC: 38 U/L (ref 25–115)
ANION GAP SERPL CALC-SCNC: 6 MMOL/L (ref 3–18)
AST SERPL-CCNC: 26 U/L (ref 10–38)
BILIRUB SERPL-MCNC: 0.4 MG/DL (ref 0.2–1)
BUN SERPL-MCNC: 34 MG/DL (ref 7–18)
BUN/CREAT SERPL: 21 (ref 12–20)
CALCIUM SERPL-MCNC: 9.5 MG/DL (ref 8.5–10.1)
CHLORIDE SERPL-SCNC: 103 MMOL/L (ref 100–111)
CO2 SERPL-SCNC: 31 MMOL/L (ref 21–32)
CREAT SERPL-MCNC: 1.63 MG/DL (ref 0.6–1.3)
GLOBULIN SER CALC-MCNC: 3 G/DL (ref 2–4)
GLUCOSE SERPL-MCNC: 99 MG/DL (ref 74–99)
LIPASE SERPL-CCNC: 34 U/L (ref 73–393)
POTASSIUM SERPL-SCNC: 5 MMOL/L (ref 3.5–5.5)
PROT SERPL-MCNC: 6.4 G/DL (ref 6.4–8.2)
SODIUM SERPL-SCNC: 140 MMOL/L (ref 136–145)
T4 FREE SERPL-MCNC: 1.3 NG/DL (ref 0.7–1.5)
TSH SERPL DL<=0.05 MIU/L-ACNC: 0.53 UIU/ML (ref 0.36–3.74)

## 2022-06-20 PROCEDURE — G8754 DIAS BP LESS 90: HCPCS | Performed by: PHYSICIAN ASSISTANT

## 2022-06-20 PROCEDURE — 80074 ACUTE HEPATITIS PANEL: CPT

## 2022-06-20 PROCEDURE — G8427 DOCREV CUR MEDS BY ELIG CLIN: HCPCS | Performed by: PHYSICIAN ASSISTANT

## 2022-06-20 PROCEDURE — 83690 ASSAY OF LIPASE: CPT

## 2022-06-20 PROCEDURE — 87077 CULTURE AEROBIC IDENTIFY: CPT

## 2022-06-20 PROCEDURE — 87186 SC STD MICRODIL/AGAR DIL: CPT

## 2022-06-20 PROCEDURE — 36415 COLL VENOUS BLD VENIPUNCTURE: CPT

## 2022-06-20 PROCEDURE — 1101F PT FALLS ASSESS-DOCD LE1/YR: CPT | Performed by: PHYSICIAN ASSISTANT

## 2022-06-20 PROCEDURE — G8417 CALC BMI ABV UP PARAM F/U: HCPCS | Performed by: PHYSICIAN ASSISTANT

## 2022-06-20 PROCEDURE — 81003 URINALYSIS AUTO W/O SCOPE: CPT | Performed by: PHYSICIAN ASSISTANT

## 2022-06-20 PROCEDURE — G8752 SYS BP LESS 140: HCPCS | Performed by: PHYSICIAN ASSISTANT

## 2022-06-20 PROCEDURE — 1090F PRES/ABSN URINE INCON ASSESS: CPT | Performed by: PHYSICIAN ASSISTANT

## 2022-06-20 PROCEDURE — 82150 ASSAY OF AMYLASE: CPT

## 2022-06-20 PROCEDURE — 3017F COLORECTAL CA SCREEN DOC REV: CPT | Performed by: PHYSICIAN ASSISTANT

## 2022-06-20 PROCEDURE — G8536 NO DOC ELDER MAL SCRN: HCPCS | Performed by: PHYSICIAN ASSISTANT

## 2022-06-20 PROCEDURE — 80053 COMPREHEN METABOLIC PANEL: CPT

## 2022-06-20 PROCEDURE — 84439 ASSAY OF FREE THYROXINE: CPT

## 2022-06-20 PROCEDURE — 99214 OFFICE O/P EST MOD 30 MIN: CPT | Performed by: PHYSICIAN ASSISTANT

## 2022-06-20 PROCEDURE — G9717 DOC PT DX DEP/BP F/U NT REQ: HCPCS | Performed by: PHYSICIAN ASSISTANT

## 2022-06-20 PROCEDURE — 1123F ACP DISCUSS/DSCN MKR DOCD: CPT | Performed by: PHYSICIAN ASSISTANT

## 2022-06-20 PROCEDURE — 87086 URINE CULTURE/COLONY COUNT: CPT

## 2022-06-20 PROCEDURE — G8399 PT W/DXA RESULTS DOCUMENT: HCPCS | Performed by: PHYSICIAN ASSISTANT

## 2022-06-20 RX ORDER — MECLIZINE HYDROCHLORIDE 25 MG/1
TABLET ORAL
COMMUNITY

## 2022-06-20 RX ORDER — FUROSEMIDE 40 MG/1
TABLET ORAL DAILY
COMMUNITY

## 2022-06-20 RX ORDER — NITROFURANTOIN 25; 75 MG/1; MG/1
100 CAPSULE ORAL 2 TIMES DAILY
Qty: 14 CAPSULE | Refills: 0 | Status: SHIPPED | OUTPATIENT
Start: 2022-06-20 | End: 2022-06-27 | Stop reason: ALTCHOICE

## 2022-06-20 NOTE — PROGRESS NOTES
HISTORY OF PRESENT ILLNESS  Vijay Perdue is a 78 y.o. female. HPI  Pt is being seen for follow up on falls. She fell June 9th, tripped over vaccuum and hit her head on the floor and had a hematoma but was not seen, Then fell getting into bed that evening and fell on her buttock. On the 10th she fell a 3rd time getting into the shower. She went to ER after the 3rd fall and had a broken finger and was found to have bleeding on brain likely from the 1st fall. She was admitted for observation. She had a COVID positive test 3 weeks ago. She was in the ER again last week for abd pain however just restarted refluz meds but LFTs were elevated and she was advised to follow up on that. She feels overall she is doing much better. Allergies   Allergen Reactions    Erythromycin Rash    Gabapentin Other (comments)     Psych/hallucinations    Ibuprofen Other (comments)     Patient states unable to take d/t kidneys      Prednisone Hives     States can't take just prednisone but mixes are ok       Current Outpatient Medications   Medication Sig    furosemide (Lasix) 40 mg tablet Take  by mouth daily.  meclizine (ANTIVERT) 25 mg tablet Take  by mouth three (3) times daily as needed for Dizziness.  ibandronate (Boniva) 150 mg tablet Take 150 mg by mouth every thirty (30) days.  vitamin E (AQUA GEMS) 400 unit capsule Take 400 Units by mouth two (2) times a day.  omega 3-dha-epa-fish oil 60- mg cap Take 400 mg by mouth.  Latuda 80 mg tab tablet Take 1 Tablet by mouth daily.  doxepin (SINEquan) 50 mg capsule Take  by mouth nightly.  montelukast (SINGULAIR) 10 mg tablet Take 1 Tablet by mouth daily.  rOPINIRole (REQUIP) 0.5 mg tablet TAKE 1 TABLET THREE TIMES DAILY    ergocalciferol (ERGOCALCIFEROL) 1,250 mcg (50,000 unit) capsule Take 50,000 Units by mouth every seven (7) days.  budesonide (ENTOCORT EC) 3 mg capsule Take 9 mg by mouth daily.     cyanocobalamin (VITAMIN B12) 1,000 mcg/mL injection 1 mL by IntraMUSCular route every fourteen (14) days.  ferrous sulfate (IRON) 325 mg (65 mg iron) cpER Take  by mouth.  magnesium oxide (MAG-OX) 400 mg tablet Take 400 mg by mouth.  lamoTRIgine (LAMICTAL) 200 mg tablet Take 200 mg by mouth two (2) times a day.  nystatin (MYCOSTATIN) powder Apply  to affected area two (2) times a day. Apply  to affected area two (2) times a day.  Ingrezza 80 mg cap TAKE ONE CAPSULE BY MOUTH DAILY    rosuvastatin (CRESTOR) 40 mg tablet TAKE 1 TABLET EVERY NIGHT    levothyroxine (SYNTHROID) 50 mcg tablet Take 1 Tablet by mouth Daily (before breakfast).  Omeprazole delayed release (PRILOSEC D/R) 20 mg tablet Take 1 Tablet by mouth daily.  silver sulfADIAZINE (SILVADENE) 1 % topical cream Apply  to affected area daily.  albuterol (PROVENTIL HFA, VENTOLIN HFA, PROAIR HFA) 90 mcg/actuation inhaler Take 2 Puffs by inhalation every four (4) hours as needed for Wheezing. No current facility-administered medications for this visit. Review of Systems   Constitutional: Positive for malaise/fatigue and weight loss. Negative for chills and fever. HENT: Negative for congestion, ear pain, sinus pain, sore throat and tinnitus. Eyes: Negative. Respiratory: Positive for shortness of breath (chronic). Negative for cough, hemoptysis, sputum production and wheezing. Cardiovascular: Positive for leg swelling (hematoma of left lower leg). Negative for chest pain and palpitations. Gastrointestinal: Positive for heartburn (controlled on meds). Negative for abdominal pain, blood in stool, constipation, diarrhea, melena, nausea and vomiting. Genitourinary: Negative. Negative for dysuria, frequency, hematuria and urgency. Musculoskeletal: Positive for back pain (chronic) and joint pain (left hip). Skin: Negative for itching and rash. Neurological: Negative for dizziness and headaches.    Psychiatric/Behavioral: Positive for memory loss (pt has dementia). Negative for depression. The patient is not nervous/anxious. Visit Vitals  /72 (BP 1 Location: Left upper arm, BP Patient Position: Sitting)   Pulse 87   Temp 96.9 °F (36.1 °C) (Temporal)   Resp 16   Ht 5' 7\" (1.702 m)   Wt 179 lb (81.2 kg)   SpO2 92%   BMI 28.04 kg/m²       Physical Exam  Constitutional:       Appearance: Normal appearance. HENT:      Head: Normocephalic and atraumatic. Cardiovascular:      Pulses: Normal pulses. Heart sounds: Normal heart sounds. Pulmonary:      Effort: Pulmonary effort is normal.      Breath sounds: Normal breath sounds. Skin:     Comments: Hematoma of left lower leg with no signs of infection. Neurological:      Mental Status: She is alert and oriented to person, place, and time. Psychiatric:         Mood and Affect: Mood normal.         Behavior: Behavior normal.         Thought Content: Thought content normal.         Judgment: Judgment normal.         ASSESSMENT and PLAN    ICD-10-CM ICD-9-CM    1. Elevated LFTs  R79.89 790.6 TSH AND FREE T4      HEPATITIS PANEL, ACUTE      METABOLIC PANEL, COMPREHENSIVE      AMYLASE      LIPASE   2. Weight loss  R63.4 783.21    3. Acquired hypothyroidism  E03.9 244.9 TSH AND FREE T4   4. Elevation of levels of liver transaminase levels   R74.01 790.4 HEPATITIS PANEL, ACUTE   5. Hematoma of left lower leg  S80. 12XA 924.10    6. Abnormal urine odor  R82.90 791.9 AMB POC URINALYSIS DIP STICK AUTO W/O MICRO   7. Acute cystitis without hematuria  N30.00 595.0 CULTURE, URINE      CULTURE, URINE      DISCONTINUED: nitrofurantoin, macrocrystal-monohydrate, (Macrobid) 100 mg capsule   8. Hospital discharge follow-up  Z09 V67.59 PA DISCHARGE MEDS RECONCILED W/ CURRENT OUTPATIENT MED LIST     Follow-up and Dispositions    · Return in about 4 weeks (around 7/18/2022) for follow up hematoma.      Pt expressed understanding of visit summary and plans for any follow ups or referrals as well as any medications prescribed.

## 2022-06-20 NOTE — PROGRESS NOTES
Feliciano Pappas is a 78 y.o.  female presents today for office visit for folow up. Pt would also like to discuss hospital follow up. Pt is not fasting. Pt is in Room # 3      1. Have you been to the ER, urgent care clinic since your last visit? Hospitalized since your last visit? Yes 6-10-6-12    2. Have you seen or consulted any other health care providers outside of the [address] [place] [address] since your last visit? Include any pap smears or colon screening. No    Upcoming Appts  none    Health Maintenance reviewed       VORB: No orders of the defined types were placed in this encounter.   BRIANA Carias-Cecil Patel LPN

## 2022-06-21 LAB
HAV IGM SER QL: NEGATIVE
HBV CORE IGM SER QL: NEGATIVE
HBV SURFACE AG SER QL: <0.1 INDEX
HBV SURFACE AG SER QL: NEGATIVE
HCV AB SER IA-ACNC: 0.09 INDEX
HCV AB SERPL QL IA: NEGATIVE
HCV COMMENT,HCGAC: NORMAL
SP1: NORMAL
SP2: NORMAL
SP3: NORMAL

## 2022-06-23 LAB
BACTERIA SPEC CULT: ABNORMAL
CC UR VC: ABNORMAL
SERVICE CMNT-IMP: ABNORMAL

## 2022-06-27 ENCOUNTER — TELEPHONE (OUTPATIENT)
Dept: FAMILY MEDICINE CLINIC | Age: 77
End: 2022-06-27

## 2022-06-27 DIAGNOSIS — J42 CHRONIC BRONCHITIS, UNSPECIFIED CHRONIC BRONCHITIS TYPE (HCC): ICD-10-CM

## 2022-06-27 DIAGNOSIS — E78.00 HYPERCHOLESTEROLEMIA: ICD-10-CM

## 2022-06-27 DIAGNOSIS — S51.811A SKIN TEAR OF FOREARM WITHOUT COMPLICATION, RIGHT, INITIAL ENCOUNTER: ICD-10-CM

## 2022-06-27 DIAGNOSIS — E03.9 ACQUIRED HYPOTHYROIDISM: ICD-10-CM

## 2022-06-27 DIAGNOSIS — N30.00 ACUTE CYSTITIS WITHOUT HEMATURIA: Primary | ICD-10-CM

## 2022-06-27 DIAGNOSIS — K21.9 GASTROESOPHAGEAL REFLUX DISEASE WITHOUT ESOPHAGITIS: ICD-10-CM

## 2022-06-27 RX ORDER — ROSUVASTATIN CALCIUM 40 MG/1
40 TABLET, COATED ORAL
Qty: 90 TABLET | Refills: 1 | Status: SHIPPED | OUTPATIENT
Start: 2022-06-27 | End: 2022-06-28

## 2022-06-27 RX ORDER — LEVOTHYROXINE SODIUM 50 UG/1
50 TABLET ORAL
Qty: 90 TABLET | Refills: 1 | Status: SHIPPED | OUTPATIENT
Start: 2022-06-27

## 2022-06-27 RX ORDER — SILVER SULFADIAZINE 10 G/1000G
CREAM TOPICAL DAILY
Qty: 50 G | Refills: 0 | Status: SHIPPED | OUTPATIENT
Start: 2022-06-27 | End: 2022-08-11 | Stop reason: SDUPTHER

## 2022-06-27 RX ORDER — PHENOL/SODIUM PHENOLATE
20 AEROSOL, SPRAY (ML) MUCOUS MEMBRANE DAILY
Qty: 90 TABLET | Refills: 1 | Status: SHIPPED | OUTPATIENT
Start: 2022-06-27 | End: 2022-10-11 | Stop reason: SDUPTHER

## 2022-06-27 RX ORDER — ALBUTEROL SULFATE 90 UG/1
2 AEROSOL, METERED RESPIRATORY (INHALATION)
Qty: 18 G | Refills: 2 | Status: SHIPPED | OUTPATIENT
Start: 2022-06-27

## 2022-06-27 RX ORDER — SULFAMETHOXAZOLE AND TRIMETHOPRIM 800; 160 MG/1; MG/1
1 TABLET ORAL 2 TIMES DAILY
Qty: 14 TABLET | Refills: 0 | Status: SHIPPED | OUTPATIENT
Start: 2022-06-27 | End: 2022-07-04

## 2022-06-27 NOTE — TELEPHONE ENCOUNTER
Requested Prescriptions     Pending Prescriptions Disp Refills    rosuvastatin (CRESTOR) 40 mg tablet 90 Tablet 1     Sig: Take 1 Tablet by mouth nightly.  levothyroxine (SYNTHROID) 50 mcg tablet 90 Tablet 1     Sig: Take 1 Tablet by mouth Daily (before breakfast).  Omeprazole delayed release (PRILOSEC D/R) 20 mg tablet 90 Tablet 1     Sig: Take 1 Tablet by mouth daily.  silver sulfADIAZINE (SILVADENE) 1 % topical cream 50 g 0     Sig: Apply  to affected area daily.  albuterol (PROVENTIL HFA, VENTOLIN HFA, PROAIR HFA) 90 mcg/actuation inhaler 18 g 2     Sig: Take 2 Puffs by inhalation every four (4) hours as needed for Wheezing.      Future Appointments   Date Time Provider Gui Woodward   7/18/2022 11:00 AM Paulette Bruno PA-C [facility]

## 2022-06-27 NOTE — TELEPHONE ENCOUNTER
Please advise pt her urine cx showed resistance to macrobid so I will send in a different abx (Bactrim) and her other labs were ok

## 2022-06-28 RX ORDER — ROSUVASTATIN CALCIUM 40 MG/1
TABLET, COATED ORAL
Qty: 90 TABLET | Refills: 1 | Status: SHIPPED | OUTPATIENT
Start: 2022-06-28 | End: 2022-10-11 | Stop reason: SDUPTHER

## 2022-07-05 DIAGNOSIS — F03.90 DEMENTIA WITHOUT BEHAVIORAL DISTURBANCE, UNSPECIFIED DEMENTIA TYPE: ICD-10-CM

## 2022-07-07 ENCOUNTER — TELEPHONE (OUTPATIENT)
Dept: FAMILY MEDICINE CLINIC | Age: 77
End: 2022-07-07

## 2022-07-07 RX ORDER — VALBENAZINE 80 MG/1
CAPSULE ORAL
Qty: 30 CAPSULE | Refills: 2 | Status: SHIPPED | OUTPATIENT
Start: 2022-07-07 | End: 2022-09-15

## 2022-07-07 NOTE — TELEPHONE ENCOUNTER
Patient is requesting a prescription for Nistatin powder. She was originally given it in the hospital and is almost out.

## 2022-07-08 RX ORDER — NYSTATIN 100000 [USP'U]/G
POWDER TOPICAL 2 TIMES DAILY
COMMUNITY
Start: 2022-06-12 | End: 2022-07-08 | Stop reason: SDUPTHER

## 2022-07-11 RX ORDER — NYSTATIN 100000 [USP'U]/G
POWDER TOPICAL 2 TIMES DAILY
Qty: 60 G | Refills: 1 | Status: SHIPPED | OUTPATIENT
Start: 2022-07-11 | End: 2022-10-11 | Stop reason: SDUPTHER

## 2022-07-18 ENCOUNTER — HOSPITAL ENCOUNTER (OUTPATIENT)
Dept: LAB | Age: 77
Discharge: HOME | End: 2022-07-18
Payer: MEDICARE

## 2022-07-18 ENCOUNTER — OFFICE VISIT (OUTPATIENT)
Dept: FAMILY MEDICINE CLINIC | Age: 77
End: 2022-07-18
Payer: MEDICARE

## 2022-07-18 VITALS
TEMPERATURE: 98.1 F | HEART RATE: 70 BPM | OXYGEN SATURATION: 97 % | SYSTOLIC BLOOD PRESSURE: 158 MMHG | WEIGHT: 174 LBS | RESPIRATION RATE: 16 BRPM | BODY MASS INDEX: 27.31 KG/M2 | HEIGHT: 67 IN | DIASTOLIC BLOOD PRESSURE: 82 MMHG

## 2022-07-18 DIAGNOSIS — S81.802D OPEN WOUND, LOWER LEG, LEFT, SUBSEQUENT ENCOUNTER: ICD-10-CM

## 2022-07-18 DIAGNOSIS — N30.00 ACUTE CYSTITIS WITHOUT HEMATURIA: Primary | ICD-10-CM

## 2022-07-18 DIAGNOSIS — N30.00 ACUTE CYSTITIS WITHOUT HEMATURIA: ICD-10-CM

## 2022-07-18 DIAGNOSIS — F03.90 DEMENTIA WITHOUT BEHAVIORAL DISTURBANCE, UNSPECIFIED DEMENTIA TYPE: ICD-10-CM

## 2022-07-18 DIAGNOSIS — L03.116 CELLULITIS OF LEFT LOWER EXTREMITY: ICD-10-CM

## 2022-07-18 DIAGNOSIS — S80.12XA HEMATOMA OF LEFT LOWER LEG: ICD-10-CM

## 2022-07-18 LAB
BILIRUB UR QL STRIP: NEGATIVE
GLUCOSE UR-MCNC: NEGATIVE MG/DL
KETONES P FAST UR STRIP-MCNC: NEGATIVE MG/DL
PH UR STRIP: 8.5 [PH] (ref 4.6–8)
PROT UR QL STRIP: ABNORMAL
SP GR UR STRIP: 1.01 (ref 1–1.03)
UA UROBILINOGEN AMB POC: ABNORMAL (ref 0.2–1)
URINALYSIS CLARITY POC: ABNORMAL
URINALYSIS COLOR POC: YELLOW
URINE BLOOD POC: NEGATIVE
URINE LEUKOCYTES POC: ABNORMAL
URINE NITRITES POC: POSITIVE

## 2022-07-18 PROCEDURE — 87077 CULTURE AEROBIC IDENTIFY: CPT

## 2022-07-18 PROCEDURE — 99214 OFFICE O/P EST MOD 30 MIN: CPT | Performed by: PHYSICIAN ASSISTANT

## 2022-07-18 PROCEDURE — G8753 SYS BP > OR = 140: HCPCS | Performed by: PHYSICIAN ASSISTANT

## 2022-07-18 PROCEDURE — G8427 DOCREV CUR MEDS BY ELIG CLIN: HCPCS | Performed by: PHYSICIAN ASSISTANT

## 2022-07-18 PROCEDURE — G9717 DOC PT DX DEP/BP F/U NT REQ: HCPCS | Performed by: PHYSICIAN ASSISTANT

## 2022-07-18 PROCEDURE — 87075 CULTR BACTERIA EXCEPT BLOOD: CPT

## 2022-07-18 PROCEDURE — 3017F COLORECTAL CA SCREEN DOC REV: CPT | Performed by: PHYSICIAN ASSISTANT

## 2022-07-18 PROCEDURE — G8536 NO DOC ELDER MAL SCRN: HCPCS | Performed by: PHYSICIAN ASSISTANT

## 2022-07-18 PROCEDURE — G8754 DIAS BP LESS 90: HCPCS | Performed by: PHYSICIAN ASSISTANT

## 2022-07-18 PROCEDURE — G8417 CALC BMI ABV UP PARAM F/U: HCPCS | Performed by: PHYSICIAN ASSISTANT

## 2022-07-18 PROCEDURE — 1090F PRES/ABSN URINE INCON ASSESS: CPT | Performed by: PHYSICIAN ASSISTANT

## 2022-07-18 PROCEDURE — 1123F ACP DISCUSS/DSCN MKR DOCD: CPT | Performed by: PHYSICIAN ASSISTANT

## 2022-07-18 PROCEDURE — 87205 SMEAR GRAM STAIN: CPT

## 2022-07-18 PROCEDURE — 87086 URINE CULTURE/COLONY COUNT: CPT

## 2022-07-18 PROCEDURE — G8399 PT W/DXA RESULTS DOCUMENT: HCPCS | Performed by: PHYSICIAN ASSISTANT

## 2022-07-18 PROCEDURE — 87186 SC STD MICRODIL/AGAR DIL: CPT

## 2022-07-18 PROCEDURE — 81003 URINALYSIS AUTO W/O SCOPE: CPT | Performed by: PHYSICIAN ASSISTANT

## 2022-07-18 PROCEDURE — 1101F PT FALLS ASSESS-DOCD LE1/YR: CPT | Performed by: PHYSICIAN ASSISTANT

## 2022-07-18 RX ORDER — CEPHALEXIN 500 MG/1
500 CAPSULE ORAL 4 TIMES DAILY
Qty: 40 CAPSULE | Refills: 0 | Status: SHIPPED | OUTPATIENT
Start: 2022-07-18 | End: 2022-07-28

## 2022-07-18 RX ORDER — NITROFURANTOIN 25; 75 MG/1; MG/1
100 CAPSULE ORAL 2 TIMES DAILY
Qty: 14 CAPSULE | Refills: 0 | Status: SHIPPED | OUTPATIENT
Start: 2022-07-18 | End: 2022-07-18 | Stop reason: ALTCHOICE

## 2022-07-18 NOTE — PATIENT INSTRUCTIONS
Hematoma: Care Instructions  Overview     A hematoma is a bad bruise. It happens when an injury causes blood to collect and pool under the skin. The pooling blood gives the skin a spongy, rubbery, lumpy feel. A hematoma usually is not a cause for concern. It is not the same thing as a blood clot in a vein, and it does not cause blood clots. Follow-up care is a key part of your treatment and safety. Be sure to make and go to all appointments, and call your doctor if you are having problems. It's also a good idea to know your test results and keep a list of the medicines you take. How can you care for yourself at home? · Rest and protect the bruised area. · Put ice or a cold pack on the area for 10 to 20 minutes at a time. · Prop up the bruised area on a pillow when you ice it or anytime you sit or lie down during the next 3 days. Try to keep it above the level of your heart. This will help reduce swelling. · Wrapping the bruised area with an elastic bandage such as an Ace wrap will help decrease swelling. Don't wrap it too tightly, as this can cause more swelling below the affected area. · Be safe with medicines. Read and follow all instructions on the label. ? If the doctor gave you a prescription medicine for pain, take it as prescribed. ? If you are not taking a prescription pain medicine, ask your doctor if you can take an over-the-counter medicine. · Do not take two or more pain medicines at the same time unless the doctor told you to. Many pain medicines have acetaminophen, which is Tylenol. Too much acetaminophen (Tylenol) can be harmful. When should you call for help? Call your doctor now or seek immediate medical care if:    · You have signs of skin infection, such as:  ? Increased pain, swelling, warmth, or redness. ? Red streaks leading from the area. ? Pus draining from the area. ? A fever.    Watch closely for changes in your health, and be sure to contact your doctor if:    · The bruise lasts longer than 4 weeks.     · The bruise gets bigger or becomes more painful.     · You do not get better as expected. Where can you learn more? Go to http://juan alberto-marina.info/  Enter P911 in the search box to learn more about \"Hematoma: Care Instructions. \"  Current as of: July 1, 2021               Content Version: 13.2  © 2006-2022 [employer]. Care instructions adapted under license by [employer] (which disclaims liability or warranty for this information). If you have questions about a medical condition or this instruction, always ask your healthcare professional. [place] [address] any warranty or liability for your use of this information.

## 2022-07-18 NOTE — PROGRESS NOTES
Shira Peterson is a 78 y.o.  female presents today for office visit for follow up. Pt would also like to discuss wound check. Pt is not fasting. Pt is in Room # 3      1. Have you been to the ER, urgent care clinic since your last visit? Hospitalized since your last visit? No    2. Have you seen or consulted any other health care providers outside of the [address] [place] [address] since your last visit? Include any pap smears or colon screening. No    Upcoming Appts  none    Health Maintenance reviewed    VORB: No orders of the defined types were placed in this encounter.   BRIANA Mckoy-Cecil Wu LPN

## 2022-07-19 ENCOUNTER — TELEPHONE (OUTPATIENT)
Dept: FAMILY MEDICINE CLINIC | Age: 77
End: 2022-07-19

## 2022-07-19 NOTE — TELEPHONE ENCOUNTER
Please advise pt her urine cx still showed infection but it is improved and the wound cx was ok. She should continue abx and current wound care.

## 2022-07-20 LAB
BACTERIA SPEC CULT: NORMAL
SERVICE CMNT-IMP: NORMAL

## 2022-07-21 LAB
BACTERIA SPEC CULT: ABNORMAL
GRAM STN SPEC: ABNORMAL
GRAM STN SPEC: ABNORMAL
SERVICE CMNT-IMP: ABNORMAL

## 2022-07-22 LAB
BACTERIA SPEC CULT: ABNORMAL
CC UR VC: ABNORMAL
SERVICE CMNT-IMP: ABNORMAL

## 2022-08-11 DIAGNOSIS — S51.811A SKIN TEAR OF FOREARM WITHOUT COMPLICATION, RIGHT, INITIAL ENCOUNTER: ICD-10-CM

## 2022-08-11 RX ORDER — SILVER SULFADIAZINE 10 G/1000G
CREAM TOPICAL DAILY
Qty: 50 G | Refills: 0 | Status: SHIPPED | OUTPATIENT
Start: 2022-08-11

## 2022-08-15 ENCOUNTER — OFFICE VISIT (OUTPATIENT)
Dept: FAMILY MEDICINE CLINIC | Age: 77
End: 2022-08-15
Payer: MEDICARE

## 2022-08-15 ENCOUNTER — HOSPITAL ENCOUNTER (OUTPATIENT)
Dept: LAB | Age: 77
Discharge: HOME | End: 2022-08-15
Payer: MEDICARE

## 2022-08-15 VITALS
OXYGEN SATURATION: 94 % | RESPIRATION RATE: 16 BRPM | SYSTOLIC BLOOD PRESSURE: 140 MMHG | WEIGHT: 183 LBS | HEART RATE: 73 BPM | DIASTOLIC BLOOD PRESSURE: 90 MMHG | BODY MASS INDEX: 28.72 KG/M2 | TEMPERATURE: 97.3 F | HEIGHT: 67 IN

## 2022-08-15 DIAGNOSIS — S81.802D OPEN WOUND, LOWER LEG, LEFT, SUBSEQUENT ENCOUNTER: ICD-10-CM

## 2022-08-15 DIAGNOSIS — R41.82 ALTERED MENTAL STATUS, UNSPECIFIED ALTERED MENTAL STATUS TYPE: ICD-10-CM

## 2022-08-15 DIAGNOSIS — L03.116 CELLULITIS OF LEFT LOWER EXTREMITY: Primary | ICD-10-CM

## 2022-08-15 DIAGNOSIS — L03.116 CELLULITIS OF LEFT LOWER EXTREMITY: ICD-10-CM

## 2022-08-15 LAB
ALBUMIN SERPL-MCNC: 3.5 G/DL (ref 3.4–5)
ALBUMIN/GLOB SERPL: 1.3 {RATIO} (ref 0.8–1.7)
ALP SERPL-CCNC: 115 U/L (ref 45–117)
ALT SERPL-CCNC: 28 U/L (ref 13–56)
ANION GAP SERPL CALC-SCNC: 2 MMOL/L (ref 3–18)
AST SERPL-CCNC: 28 U/L (ref 10–38)
BASOPHILS # BLD: 0 K/UL (ref 0–0.1)
BASOPHILS NFR BLD: 1 % (ref 0–2)
BILIRUB SERPL-MCNC: 0.5 MG/DL (ref 0.2–1)
BUN SERPL-MCNC: 29 MG/DL (ref 7–18)
BUN/CREAT SERPL: 22 (ref 12–20)
CALCIUM SERPL-MCNC: 9.1 MG/DL (ref 8.5–10.1)
CHLORIDE SERPL-SCNC: 103 MMOL/L (ref 100–111)
CO2 SERPL-SCNC: 33 MMOL/L (ref 21–32)
CREAT SERPL-MCNC: 1.32 MG/DL (ref 0.6–1.3)
DIFFERENTIAL METHOD BLD: ABNORMAL
EOSINOPHIL # BLD: 0 K/UL (ref 0–0.4)
EOSINOPHIL NFR BLD: 1 % (ref 0–5)
ERYTHROCYTE [DISTWIDTH] IN BLOOD BY AUTOMATED COUNT: 15.5 % (ref 11.6–14.5)
GLOBULIN SER CALC-MCNC: 2.7 G/DL (ref 2–4)
GLUCOSE SERPL-MCNC: 80 MG/DL (ref 74–99)
HCT VFR BLD AUTO: 36.2 % (ref 35–45)
HGB BLD-MCNC: 11.4 G/DL (ref 12–16)
IMM GRANULOCYTES # BLD AUTO: 0 K/UL (ref 0–0.04)
IMM GRANULOCYTES NFR BLD AUTO: 0 % (ref 0–0.5)
LYMPHOCYTES # BLD: 0.6 K/UL (ref 0.9–3.6)
LYMPHOCYTES NFR BLD: 11 % (ref 21–52)
MCH RBC QN AUTO: 31.1 PG (ref 24–34)
MCHC RBC AUTO-ENTMCNC: 31.5 G/DL (ref 31–37)
MCV RBC AUTO: 98.6 FL (ref 78–100)
MONOCYTES # BLD: 0.3 K/UL (ref 0.05–1.2)
MONOCYTES NFR BLD: 6 % (ref 3–10)
NEUTS SEG # BLD: 4.9 K/UL (ref 1.8–8)
NEUTS SEG NFR BLD: 82 % (ref 40–73)
NRBC # BLD: 0 K/UL (ref 0–0.01)
NRBC BLD-RTO: 0 PER 100 WBC
PLATELET # BLD AUTO: 204 K/UL (ref 135–420)
PMV BLD AUTO: 11.3 FL (ref 9.2–11.8)
POTASSIUM SERPL-SCNC: 4.6 MMOL/L (ref 3.5–5.5)
PROT SERPL-MCNC: 6.2 G/DL (ref 6.4–8.2)
RBC # BLD AUTO: 3.67 M/UL (ref 4.2–5.3)
SODIUM SERPL-SCNC: 138 MMOL/L (ref 136–145)
WBC # BLD AUTO: 6 K/UL (ref 4.6–13.2)

## 2022-08-15 PROCEDURE — 87075 CULTR BACTERIA EXCEPT BLOOD: CPT

## 2022-08-15 PROCEDURE — 1090F PRES/ABSN URINE INCON ASSESS: CPT | Performed by: PHYSICIAN ASSISTANT

## 2022-08-15 PROCEDURE — 87086 URINE CULTURE/COLONY COUNT: CPT

## 2022-08-15 PROCEDURE — 80053 COMPREHEN METABOLIC PANEL: CPT

## 2022-08-15 PROCEDURE — G8753 SYS BP > OR = 140: HCPCS | Performed by: PHYSICIAN ASSISTANT

## 2022-08-15 PROCEDURE — 87205 SMEAR GRAM STAIN: CPT

## 2022-08-15 PROCEDURE — G8536 NO DOC ELDER MAL SCRN: HCPCS | Performed by: PHYSICIAN ASSISTANT

## 2022-08-15 PROCEDURE — G8427 DOCREV CUR MEDS BY ELIG CLIN: HCPCS | Performed by: PHYSICIAN ASSISTANT

## 2022-08-15 PROCEDURE — 85025 COMPLETE CBC W/AUTO DIFF WBC: CPT

## 2022-08-15 PROCEDURE — 99214 OFFICE O/P EST MOD 30 MIN: CPT | Performed by: PHYSICIAN ASSISTANT

## 2022-08-15 PROCEDURE — 1123F ACP DISCUSS/DSCN MKR DOCD: CPT | Performed by: PHYSICIAN ASSISTANT

## 2022-08-15 PROCEDURE — G8417 CALC BMI ABV UP PARAM F/U: HCPCS | Performed by: PHYSICIAN ASSISTANT

## 2022-08-15 PROCEDURE — 3017F COLORECTAL CA SCREEN DOC REV: CPT | Performed by: PHYSICIAN ASSISTANT

## 2022-08-15 PROCEDURE — G8399 PT W/DXA RESULTS DOCUMENT: HCPCS | Performed by: PHYSICIAN ASSISTANT

## 2022-08-15 PROCEDURE — 36415 COLL VENOUS BLD VENIPUNCTURE: CPT

## 2022-08-15 NOTE — PROGRESS NOTES
Dhara Myers is a 78 y.o.  female presents today for office visit for follow up. Pt would also like to discuss wound check. Pt is not fasting. Pt is in Room # 5      1. Have you been to the ER, urgent care clinic since your last visit? Hospitalized since your last visit? No    2. Have you seen or consulted any other health care providers outside of the [address] [place] [address] since your last visit? Include any pap smears or colon screening. No    Upcoming Appts  none    Health Maintenance reviewed     VORB: No orders of the defined types were placed in this encounter.   BRIANA Aguilera-Cecil Calvillo LPN

## 2022-08-15 NOTE — PROGRESS NOTES
HISTORY OF PRESENT ILLNESS  Ca Sutherland is a 78 y.o. female. HPI  Pt is being seen for f/u on her lower leg hematoma. She is with her daughter and feels it is better overall but has been more red lately and a little tender. She also has had urinary frequency and burning for the past few days so will check a urine. She denies flank pain, fever, chills, N/V/D, and hematuria. Allergies   Allergen Reactions    Erythromycin Rash    Gabapentin Other (comments)     Psych/hallucinations    Ibuprofen Other (comments)     Patient states unable to take d/t kidneys      Prednisone Hives     States can't take just prednisone but mixes are ok       Current Outpatient Medications   Medication Sig    nystatin (MYCOSTATIN) powder Apply  to affected area two (2) times a day. Apply  to affected area two (2) times a day. Ingrezza 80 mg cap TAKE ONE CAPSULE BY MOUTH DAILY    rosuvastatin (CRESTOR) 40 mg tablet TAKE 1 TABLET EVERY NIGHT    levothyroxine (SYNTHROID) 50 mcg tablet Take 1 Tablet by mouth Daily (before breakfast). Omeprazole delayed release (PRILOSEC D/R) 20 mg tablet Take 1 Tablet by mouth daily. albuterol (PROVENTIL HFA, VENTOLIN HFA, PROAIR HFA) 90 mcg/actuation inhaler Take 2 Puffs by inhalation every four (4) hours as needed for Wheezing. furosemide (Lasix) 40 mg tablet Take  by mouth daily. meclizine (ANTIVERT) 25 mg tablet Take  by mouth three (3) times daily as needed for Dizziness. ibandronate (Boniva) 150 mg tablet Take 150 mg by mouth every thirty (30) days. vitamin E (AQUA GEMS) 400 unit capsule Take 400 Units by mouth two (2) times a day. omega 3-dha-epa-fish oil 60- mg cap Take 400 mg by mouth. Latuda 80 mg tab tablet Take 1 Tablet by mouth daily. doxepin (SINEquan) 50 mg capsule Take  by mouth nightly. montelukast (SINGULAIR) 10 mg tablet Take 1 Tablet by mouth daily.     rOPINIRole (REQUIP) 0.5 mg tablet TAKE 1 TABLET THREE TIMES DAILY    ergocalciferol (ERGOCALCIFEROL) 1,250 mcg (50,000 unit) capsule Take 50,000 Units by mouth every seven (7) days. budesonide (ENTOCORT EC) 3 mg capsule Take 9 mg by mouth daily. cyanocobalamin (VITAMIN B12) 1,000 mcg/mL injection 1 mL by IntraMUSCular route every fourteen (14) days. ferrous sulfate (IRON) 325 mg (65 mg iron) cpER Take  by mouth.    magnesium oxide (MAG-OX) 400 mg tablet Take 400 mg by mouth.    lamoTRIgine (LAMICTAL) 200 mg tablet Take 200 mg by mouth two (2) times a day. silver sulfADIAZINE (SILVADENE) 1 % topical cream Apply  to affected area daily. No current facility-administered medications for this visit. Review of Systems   Constitutional:  Positive for malaise/fatigue and weight loss. Negative for chills and fever. HENT:  Negative for congestion, ear pain, sinus pain, sore throat and tinnitus. Eyes: Negative. Respiratory:  Positive for shortness of breath (chronic). Negative for cough, hemoptysis, sputum production and wheezing. Cardiovascular:  Positive for leg swelling (hematoma of left lower leg - improved). Negative for chest pain and palpitations. Gastrointestinal:  Positive for heartburn (controlled on meds). Negative for abdominal pain, blood in stool, constipation, diarrhea, melena, nausea and vomiting. Genitourinary:  Positive for dysuria and frequency. Negative for flank pain, hematuria and urgency. Musculoskeletal:  Positive for back pain (chronic) and joint pain (left hip). Skin:  Negative for itching and rash. Neurological:  Negative for dizziness and headaches. Psychiatric/Behavioral:  Positive for memory loss (pt has dementia). Negative for depression. The patient is not nervous/anxious.     Visit Vitals  BP (!) 158/82 (BP 1 Location: Left upper arm, BP Patient Position: Sitting)   Pulse 70   Temp 98.1 °F (36.7 °C) (Temporal)   Resp 16   Ht 5' 7\" (1.702 m)   Wt 174 lb (78.9 kg)   SpO2 97%   BMI 27.25 kg/m²       Physical Exam  Constitutional: Appearance: Normal appearance. HENT:      Head: Normocephalic and atraumatic. Cardiovascular:      Pulses: Normal pulses. Heart sounds: Normal heart sounds. Pulmonary:      Effort: Pulmonary effort is normal.      Breath sounds: Normal breath sounds. Skin:     Comments: Hematoma of left lower leg with some erythema and slight edema but smaller in size   Neurological:      Mental Status: She is alert and oriented to person, place, and time. Psychiatric:         Mood and Affect: Mood normal.         Behavior: Behavior normal.         Thought Content: Thought content normal.         Judgment: Judgment normal.       ASSESSMENT and PLAN    ICD-10-CM ICD-9-CM    1. Acute cystitis without hematuria  N30.00 595.0 AMB POC URINALYSIS DIP STICK AUTO W/O MICRO      CULTURE, URINE      cephALEXin (KEFLEX) 500 mg capsule      DISCONTINUED: nitrofurantoin, macrocrystal-monohydrate, (Macrobid) 100 mg capsule      2. Cellulitis of left lower extremity  L03.116 682.6 cephALEXin (KEFLEX) 500 mg capsule      AEROBIC/ANAEROBIC CULTURE      3. Open wound, lower leg, left, subsequent encounter  S81.802D V58.89 AEROBIC/ANAEROBIC CULTURE     891.0       4. Hematoma of left lower leg  S80. 12XA 924.10       5. Dementia without behavioral disturbance, unspecified dementia type ([facility]  F03.90 294.20         Follow-up and Dispositions    Return in about 4 weeks (around 8/15/2022) for follow up. Pt expressed understanding of visit summary and plans for any follow ups or referrals as well as any medications prescribed.

## 2022-08-15 NOTE — PROGRESS NOTES
HISTORY OF PRESENT ILLNESS  Marco Antonio Mcdowell is a 78 y.o. female. HPI  Pt is being seen for f/u on her lower left leg wound/hematoma. It seems to be better overall however the past 2 days has had a little more redness. Pts daughter has also noticed a change in her mental status and states that is usually common when her mother has an infection like a UTI. Pt denies fever, chills, urinary frequency, dysuria, hematuria, abd/pelvic pain, and flank pain. Will check urine and cx wound. Pt will continue with Cleaning w 1/4 strength dakins and applying silvadene. Allergies   Allergen Reactions    Erythromycin Rash    Gabapentin Other (comments)     Psych/hallucinations    Ibuprofen Other (comments)     Patient states unable to take d/t kidneys      Prednisone Hives     States can't take just prednisone but mixes are ok       Current Outpatient Medications   Medication Sig    cephALEXin (KEFLEX) 500 mg capsule Take 1 Capsule by mouth two (2) times a day for 10 days. silver sulfADIAZINE (SILVADENE) 1 % topical cream Apply  to affected area daily. nystatin (MYCOSTATIN) powder Apply  to affected area two (2) times a day. Apply  to affected area two (2) times a day. Ingrezza 80 mg cap TAKE ONE CAPSULE BY MOUTH DAILY    rosuvastatin (CRESTOR) 40 mg tablet TAKE 1 TABLET EVERY NIGHT    levothyroxine (SYNTHROID) 50 mcg tablet Take 1 Tablet by mouth Daily (before breakfast). Omeprazole delayed release (PRILOSEC D/R) 20 mg tablet Take 1 Tablet by mouth daily. albuterol (PROVENTIL HFA, VENTOLIN HFA, PROAIR HFA) 90 mcg/actuation inhaler Take 2 Puffs by inhalation every four (4) hours as needed for Wheezing. furosemide (LASIX) 40 mg tablet Take  by mouth daily. meclizine (ANTIVERT) 25 mg tablet Take  by mouth three (3) times daily as needed for Dizziness. ibandronate (Boniva) 150 mg tablet Take 150 mg by mouth every thirty (30) days.     vitamin E (AQUA GEMS) 400 unit capsule Take 400 Units by mouth two (2) times a day. omega 3-dha-epa-fish oil 60- mg cap Take 400 mg by mouth. Latuda 80 mg tab tablet Take 1 Tablet by mouth daily. doxepin (SINEquan) 50 mg capsule Take  by mouth nightly. montelukast (SINGULAIR) 10 mg tablet Take 1 Tablet by mouth daily. rOPINIRole (REQUIP) 0.5 mg tablet TAKE 1 TABLET THREE TIMES DAILY    ergocalciferol (ERGOCALCIFEROL) 1,250 mcg (50,000 unit) capsule Take 50,000 Units by mouth every seven (7) days. budesonide (ENTOCORT EC) 3 mg capsule Take 9 mg by mouth daily. cyanocobalamin (VITAMIN B12) 1,000 mcg/mL injection 1 mL by IntraMUSCular route every fourteen (14) days. ferrous sulfate 325 mg (65 mg iron) cpER Take  by mouth.    magnesium oxide (MAG-OX) 400 mg tablet Take 400 mg by mouth.    lamoTRIgine (LaMICtal) 200 mg tablet Take 200 mg by mouth two (2) times a day. No current facility-administered medications for this visit. Review of Systems   Constitutional:  Positive for malaise/fatigue and weight loss. Negative for chills and fever. HENT:  Negative for congestion, ear pain, sinus pain, sore throat and tinnitus. Eyes: Negative. Respiratory:  Positive for shortness of breath (chronic). Negative for cough, hemoptysis, sputum production and wheezing. Cardiovascular:  Positive for leg swelling (hematoma of left lower leg - improved). Negative for chest pain and palpitations. Gastrointestinal:  Positive for heartburn (controlled on meds). Negative for abdominal pain, blood in stool, constipation, diarrhea, melena, nausea and vomiting. Genitourinary:  Positive for frequency. Negative for dysuria, flank pain, hematuria and urgency. Musculoskeletal:  Positive for back pain (chronic) and joint pain (left hip). Skin:  Negative for itching and rash. Neurological:  Negative for dizziness and headaches. Psychiatric/Behavioral:  Positive for memory loss (pt has dementia). Negative for depression. The patient is not nervous/anxious. Visit Vitals  BP (!) 140/90 (BP 1 Location: Right upper arm, BP Patient Position: Sitting)   Pulse 73   Temp 97.3 °F (36.3 °C) (Temporal)   Resp 16   Ht 5' 7\" (1.702 m)   Wt 183 lb (83 kg)   SpO2 94%   BMI 28.66 kg/m²       Physical Exam  Constitutional:       Appearance: Normal appearance. HENT:      Head: Normocephalic and atraumatic. Cardiovascular:      Rate and Rhythm: Normal rate and regular rhythm. Pulses: Normal pulses. Heart sounds: Normal heart sounds. Pulmonary:      Effort: Pulmonary effort is normal.      Breath sounds: Normal breath sounds. Skin:         Neurological:      Mental Status: She is alert and oriented to person, place, and time. Psychiatric:         Mood and Affect: Mood normal.         Behavior: Behavior normal.         Thought Content: Thought content normal.         Judgment: Judgment normal.       ASSESSMENT and PLAN    ICD-10-CM ICD-9-CM    1. Cellulitis of left lower extremity  L03.116 682.6 AMB POC URINALYSIS DIP STICK AUTO W/O MICRO      CULTURE, URINE      METABOLIC PANEL, COMPREHENSIVE      CBC WITH AUTOMATED DIFF      AEROBIC/ANAEROBIC CULTURE      2. Open wound, lower leg, left, subsequent encounter  S81.802D V58.89 AMB POC URINALYSIS DIP STICK AUTO W/O MICRO     891.0 CULTURE, URINE      METABOLIC PANEL, COMPREHENSIVE      CBC WITH AUTOMATED DIFF      AEROBIC/ANAEROBIC CULTURE      3. Altered mental status, unspecified altered mental status type  R41.82 780.97 AMB POC URINALYSIS DIP STICK AUTO W/O MICRO      CULTURE, URINE      METABOLIC PANEL, COMPREHENSIVE      CBC WITH AUTOMATED DIFF      AEROBIC/ANAEROBIC CULTURE        Follow-up and Dispositions    Return in about 2 weeks (around 8/29/2022) for follow up. Pt expressed understanding of visit summary and plans for any follow ups or referrals as well as any medications prescribed.

## 2022-08-16 LAB
BACTERIA SPEC CULT: NORMAL
SERVICE CMNT-IMP: NORMAL

## 2022-08-19 ENCOUNTER — TELEPHONE (OUTPATIENT)
Dept: FAMILY MEDICINE CLINIC | Age: 77
End: 2022-08-19

## 2022-08-19 DIAGNOSIS — L03.116 CELLULITIS OF LEFT LOWER EXTREMITY: Primary | ICD-10-CM

## 2022-08-19 NOTE — TELEPHONE ENCOUNTER
Patient states that her leg is is warm and red and she would like to have keflex sent over to the pharmacy

## 2022-08-20 LAB
BACTERIA SPEC CULT: NORMAL
BACTERIA SPEC CULT: NORMAL
GRAM STN SPEC: NORMAL
GRAM STN SPEC: NORMAL
SERVICE CMNT-IMP: NORMAL
SERVICE CMNT-IMP: NORMAL

## 2022-08-26 RX ORDER — CEPHALEXIN 500 MG/1
500 CAPSULE ORAL 2 TIMES DAILY
Qty: 20 CAPSULE | Refills: 0 | Status: SHIPPED | OUTPATIENT
Start: 2022-08-26 | End: 2022-09-05

## 2022-08-26 NOTE — TELEPHONE ENCOUNTER
Patient is calling to state that when they went to change her dressing today and the wound is still all red.

## 2022-08-29 ENCOUNTER — OFFICE VISIT (OUTPATIENT)
Dept: FAMILY MEDICINE CLINIC | Age: 77
End: 2022-08-29
Payer: MEDICARE

## 2022-08-29 VITALS
BODY MASS INDEX: 28.72 KG/M2 | TEMPERATURE: 97.2 F | SYSTOLIC BLOOD PRESSURE: 103 MMHG | HEIGHT: 67 IN | HEART RATE: 78 BPM | DIASTOLIC BLOOD PRESSURE: 60 MMHG | RESPIRATION RATE: 15 BRPM | WEIGHT: 183 LBS | OXYGEN SATURATION: 95 %

## 2022-08-29 DIAGNOSIS — S81.802D OPEN WOUND, LOWER LEG, LEFT, SUBSEQUENT ENCOUNTER: Primary | ICD-10-CM

## 2022-08-29 DIAGNOSIS — I95.9 HYPOTENSION, UNSPECIFIED HYPOTENSION TYPE: ICD-10-CM

## 2022-08-29 DIAGNOSIS — F03.90 DEMENTIA WITHOUT BEHAVIORAL DISTURBANCE, UNSPECIFIED DEMENTIA TYPE: ICD-10-CM

## 2022-08-29 DIAGNOSIS — R05.9 COUGH: ICD-10-CM

## 2022-08-29 DIAGNOSIS — R60.0 EDEMA, LOWER EXTREMITY: ICD-10-CM

## 2022-08-29 PROCEDURE — G8752 SYS BP LESS 140: HCPCS | Performed by: PHYSICIAN ASSISTANT

## 2022-08-29 PROCEDURE — G9717 DOC PT DX DEP/BP F/U NT REQ: HCPCS | Performed by: PHYSICIAN ASSISTANT

## 2022-08-29 PROCEDURE — 99214 OFFICE O/P EST MOD 30 MIN: CPT | Performed by: PHYSICIAN ASSISTANT

## 2022-08-29 PROCEDURE — G8427 DOCREV CUR MEDS BY ELIG CLIN: HCPCS | Performed by: PHYSICIAN ASSISTANT

## 2022-08-29 PROCEDURE — G8536 NO DOC ELDER MAL SCRN: HCPCS | Performed by: PHYSICIAN ASSISTANT

## 2022-08-29 PROCEDURE — G8754 DIAS BP LESS 90: HCPCS | Performed by: PHYSICIAN ASSISTANT

## 2022-08-29 PROCEDURE — 1090F PRES/ABSN URINE INCON ASSESS: CPT | Performed by: PHYSICIAN ASSISTANT

## 2022-08-29 PROCEDURE — G8399 PT W/DXA RESULTS DOCUMENT: HCPCS | Performed by: PHYSICIAN ASSISTANT

## 2022-08-29 PROCEDURE — 1123F ACP DISCUSS/DSCN MKR DOCD: CPT | Performed by: PHYSICIAN ASSISTANT

## 2022-08-29 PROCEDURE — 1101F PT FALLS ASSESS-DOCD LE1/YR: CPT | Performed by: PHYSICIAN ASSISTANT

## 2022-08-29 PROCEDURE — 3017F COLORECTAL CA SCREEN DOC REV: CPT | Performed by: PHYSICIAN ASSISTANT

## 2022-08-29 PROCEDURE — G8417 CALC BMI ABV UP PARAM F/U: HCPCS | Performed by: PHYSICIAN ASSISTANT

## 2022-08-29 NOTE — PROGRESS NOTES
Rosario Peña is a 78 y.o.  female presents today for office visit for follow up. Pt would also like to discuss wound care. Pt is not fasting. Pt is in Room # 3      1. Have you been to the ER, urgent care clinic since your last visit? Hospitalized since your last visit? No    2. Have you seen or consulted any other health care providers outside of the [address] [place] [address] since your last visit? Include any pap smears or colon screening. No    Upcoming Appts  none    Health Maintenance reviewed -    VORB: No orders of the defined types were placed in this encounter.   BRIANA Lira-Cecil Morelos LPN

## 2022-08-30 NOTE — PROGRESS NOTES
HISTORY OF PRESENT ILLNESS  Lay Estevez is a 78 y.o. female. HPI  Pt is being seen for f/u on her lower left leg wound. Her daughter feels it has more redness the past 2 days. Size has decreased slightly. Pt denies pain but has had a cough the past 2d and worsening malaise. She had a neg COVID test this AM. Her wound cx from 2 weeks ago was normal. Denies fever, chills, CP, palpitations, worsening SOB from her norm, and worsening edema from her norm. Her BP is lower than usual and her leg is cool to touch. She was referred to vascular last yr for her edema but never went. Will call today and get her an appt as there is some concern for DVT. Will also order HH to assist with caring for wound as well as memory care for her dementia. Allergies   Allergen Reactions    Erythromycin Rash    Gabapentin Other (comments)     Psych/hallucinations    Ibuprofen Other (comments)     Patient states unable to take d/t kidneys      Prednisone Hives     States can't take just prednisone but mixes are ok       Current Outpatient Medications   Medication Sig    silver sulfADIAZINE (SILVADENE) 1 % topical cream Apply  to affected area daily. nystatin (MYCOSTATIN) powder Apply  to affected area two (2) times a day. Apply  to affected area two (2) times a day. rosuvastatin (CRESTOR) 40 mg tablet TAKE 1 TABLET EVERY NIGHT    levothyroxine (SYNTHROID) 50 mcg tablet Take 1 Tablet by mouth Daily (before breakfast). Omeprazole delayed release (PRILOSEC D/R) 20 mg tablet Take 1 Tablet by mouth daily. albuterol (PROVENTIL HFA, VENTOLIN HFA, PROAIR HFA) 90 mcg/actuation inhaler Take 2 Puffs by inhalation every four (4) hours as needed for Wheezing. furosemide (LASIX) 40 mg tablet Take  by mouth daily. meclizine (ANTIVERT) 25 mg tablet Take  by mouth three (3) times daily as needed for Dizziness. ibandronate (Boniva) 150 mg tablet Take 150 mg by mouth every thirty (30) days.     vitamin E (AQUA GEMS) 400 unit capsule Take 400 Units by mouth two (2) times a day. omega 3-dha-epa-fish oil 60- mg cap Take 400 mg by mouth. Latuda 80 mg tab tablet Take 1 Tablet by mouth daily. doxepin (SINEquan) 50 mg capsule Take  by mouth nightly. montelukast (SINGULAIR) 10 mg tablet Take 1 Tablet by mouth daily. rOPINIRole (REQUIP) 0.5 mg tablet TAKE 1 TABLET THREE TIMES DAILY    ergocalciferol (ERGOCALCIFEROL) 1,250 mcg (50,000 unit) capsule Take 50,000 Units by mouth every seven (7) days. budesonide (ENTOCORT EC) 3 mg capsule Take 9 mg by mouth daily. cyanocobalamin (VITAMIN B12) 1,000 mcg/mL injection 1 mL by IntraMUSCular route every fourteen (14) days. ferrous sulfate 325 mg (65 mg iron) cpER Take  by mouth.    magnesium oxide (MAG-OX) 400 mg tablet Take 400 mg by mouth.    lamoTRIgine (LaMICtal) 200 mg tablet Take 200 mg by mouth two (2) times a day. Ingrezza 80 mg cap TAKE ONE CAPSULE BY MOUTH DAILY     No current facility-administered medications for this visit. Review of Systems   Constitutional:  Positive for malaise/fatigue (worse past 2 days) and weight loss. Negative for chills and fever. HENT:  Negative for congestion, ear pain, sinus pain, sore throat and tinnitus. Eyes: Negative. Respiratory:  Positive for cough and shortness of breath (chronic but not worse than usu). Negative for hemoptysis, sputum production and wheezing. Cardiovascular:  Positive for leg swelling (hematoma of left lower leg - improved). Negative for chest pain and palpitations. Gastrointestinal:  Positive for heartburn (controlled on meds). Negative for abdominal pain, blood in stool, constipation, diarrhea, melena, nausea and vomiting. Genitourinary:  Negative for dysuria, flank pain, frequency, hematuria and urgency. Musculoskeletal:  Positive for back pain (chronic) and joint pain (left hip). Skin:  Negative for itching and rash. Neurological:  Negative for dizziness and headaches. Psychiatric/Behavioral:  Positive for memory loss (pt has dementia). Negative for depression. The patient is not nervous/anxious. Visit Vitals  /60 (BP 1 Location: Left upper arm, BP Patient Position: Sitting)   Pulse 78   Temp 97.2 °F (36.2 °C) (Temporal)   Resp 15   Ht 5' 7\" (1.702 m)   Wt 183 lb (83 kg)   SpO2 95%   BMI 28.66 kg/m²       Physical Exam  Vitals reviewed. Constitutional:       Appearance: Normal appearance. HENT:      Head: Normocephalic and atraumatic. Cardiovascular:      Rate and Rhythm: Normal rate and regular rhythm. Pulses: Normal pulses. Heart sounds: Normal heart sounds. Pulmonary:      Effort: Pulmonary effort is normal.      Breath sounds: Normal breath sounds. Skin:         Neurological:      Mental Status: She is alert and oriented to person, place, and time. Psychiatric:         Mood and Affect: Mood normal.         Behavior: Behavior normal.         Thought Content: Thought content normal.         Judgment: Judgment normal.       ASSESSMENT and PLAN  [facility] will see her tomorrow at 8:30AM. Instructed to go to ER if worsening edema, SOB, CP, palpitations, or color change in leg. ICD-10-CM ICD-9-CM    1. Open wound, lower leg, left, subsequent encounter  S81.802D V58.89 REFERRAL TO VASCULAR CENTER     891.0 REFERRAL TO HOME HEALTH      2. Edema, lower extremity  R60.0 782.3 REFERRAL TO VASCULAR CENTER      REFERRAL TO HOME HEALTH      3. Cough  R05.9 786.2 XR CHEST PA LAT      4. Hypotension, unspecified hypotension type  I95.9 458.9       5. Dementia without behavioral disturbance, unspecified [facility] type [facility]  F03.90 294.20 [facility]        Follow-up and Dispositions    Return in about 3 months (around 11/29/2022) for follow up.

## 2022-09-01 ENCOUNTER — TELEPHONE (OUTPATIENT)
Dept: FAMILY MEDICINE CLINIC | Age: 77
End: 2022-09-01

## 2022-09-01 DIAGNOSIS — J18.9 PNEUMONIA OF LEFT LOWER LOBE DUE TO INFECTIOUS ORGANISM: Primary | ICD-10-CM

## 2022-09-01 RX ORDER — DOXYCYCLINE 100 MG/1
100 TABLET ORAL 2 TIMES DAILY
Qty: 20 TABLET | Refills: 0 | Status: SHIPPED | OUTPATIENT
Start: 2022-09-01 | End: 2022-09-11

## 2022-09-01 NOTE — TELEPHONE ENCOUNTER
Patient is in the Hospital because she started to feel worse over night but I did give her the message.

## 2022-09-01 NOTE — TELEPHONE ENCOUNTER
Please advise pt that her xray did show pneumonia and I am sending in an abx. I would like to repeat CXR in 2 weeks to be sure it resolved.

## 2022-09-12 DIAGNOSIS — F03.90 DEMENTIA WITHOUT BEHAVIORAL DISTURBANCE, UNSPECIFIED DEMENTIA TYPE: ICD-10-CM

## 2022-09-15 RX ORDER — VALBENAZINE 80 MG/1
CAPSULE ORAL
Qty: 30 CAPSULE | Refills: 2 | Status: SHIPPED | OUTPATIENT
Start: 2022-09-15

## 2022-09-19 ENCOUNTER — TELEPHONE (OUTPATIENT)
Dept: FAMILY MEDICINE CLINIC | Age: 77
End: 2022-09-19

## 2022-09-26 ENCOUNTER — OFFICE VISIT (OUTPATIENT)
Dept: FAMILY MEDICINE CLINIC | Age: 77
End: 2022-09-26
Payer: MEDICARE

## 2022-09-26 VITALS
RESPIRATION RATE: 17 BRPM | TEMPERATURE: 97 F | SYSTOLIC BLOOD PRESSURE: 131 MMHG | HEART RATE: 83 BPM | OXYGEN SATURATION: 94 % | BODY MASS INDEX: 25.9 KG/M2 | DIASTOLIC BLOOD PRESSURE: 78 MMHG | HEIGHT: 67 IN | WEIGHT: 165 LBS

## 2022-09-26 DIAGNOSIS — J18.9 PNEUMONIA OF LEFT LOWER LOBE DUE TO INFECTIOUS ORGANISM: Primary | ICD-10-CM

## 2022-09-26 DIAGNOSIS — Z09 HOSPITAL DISCHARGE FOLLOW-UP: ICD-10-CM

## 2022-09-26 DIAGNOSIS — J18.9 PNEUMONIA OF LEFT LOWER LOBE DUE TO INFECTIOUS ORGANISM: ICD-10-CM

## 2022-09-26 PROCEDURE — 1090F PRES/ABSN URINE INCON ASSESS: CPT | Performed by: PHYSICIAN ASSISTANT

## 2022-09-26 PROCEDURE — G8752 SYS BP LESS 140: HCPCS | Performed by: PHYSICIAN ASSISTANT

## 2022-09-26 PROCEDURE — 3017F COLORECTAL CA SCREEN DOC REV: CPT | Performed by: PHYSICIAN ASSISTANT

## 2022-09-26 PROCEDURE — 99214 OFFICE O/P EST MOD 30 MIN: CPT | Performed by: PHYSICIAN ASSISTANT

## 2022-09-26 PROCEDURE — G8754 DIAS BP LESS 90: HCPCS | Performed by: PHYSICIAN ASSISTANT

## 2022-09-26 PROCEDURE — 1101F PT FALLS ASSESS-DOCD LE1/YR: CPT | Performed by: PHYSICIAN ASSISTANT

## 2022-09-26 PROCEDURE — 1123F ACP DISCUSS/DSCN MKR DOCD: CPT | Performed by: PHYSICIAN ASSISTANT

## 2022-09-26 PROCEDURE — G8427 DOCREV CUR MEDS BY ELIG CLIN: HCPCS | Performed by: PHYSICIAN ASSISTANT

## 2022-09-26 PROCEDURE — G8399 PT W/DXA RESULTS DOCUMENT: HCPCS | Performed by: PHYSICIAN ASSISTANT

## 2022-09-26 PROCEDURE — G9717 DOC PT DX DEP/BP F/U NT REQ: HCPCS | Performed by: PHYSICIAN ASSISTANT

## 2022-09-26 PROCEDURE — G8536 NO DOC ELDER MAL SCRN: HCPCS | Performed by: PHYSICIAN ASSISTANT

## 2022-09-26 PROCEDURE — G8417 CALC BMI ABV UP PARAM F/U: HCPCS | Performed by: PHYSICIAN ASSISTANT

## 2022-09-26 RX ORDER — METHYLPREDNISOLONE 4 MG/1
TABLET ORAL
Qty: 1 DOSE PACK | Refills: 0 | Status: SHIPPED | OUTPATIENT
Start: 2022-09-26 | End: 2022-10-11 | Stop reason: SDUPTHER

## 2022-09-26 RX ORDER — IPRATROPIUM BROMIDE AND ALBUTEROL SULFATE 2.5; .5 MG/3ML; MG/3ML
SOLUTION RESPIRATORY (INHALATION)
COMMUNITY
Start: 2022-09-19

## 2022-09-26 NOTE — PROGRESS NOTES
Elmer Moon is a 78 y.o.  female presents today for office visit for follow up. Pt would also like to discuss hospital follow up Pt is not fasting. Pt is in Room # 4      1. Have you been to the ER, urgent care clinic since your last visit? Hospitalized since your last visit? No    2. Have you seen or consulted any other health care providers outside of the [address] [place] [address] since your last visit? Include any pap smears or colon screening. No    Upcoming Appts  none    Health Maintenance reviewed     VORB: No orders of the defined types were placed in this encounter.   BRIANA Hope-Cecil Caballero LPN

## 2022-09-26 NOTE — PROGRESS NOTES
HISTORY OF PRESENT ILLNESS  Lori Hoyt is a 78 y.o. female. HPI  Pt is being seen for a f/u from the hospital for LLL pneumonia. Her hospital course was as follows:  Hospital Course     Ms. Dusty Eckert is a 31-year-old joseline lady with past medical history significant for CKD, GERD, tardive dyskinesia, irritable bowel syndrome with diarrhea who is presenting with worsening shortness of breath and cough. On arrival to the ER patient was found to be hypoxic at 89% SPO2 on room air. Her oxygen status improved on oxygen support at 2 L nasal cannula. Patient was febrile with temperature of 100.2, but blood pressure and pulse was normal. She was slightly tachypneic. Chest x-ray showed retrocardiac mid to lower thoracic consolidative opacity representing pneumonia and atelectasis with suspected effusion. D-dimer was negative for age-adjusted. Lactic acid normal. Patient was admitted for hypoxic respiratory failure due to pneumonia and sepsis management    Active Hospital Problems   Diagnosis   No Sepsis, meets SIRS criteria only [A41.9]   Open wound of hand except fingers [S61.409A]   Irritable bowel syndrome with diarrhea [K58.0]   Dementia ([facility] [address] [F03.90]   Other specified hypothyroidism [E03.8]   CKD (chronic kidney disease) stage 3, GFR 30-59 ml/min ([facility]) [N18.30]   Tardive dyskinesia [G24.01]   GERD (gastroesophageal reflux disease) [K21.9]   Pneumonia of left lower lobe due to infectious organism [J18.9]     Resolved Hospital Problems   No resolved problems to display. Pneumonia of left lower lobe due to infectious organism  Patient was placed on oxygen support. She was started on antibiotic of Rocephin and doxycycline to cover for community-acquired pneumonia. And has already finished antibiotic course for the pneumonia. Significant improvement in her breathing status. Patient initially also on 4 L of nasal cannula, it was weaned to 2 L yesterday, she is completely weaned off oxygen, needing only as needed especially on ambulation. COPD  Patient was given breathing treatment with Pulmicort and DuoNeb. Added montelukast. Patient was given chest physical therapy with incentive spirometer and Acapella. Patient responded pretty well with significant symptomatic improvement in her breathing status. Dementia  Patient at baseline throughout the inpatient stay    Tardive dyskinesia  -Continued with home medication    CKD  -Monitored renal function. Avoided any nephrotoxic drugs    Hypothyroidism  -Continued home dose of levothyroxine    Mechanical fall, no head injury or loss of consciousness  -Place patient on fall precaution. PT/OT eval recommended rehab/SNF for active physical and Occupational Therapy    Wound on left hand due to laceration  -Patient was provided wound care and daily dressing    Peripheral vascular disease and wound on left lower extremity  Patient was continued on the Unna boot and crepe bandage. Vascular surgery consult to follow-up the patient as an outpatient    During this admission, a clinical scoring tool indicated that this patient may benefit from Advanced Illness Management Services (AIM- including Advance Care Planning, Palliative Care Medicine, or Hospice Services). Discharge Status     Physical Exam    BP: 134/62  Heart Rate: 71 (22 0704)  Pulse: 71 (22 1542)  Temp: 98.5 °F (36.9 °C)  Resp: 18  Height: 67\" (170.2 cm)  Weight: 86 kg (189 lb 9.5 oz)  BMI (Calculated): 29.69  SpO2: 97 %  Flow (L/min): 2  Temp (24hrs), Av °F (36.7 °C), Min:97.6 °F (36.4 °C), Max:98.5 °F (36.9 °C)  Physical Exam  Constitutional:   Appearance: She is normal weight. She is Normal appearing. HENT:   Head: Normocephalic and atraumatic. Mouth/Throat:   Mouth: Mucous membranes are moist.   Eyes:   Extraocular Movements: Extraocular movements intact. Conjunctiva/sclera: Conjunctivae normal.   Pupils: Pupils are equal, round, and reactive to light.    Cardiovascular:   Rate and Rhythm: Normal rate and regular rhythm. Pulses: Normal pulses. Heart sounds: Normal heart sounds. Pulmonary:   Effort: No respiratory distress   Breath sounds: No stridor. Minimal rhonchi present. No wheezing or rales. Comments: B/L reduced breath sounds left> right at bases, improved since admission  Chest:   Chest wall: Tenderness present. Abdominal:   General: Abdomen is flat. Bowel sounds are normal. There is no distension. Palpations: Abdomen is soft. Tenderness: There is no abdominal tenderness. Musculoskeletal:   Cervical back: Normal range of motion and neck supple. Neurological:   General: No focal deficit present. Mental Status: She is alert and oriented to person, place, and time. Mental status is at baseline. Psychiatric:   Mood and Affect: Mood normal.   Behavior: Behavior normal.     Patient status at discharge: improved and stable  Disposition of patient at discharge: skilled nursing facility      Discharge Labs   All labs in past 24hrs: No results found for this visit on 09/01/22 (from the past 24 hour(s)). Patient Instructions     Discharge Procedure Orders   Discharge . .. Regular Diet   Discharge: Regular Diet     Discharge: Physical Therapy   Discharge: Physical Therapy     Discharge: Occupational Therapy   Discharge: Occupational Therapy     Discharge: Oxygen per nasal cannula     Liters per minute 2 LPM     BASIC METABOLIC PANEL   Test includes BUN, CO2, Chloride, Creatinine, Glucose, Potassium, Calcium and Sodium.      Follow Up Info (next 45 days)     Follow up with Charli Hernandez   Specialty: Family Practice   Phone: [phone]   Where: [facility]         Contact information for after-discharge care           [address] [place] [address]   Phone: [phone]   Fax: [phone]   Where: [address]   Service: [address] [facility])   Phone: [phone]   Fax: [phone]   Where: [address] [id number]   Service: [facility]          She is feeling better but missed her budesonide for about a week and started feeling SOB and increased cough again however her energy and mental status have improved. She did well on the medrol so will try another course of that as well as get a CXR. Allergies   Allergen Reactions    Erythromycin Rash    Gabapentin Other (comments)     Psych/hallucinations    Ibuprofen Other (comments)     Patient states unable to take d/t kidneys      Prednisone Hives     States can't take just prednisone but mixes are ok       Current Outpatient Medications   Medication Sig    albuterol-ipratropium (DUO-NEB) 2.5 mg-0.5 mg/3 ml nebu USE 1 AMPULE IN NEBULIZER EVERY 6 HOURS FOR CPOD    methylPREDNISolone (MEDROL DOSEPACK) 4 mg tablet Take as directed    Ingrezza 80 mg cap TAKE ONE CAPSULE BY MOUTH DAILY    silver sulfADIAZINE (SILVADENE) 1 % topical cream Apply  to affected area daily. nystatin (MYCOSTATIN) powder Apply  to affected area two (2) times a day. Apply  to affected area two (2) times a day. rosuvastatin (CRESTOR) 40 mg tablet TAKE 1 TABLET EVERY NIGHT    levothyroxine (SYNTHROID) 50 mcg tablet Take 1 Tablet by mouth Daily (before breakfast). Omeprazole delayed release (PRILOSEC D/R) 20 mg tablet Take 1 Tablet by mouth daily. albuterol (PROVENTIL HFA, VENTOLIN HFA, PROAIR HFA) 90 mcg/actuation inhaler Take 2 Puffs by inhalation every four (4) hours as needed for Wheezing. furosemide (LASIX) 40 mg tablet Take  by mouth daily. meclizine (ANTIVERT) 25 mg tablet Take  by mouth three (3) times daily as needed for Dizziness. ibandronate (Boniva) 150 mg tablet Take 150 mg by mouth every thirty (30) days. vitamin E (AQUA GEMS) 400 unit capsule Take 400 Units by mouth two (2) times a day.     omega 3-dha-epa-fish oil 60- mg cap Take 400 mg by mouth. Latuda 80 mg tab tablet Take 1 Tablet by mouth daily. doxepin (SINEquan) 50 mg capsule Take  by mouth nightly. montelukast (SINGULAIR) 10 mg tablet Take 1 Tablet by mouth daily. rOPINIRole (REQUIP) 0.5 mg tablet TAKE 1 TABLET THREE TIMES DAILY    ergocalciferol (ERGOCALCIFEROL) 1,250 mcg (50,000 unit) capsule Take 50,000 Units by mouth every seven (7) days. budesonide (ENTOCORT EC) 3 mg capsule Take 9 mg by mouth daily. cyanocobalamin (VITAMIN B12) 1,000 mcg/mL injection 1 mL by IntraMUSCular route every fourteen (14) days. ferrous sulfate 325 mg (65 mg iron) cpER Take  by mouth.    magnesium oxide (MAG-OX) 400 mg tablet Take 400 mg by mouth.    lamoTRIgine (LaMICtal) 200 mg tablet Take 200 mg by mouth two (2) times a day. No current facility-administered medications for this visit. Review of Systems   Constitutional:  Positive for malaise/fatigue (chronic) and weight loss (17lbs since last mo - was in hosp w pneumonia so will monitor). Negative for chills and fever. HENT:  Negative for congestion, ear pain, sinus pain, sore throat and tinnitus. Eyes: Negative. Respiratory:  Positive for cough (improved) and shortness of breath (chronic but not worse than usu). Negative for hemoptysis, sputum production and wheezing. Cardiovascular:  Positive for leg swelling (chronic - no worse). Negative for chest pain and palpitations. Gastrointestinal:  Positive for heartburn (controlled on meds). Negative for abdominal pain, blood in stool, constipation, diarrhea, melena, nausea and vomiting. Genitourinary:  Negative for dysuria, flank pain, frequency, hematuria and urgency. Skin:  Negative for itching and rash. Neurological:  Negative for dizziness and headaches. Psychiatric/Behavioral:  Positive for memory loss (pt has dementia). Negative for depression. The patient is not nervous/anxious.     Visit Vitals  /78 (BP 1 Location: Left upper arm, BP Patient Position: Sitting)   Pulse 83   Temp 97 °F (36.1 °C) (Temporal)   Resp 17   Ht 5' 7\" (1.702 m)   Wt 165 lb (74.8 kg)   SpO2 94%   BMI 25.84 kg/m²       Physical Exam  Constitutional:       Appearance: Normal appearance. HENT:      Head: Normocephalic and atraumatic. Cardiovascular:      Rate and Rhythm: Normal rate and regular rhythm. Pulses: Normal pulses. Heart sounds: Normal heart sounds. Pulmonary:      Effort: Pulmonary effort is normal.      Breath sounds: Normal breath sounds. Neurological:      Mental Status: She is alert and oriented to person, place, and time. Psychiatric:         Mood and Affect: Mood normal.         Behavior: Behavior normal.         Thought Content: Thought content normal.         Judgment: Judgment normal.       ASSESSMENT and PLAN    ICD-10-CM ICD-9-CM    1. Pneumonia of left lower lobe due to infectious organism  J18.9 486 XR CHEST PA LAT      methylPREDNISolone (MEDROL DOSEPACK) 4 mg tablet      2. Hospital discharge follow-up  Z09 V67.59         Follow-up and Dispositions    Return in about 3 months (around 12/26/2022) for follow up. Pt expressed understanding of visit summary and plans for any follow ups or referrals as well as any medications prescribed.

## 2022-10-11 ENCOUNTER — HOSPITAL ENCOUNTER (OUTPATIENT)
Dept: LAB | Age: 77
Discharge: HOME | End: 2022-10-11
Payer: MEDICARE

## 2022-10-11 ENCOUNTER — OFFICE VISIT (OUTPATIENT)
Dept: FAMILY MEDICINE CLINIC | Age: 77
End: 2022-10-11
Payer: MEDICARE

## 2022-10-11 VITALS
OXYGEN SATURATION: 95 % | TEMPERATURE: 98.1 F | WEIGHT: 177 LBS | HEIGHT: 67 IN | BODY MASS INDEX: 27.78 KG/M2 | SYSTOLIC BLOOD PRESSURE: 128 MMHG | HEART RATE: 72 BPM | RESPIRATION RATE: 16 BRPM | DIASTOLIC BLOOD PRESSURE: 74 MMHG

## 2022-10-11 DIAGNOSIS — Z87.440 HISTORY OF RECURRENT UTIS: Primary | ICD-10-CM

## 2022-10-11 DIAGNOSIS — N30.00 ACUTE CYSTITIS WITHOUT HEMATURIA: ICD-10-CM

## 2022-10-11 DIAGNOSIS — K21.9 GASTROESOPHAGEAL REFLUX DISEASE WITHOUT ESOPHAGITIS: ICD-10-CM

## 2022-10-11 DIAGNOSIS — J18.9 PNEUMONIA OF LEFT LOWER LOBE DUE TO INFECTIOUS ORGANISM: ICD-10-CM

## 2022-10-11 DIAGNOSIS — M81.0 AGE-RELATED OSTEOPOROSIS WITHOUT CURRENT PATHOLOGICAL FRACTURE: ICD-10-CM

## 2022-10-11 DIAGNOSIS — Z87.440 HISTORY OF RECURRENT UTIS: ICD-10-CM

## 2022-10-11 DIAGNOSIS — E78.00 HYPERCHOLESTEROLEMIA: ICD-10-CM

## 2022-10-11 LAB
BILIRUB UR QL STRIP: NEGATIVE
GLUCOSE UR-MCNC: NEGATIVE MG/DL
KETONES P FAST UR STRIP-MCNC: NEGATIVE MG/DL
PH UR STRIP: 8.5 [PH] (ref 4.6–8)
PROT UR QL STRIP: ABNORMAL
SP GR UR STRIP: 1.02 (ref 1–1.03)
UA UROBILINOGEN AMB POC: ABNORMAL (ref 0.2–1)
URINALYSIS CLARITY POC: CLEAR
URINALYSIS COLOR POC: YELLOW
URINE BLOOD POC: ABNORMAL
URINE LEUKOCYTES POC: NEGATIVE
URINE NITRITES POC: NEGATIVE

## 2022-10-11 PROCEDURE — 99214 OFFICE O/P EST MOD 30 MIN: CPT | Performed by: PHYSICIAN ASSISTANT

## 2022-10-11 PROCEDURE — G8427 DOCREV CUR MEDS BY ELIG CLIN: HCPCS | Performed by: PHYSICIAN ASSISTANT

## 2022-10-11 PROCEDURE — 3078F DIAST BP <80 MM HG: CPT | Performed by: PHYSICIAN ASSISTANT

## 2022-10-11 PROCEDURE — G8536 NO DOC ELDER MAL SCRN: HCPCS | Performed by: PHYSICIAN ASSISTANT

## 2022-10-11 PROCEDURE — G8752 SYS BP LESS 140: HCPCS | Performed by: PHYSICIAN ASSISTANT

## 2022-10-11 PROCEDURE — G8754 DIAS BP LESS 90: HCPCS | Performed by: PHYSICIAN ASSISTANT

## 2022-10-11 PROCEDURE — 3074F SYST BP LT 130 MM HG: CPT | Performed by: PHYSICIAN ASSISTANT

## 2022-10-11 PROCEDURE — 1123F ACP DISCUSS/DSCN MKR DOCD: CPT | Performed by: PHYSICIAN ASSISTANT

## 2022-10-11 PROCEDURE — 87086 URINE CULTURE/COLONY COUNT: CPT

## 2022-10-11 PROCEDURE — 81003 URINALYSIS AUTO W/O SCOPE: CPT | Performed by: PHYSICIAN ASSISTANT

## 2022-10-11 PROCEDURE — 1090F PRES/ABSN URINE INCON ASSESS: CPT | Performed by: PHYSICIAN ASSISTANT

## 2022-10-11 PROCEDURE — G8417 CALC BMI ABV UP PARAM F/U: HCPCS | Performed by: PHYSICIAN ASSISTANT

## 2022-10-11 PROCEDURE — 1101F PT FALLS ASSESS-DOCD LE1/YR: CPT | Performed by: PHYSICIAN ASSISTANT

## 2022-10-11 PROCEDURE — G9717 DOC PT DX DEP/BP F/U NT REQ: HCPCS | Performed by: PHYSICIAN ASSISTANT

## 2022-10-11 PROCEDURE — 3017F COLORECTAL CA SCREEN DOC REV: CPT | Performed by: PHYSICIAN ASSISTANT

## 2022-10-11 RX ORDER — PHENOL/SODIUM PHENOLATE
20 AEROSOL, SPRAY (ML) MUCOUS MEMBRANE DAILY
Qty: 90 TABLET | Refills: 1 | Status: SHIPPED | OUTPATIENT
Start: 2022-10-11

## 2022-10-11 RX ORDER — NYSTATIN 100000 [USP'U]/G
POWDER TOPICAL 2 TIMES DAILY
Qty: 60 G | Refills: 1 | Status: SHIPPED | OUTPATIENT
Start: 2022-10-11

## 2022-10-11 RX ORDER — IBANDRONATE SODIUM 150 MG/1
150 TABLET, FILM COATED ORAL
Qty: 3 TABLET | Refills: 3 | Status: SHIPPED | OUTPATIENT
Start: 2022-10-11

## 2022-10-11 RX ORDER — ROSUVASTATIN CALCIUM 40 MG/1
40 TABLET, COATED ORAL
Qty: 90 TABLET | Refills: 1 | Status: SHIPPED | OUTPATIENT
Start: 2022-10-11

## 2022-10-11 RX ORDER — METHYLPREDNISOLONE 4 MG/1
TABLET ORAL
Qty: 1 DOSE PACK | Refills: 0 | Status: SHIPPED | OUTPATIENT
Start: 2022-10-11

## 2022-10-11 NOTE — PROGRESS NOTES
Justin Vincent is a 78 y.o.  female presents today for office visit for follow up. Pt would also like to discuss medication refill. Pt is not fasting. Pt is in Room # 4      1. Have you been to the ER, urgent care clinic since your last visit? Hospitalized since your last visit? No    2. Have you seen or consulted any other health care providers outside of the [address] [place] [address] since your last visit? Include any pap smears or colon screening. No    Upcoming Appts  none    Health Maintenance reviewed     VORB: No orders of the defined types were placed in this encounter.   BRIANA Moscoso-Cecil Sofia LPN

## 2022-10-12 ENCOUNTER — TELEPHONE (OUTPATIENT)
Dept: FAMILY MEDICINE CLINIC | Age: 77
End: 2022-10-12

## 2022-10-12 NOTE — TELEPHONE ENCOUNTER
Patients [facility] called to inform office that she slipped off the shower seat and hit her head on the commode. LPN was informed that her BP was 174/76 and pain was 8/10. LPn was also informed that patient has had a brain bleed in the past. LPN then advised the patient to go to the ER or urgent care just to make sure everything is ok.

## 2022-10-13 LAB
BACTERIA SPEC CULT: NORMAL
SERVICE CMNT-IMP: NORMAL

## 2022-10-19 ENCOUNTER — TELEPHONE (OUTPATIENT)
Dept: FAMILY MEDICINE CLINIC | Age: 77
End: 2022-10-19

## 2022-10-25 LAB
BILIRUB UR QL STRIP: NORMAL
GLUCOSE UR-MCNC: NEGATIVE MG/DL
KETONES P FAST UR STRIP-MCNC: NEGATIVE MG/DL
PH UR STRIP: 8 [PH] (ref 4.6–8)
PROT UR QL STRIP: NORMAL
SP GR UR STRIP: 1.01 (ref 1–1.03)
UA UROBILINOGEN AMB POC: NORMAL (ref 0.2–1)
URINALYSIS CLARITY POC: CLEAR
URINALYSIS COLOR POC: NORMAL
URINE BLOOD POC: NORMAL
URINE LEUKOCYTES POC: NORMAL
URINE NITRITES POC: POSITIVE

## 2022-10-27 NOTE — PROGRESS NOTES
HISTORY OF PRESENT ILLNESS  Linda Austin is a 78 y.o. female. HPI  Pt is being seen for a possible UTI. She has had frequency the past few days and has a hx of recurrent UTIs. Denies hematuria, dysuria, abd/pelvic/flank pain, fever, chills, and mental status changes. She also needs refills on her crestor, omeprazole, and boniva. Her CXR showed that her pneumonia was clearing but she still feels a little SOB. Will try a medrol pack as that helped a lot previously but if not improved with that will follow up. Allergies   Allergen Reactions    Erythromycin Rash    Gabapentin Other (comments)     Psych/hallucinations    Ibuprofen Other (comments)     Patient states unable to take d/t kidneys      Prednisone Hives     States can't take just prednisone but mixes are ok       Current Outpatient Medications   Medication Sig    nystatin (MYCOSTATIN) powder Apply  to affected area two (2) times a day. Apply  to affected area two (2) times a day. rosuvastatin (CRESTOR) 40 mg tablet Take 1 Tablet by mouth nightly. Omeprazole delayed release (PRILOSEC D/R) 20 mg tablet Take 1 Tablet by mouth daily. ibandronate (Boniva) 150 mg tablet Take 150 mg by mouth every thirty (30) days. methylPREDNISolone (MEDROL DOSEPACK) 4 mg tablet Take as directed    melatonin 5 mg tablet Take 10 mg by mouth nightly. cyanocobalamin, vitamin B-12, (VITAMIN B-12 INJECTION) 1,200 mEq by Injection route. Twice a month-1st & 15th    albuterol-ipratropium (DUO-NEB) 2.5 mg-0.5 mg/3 ml nebu USE 1 AMPULE IN NEBULIZER EVERY 6 HOURS FOR CPOD    Ingrezza 80 mg cap TAKE ONE CAPSULE BY MOUTH DAILY    silver sulfADIAZINE (SILVADENE) 1 % topical cream Apply  to affected area daily. levothyroxine (SYNTHROID) 50 mcg tablet Take 1 Tablet by mouth Daily (before breakfast). albuterol (PROVENTIL HFA, VENTOLIN HFA, PROAIR HFA) 90 mcg/actuation inhaler Take 2 Puffs by inhalation every four (4) hours as needed for Wheezing.     furosemide (LASIX) 40 mg tablet Take  by mouth daily. meclizine (ANTIVERT) 25 mg tablet Take  by mouth three (3) times daily as needed for Dizziness. vitamin E (AQUA GEMS) 400 unit capsule Take 400 Units by mouth two (2) times a day. omega 3-dha-epa-fish oil 60- mg cap Take 400 mg by mouth. Latuda 80 mg tab tablet Take 1 Tablet by mouth daily. doxepin (SINEquan) 50 mg capsule Take  by mouth nightly. montelukast (SINGULAIR) 10 mg tablet Take 1 Tablet by mouth daily. rOPINIRole (REQUIP) 0.5 mg tablet TAKE 1 TABLET THREE TIMES DAILY (Patient taking differently: 1 mg four (4) times daily.)    ergocalciferol (ERGOCALCIFEROL) 1,250 mcg (50,000 unit) capsule Take 50,000 Units by mouth every seven (7) days. budesonide (ENTOCORT EC) 3 mg capsule Take 9 mg by mouth daily. cyanocobalamin (VITAMIN B12) 1,000 mcg/mL injection 1 mL by IntraMUSCular route every fourteen (14) days. ferrous sulfate 325 mg (65 mg iron) cpER Take  by mouth.    magnesium oxide (MAG-OX) 400 mg tablet Take 250 mg by mouth three (3) times daily. lamoTRIgine (LaMICtal) 200 mg tablet Take 200 mg by mouth two (2) times a day. No current facility-administered medications for this visit. Review of Systems   Constitutional:  Positive for malaise/fatigue (chronic) and weight loss (17lbs since last mo - was in hosp w pneumonia so will monitor). Negative for chills and fever. HENT:  Negative for congestion, ear pain, sinus pain, sore throat and tinnitus. Eyes: Negative. Respiratory:  Positive for cough and shortness of breath (chronic). Negative for hemoptysis, sputum production and wheezing. Cardiovascular:  Positive for leg swelling (chronic - no worse). Negative for chest pain and palpitations. Gastrointestinal:  Positive for heartburn (controlled on meds). Negative for abdominal pain, blood in stool, constipation, diarrhea, melena, nausea and vomiting.    Genitourinary:  Negative for dysuria, flank pain, frequency, hematuria and urgency. Skin:  Negative for itching and rash. Neurological:  Negative for dizziness and headaches. Psychiatric/Behavioral:  Positive for memory loss (pt has dementia - at baseline). Negative for depression. The patient is not nervous/anxious. Visit Vitals  /74 (BP 1 Location: Left upper arm, BP Patient Position: Sitting)   Pulse 72   Temp 98.1 °F (36.7 °C) (Temporal)   Resp 16   Ht 5' 7\" (1.702 m)   Wt 177 lb (80.3 kg)   SpO2 95%   BMI 27.72 kg/m²       Physical Exam  Constitutional:       Appearance: Normal appearance. HENT:      Head: Normocephalic and atraumatic. Cardiovascular:      Rate and Rhythm: Normal rate and regular rhythm. Pulses: Normal pulses. Heart sounds: Normal heart sounds. Pulmonary:      Effort: Pulmonary effort is normal.      Breath sounds: Normal breath sounds. Neurological:      Mental Status: She is alert and oriented to person, place, and time. Psychiatric:         Mood and Affect: Mood normal.         Behavior: Behavior normal.         Thought Content: Thought content normal.         Judgment: Judgment normal.       ASSESSMENT and PLAN    ICD-10-CM ICD-9-CM    1. History of recurrent UTIs  Z87.440 V13.02 AMB POC URINALYSIS DIP STICK AUTO W/O MICRO      CULTURE, URINE      2. Hypercholesterolemia  E78.00 272.0 rosuvastatin (CRESTOR) 40 mg tablet      3. Gastroesophageal reflux disease without esophagitis  K21.9 530.81 Omeprazole delayed release (PRILOSEC D/R) 20 mg tablet      4. Age-related osteoporosis without current pathological fracture  M81.0 733.01 ibandronate (Boniva) 150 mg tablet      5. Pneumonia of left lower lobe due to infectious organism  J18.9 486 methylPREDNISolone (MEDROL DOSEPACK) 4 mg tablet      6. Acute cystitis without hematuria  N30.00 595.0 CULTURE, URINE        Follow-up and Dispositions    Return in about 3 months (around 1/11/2023) for follow up.      Pt expressed understanding of visit summary and plans for any follow ups or referrals as well as any medications prescribed.